# Patient Record
Sex: MALE | Race: WHITE | NOT HISPANIC OR LATINO | Employment: FULL TIME | URBAN - METROPOLITAN AREA
[De-identification: names, ages, dates, MRNs, and addresses within clinical notes are randomized per-mention and may not be internally consistent; named-entity substitution may affect disease eponyms.]

---

## 2017-01-24 ENCOUNTER — TELEPHONE (OUTPATIENT)
Dept: INTERNAL MEDICINE | Facility: CLINIC | Age: 47
End: 2017-01-24

## 2017-01-24 DIAGNOSIS — I10 ESSENTIAL HYPERTENSION, BENIGN: Primary | ICD-10-CM

## 2017-01-24 DIAGNOSIS — E78.00 PURE HYPERCHOLESTEROLEMIA: ICD-10-CM

## 2017-01-24 NOTE — TELEPHONE ENCOUNTER
----- Message from Betsy Camarena sent at 1/24/2017 12:43 PM EST -----  Contact: pt  Mr John is a pt of Dr Benjamin and is coming in on 1/26/17 for labs. Could you please have Dr Sargent put lab orders in for this pt. Since he is covering for quinn today. thanks

## 2017-01-26 ENCOUNTER — LAB (OUTPATIENT)
Dept: INTERNAL MEDICINE | Facility: CLINIC | Age: 47
End: 2017-01-26

## 2017-01-26 DIAGNOSIS — E78.00 PURE HYPERCHOLESTEROLEMIA: ICD-10-CM

## 2017-01-26 DIAGNOSIS — I10 ESSENTIAL HYPERTENSION: Primary | ICD-10-CM

## 2017-03-08 ENCOUNTER — HOSPITAL ENCOUNTER (OUTPATIENT)
Dept: PET IMAGING | Facility: HOSPITAL | Age: 47
Discharge: HOME | End: 2017-03-08
Attending: INTERNAL MEDICINE | Admitting: INTERNAL MEDICINE

## 2017-03-08 ENCOUNTER — LAB (OUTPATIENT)
Dept: LAB | Facility: HOSPITAL | Age: 47
End: 2017-03-08

## 2017-03-08 DIAGNOSIS — D3A.012 CARCINOID TUMOR OF ILEUM: ICD-10-CM

## 2017-03-08 LAB
ALBUMIN SERPL-MCNC: 4.7 G/DL (ref 3.5–5.2)
ALBUMIN/GLOB SERPL: 1.8 G/DL (ref 1.1–2.4)
ALP SERPL-CCNC: 65 U/L (ref 38–116)
ALT SERPL W P-5'-P-CCNC: 37 U/L (ref 0–41)
ANION GAP SERPL CALCULATED.3IONS-SCNC: 14.4 MMOL/L
AST SERPL-CCNC: 25 U/L (ref 0–40)
BASOPHILS # BLD AUTO: 0.03 10*3/MM3 (ref 0–0.1)
BASOPHILS NFR BLD AUTO: 0.6 % (ref 0–1.1)
BILIRUB SERPL-MCNC: 0.8 MG/DL (ref 0.1–1.2)
BUN BLD-MCNC: 20 MG/DL (ref 6–20)
BUN/CREAT SERPL: 24.7 (ref 7.3–30)
CALCIUM SPEC-SCNC: 9.5 MG/DL (ref 8.5–10.2)
CHLORIDE SERPL-SCNC: 99 MMOL/L (ref 98–107)
CO2 SERPL-SCNC: 25.6 MMOL/L (ref 22–29)
CREAT BLD-MCNC: 0.81 MG/DL (ref 0.7–1.3)
CREAT BLDA-MCNC: 0.8 MG/DL (ref 0.6–1.3)
DEPRECATED RDW RBC AUTO: 42.6 FL (ref 37–49)
EOSINOPHIL # BLD AUTO: 0.07 10*3/MM3 (ref 0–0.36)
EOSINOPHIL NFR BLD AUTO: 1.3 % (ref 1–5)
ERYTHROCYTE [DISTWIDTH] IN BLOOD BY AUTOMATED COUNT: 13.1 % (ref 11.7–14.5)
GFR SERPL CREATININE-BSD FRML MDRD: 102 ML/MIN/1.73
GLOBULIN UR ELPH-MCNC: 2.6 GM/DL (ref 1.8–3.5)
GLUCOSE BLD-MCNC: 80 MG/DL (ref 74–124)
HCT VFR BLD AUTO: 46.1 % (ref 40–49)
HGB BLD-MCNC: 15.5 G/DL (ref 13.5–16.5)
IMM GRANULOCYTES # BLD: 0 10*3/MM3 (ref 0–0.03)
IMM GRANULOCYTES NFR BLD: 0 % (ref 0–0.5)
LYMPHOCYTES # BLD AUTO: 2.1 10*3/MM3 (ref 1–3.5)
LYMPHOCYTES NFR BLD AUTO: 39.3 % (ref 20–49)
MCH RBC QN AUTO: 30.1 PG (ref 27–33)
MCHC RBC AUTO-ENTMCNC: 33.6 G/DL (ref 32–35)
MCV RBC AUTO: 89.5 FL (ref 83–97)
MONOCYTES # BLD AUTO: 0.4 10*3/MM3 (ref 0.25–0.8)
MONOCYTES NFR BLD AUTO: 7.5 % (ref 4–12)
NEUTROPHILS # BLD AUTO: 2.74 10*3/MM3 (ref 1.5–7)
NEUTROPHILS NFR BLD AUTO: 51.3 % (ref 39–75)
NRBC BLD MANUAL-RTO: 0 /100 WBC (ref 0–0)
PLATELET # BLD AUTO: 232 10*3/MM3 (ref 150–375)
PMV BLD AUTO: 9.8 FL (ref 8.9–12.1)
POTASSIUM BLD-SCNC: 4 MMOL/L (ref 3.5–4.7)
PROT SERPL-MCNC: 7.3 G/DL (ref 6.3–8)
RBC # BLD AUTO: 5.15 10*6/MM3 (ref 4.3–5.5)
SODIUM BLD-SCNC: 139 MMOL/L (ref 134–145)
VIT B12 BLD-MCNC: 363 PG/ML (ref 250–999)
WBC NRBC COR # BLD: 5.34 10*3/MM3 (ref 4–10)

## 2017-03-08 PROCEDURE — 85025 COMPLETE CBC W/AUTO DIFF WBC: CPT

## 2017-03-08 PROCEDURE — 0 IOPAMIDOL 61 % SOLUTION: Performed by: INTERNAL MEDICINE

## 2017-03-08 PROCEDURE — 82565 ASSAY OF CREATININE: CPT

## 2017-03-08 PROCEDURE — 74177 CT ABD & PELVIS W/CONTRAST: CPT

## 2017-03-08 PROCEDURE — 82607 VITAMIN B-12: CPT

## 2017-03-08 PROCEDURE — 71260 CT THORAX DX C+: CPT

## 2017-03-08 PROCEDURE — 25510000001 DIATRIZOATE MEGLUMINE & SODIUM PER 1 ML: Performed by: INTERNAL MEDICINE

## 2017-03-08 PROCEDURE — 80053 COMPREHEN METABOLIC PANEL: CPT

## 2017-03-08 PROCEDURE — 36415 COLL VENOUS BLD VENIPUNCTURE: CPT

## 2017-03-08 RX ADMIN — IOPAMIDOL 85 ML: 612 INJECTION, SOLUTION INTRAVENOUS at 08:45

## 2017-03-08 RX ADMIN — DIATRIZOATE MEGLUMINE AND DIATRIZOATE SODIUM 30 ML: 660; 100 LIQUID ORAL; RECTAL at 08:00

## 2017-03-10 LAB — CGA SERPL-SCNC: <1 NMOL/L (ref 0–5)

## 2017-03-13 LAB
5OH-INDOLEACETATE 24H UR-MCNC: NORMAL MG/L
5OH-INDOLEACETATE 24H UR-MRATE: NORMAL MG/(24.H)

## 2017-03-21 ENCOUNTER — APPOINTMENT (OUTPATIENT)
Dept: LAB | Facility: HOSPITAL | Age: 47
End: 2017-03-21

## 2017-03-21 ENCOUNTER — INFUSION (OUTPATIENT)
Dept: ONCOLOGY | Facility: HOSPITAL | Age: 47
End: 2017-03-21

## 2017-03-21 ENCOUNTER — OFFICE VISIT (OUTPATIENT)
Dept: ONCOLOGY | Facility: CLINIC | Age: 47
End: 2017-03-21

## 2017-03-21 VITALS
BODY MASS INDEX: 30.75 KG/M2 | DIASTOLIC BLOOD PRESSURE: 86 MMHG | SYSTOLIC BLOOD PRESSURE: 134 MMHG | RESPIRATION RATE: 12 BRPM | HEART RATE: 55 BPM | HEIGHT: 70 IN | TEMPERATURE: 98.1 F | OXYGEN SATURATION: 100 % | WEIGHT: 214.8 LBS

## 2017-03-21 DIAGNOSIS — K90.9 VITAMIN B12 DEFICIENCY DUE TO INTESTINAL MALABSORPTION: Primary | ICD-10-CM

## 2017-03-21 DIAGNOSIS — D3A.012 CARCINOID TUMOR OF ILEUM: ICD-10-CM

## 2017-03-21 DIAGNOSIS — E53.8 B12 DEFICIENCY: Primary | ICD-10-CM

## 2017-03-21 DIAGNOSIS — E53.8 VITAMIN B12 DEFICIENCY DUE TO INTESTINAL MALABSORPTION: Primary | ICD-10-CM

## 2017-03-21 PROCEDURE — 99213 OFFICE O/P EST LOW 20 MIN: CPT | Performed by: INTERNAL MEDICINE

## 2017-03-21 RX ORDER — CYANOCOBALAMIN 1000 UG/ML
1000 INJECTION, SOLUTION INTRAMUSCULAR; SUBCUTANEOUS ONCE
Status: COMPLETED | OUTPATIENT
Start: 2017-03-21 | End: 2017-03-21

## 2017-03-21 RX ORDER — CYANOCOBALAMIN 1000 UG/ML
1000 INJECTION, SOLUTION INTRAMUSCULAR; SUBCUTANEOUS ONCE
Status: CANCELLED | OUTPATIENT
Start: 2017-04-18

## 2017-03-21 RX ADMIN — CYANOCOBALAMIN 1000 MCG: 1000 INJECTION, SOLUTION INTRAMUSCULAR; SUBCUTANEOUS at 10:46

## 2017-03-21 NOTE — PROGRESS NOTES
Subjective     REASON FOR FOLLOW UP:  RESECTED CARCINOID TUMOR of ILEUM    HISTORY OF PRESENT ILLNESS:  The patient is a 47 y.o. year old male who was having some abdominal complaints which led to a colonoscopy with Dr. Vo on 8/16/2016.  He was found to have a polyp in the terminal ileum which was biopsied and showed carcinoid tumor.  He subsequently underwent a staging CT scan of the abdomen and pelvis on 8/26/2016 which showed no evidence of distant metastases but did show a 1.7 cm regional lymph node.    The patient underwent a laparoscopic right hemicolectomy with resection of the terminal ileum and appendectomy performed by Dr Bedolla on 8/29/2016.  The pathology from that specimen as noted below showed an 8 mm carcinoid tumor in the terminal ileum with 1 out of 16 lymph nodes positive for metastatic carcinoid tumor (T3 N1 M0).    She was seen for initial consultation in our office in September 2016.  It was determined that he did not require any adjuvant treatment but we set up a surveillance schedule every 6 months or so over the next 2 years to continue to monitor for recurrence.      He returns today for his first 6 month follow-up visit with CT scans and labs.  He sees beginning along well.  The CT scan from 3/8/2017 showed no evidence of recurrent or metastatic disease.  His serum chromogranin A level was less than 1.  His B12 level was in the low normal range at 363.    History of Present Illness     Past Medical History   Diagnosis Date   • Anxiety    • Cancer      Terminal ileum   • Colon polyp    • Dizziness    • Heart palpitations      Patient takes Atenolol   • History of kidney stones    • Hypercholesterolemia    • Nephrolithiasis         Past Surgical History   Procedure Laterality Date   • Colonoscopy  05/21/2009     , ruperto    • Colonoscopy N/A 8/16/2016     Procedure: COLONOSCOPYWITH BIOPSIES AND COLD POLYPECTOMY TIMES 1 ;  Surgeon: Will Vo MD;  Location: [facility] ENDOSCOPY;   Service:    • Tonsillectomy       CHILDHOOD   • Colon resection Right 8/29/2016     Procedure: COLON RESECTION RIGHT LAPAROSCOPIC;  Surgeon: Baron Bedolla MD;  Location: [facility] MAIN OR;  Service:         Current Outpatient Prescriptions on File Prior to Visit   Medication Sig Dispense Refill   • atenolol (TENORMIN) 25 MG tablet Take 1 tablet by mouth Every Evening. Takes 12.5 90 tablet 3   • ibuprofen (ADVIL,MOTRIN) 400 MG tablet Take 400 mg by mouth as needed for mild pain (1-3).     • VYTORIN 10-40 MG per tablet TAKE 1 TABLET DAILY 90 tablet 3     No current facility-administered medications on file prior to visit.         ALLERGIES:    Allergies   Allergen Reactions   • Penicillins      Patient unsure had as a child        Social History     Social History   • Marital status:      Spouse name: Jihan   • Number of children: N/A   • Years of education: College     Occupational History   •  [employer]     Social History Main Topics   • Smoking status: Never Smoker   • Smokeless tobacco: Never Used   • Alcohol use Yes      Comment: Socially   • Drug use: No   • Sexual activity: Defer     Other Topics Concern   • Not on file     Social History Narrative        Family History   Problem Relation Age of Onset   • Memory loss Mother    • Heart disease Father    • Other Father      CABG   • Colon polyps Father         Review of Systems   Constitutional: Negative for activity change, appetite change, fatigue, fever and unexpected weight change.   HENT: Negative for hearing loss, nosebleeds, trouble swallowing and voice change.    Eyes: Negative for visual disturbance.   Respiratory: Negative for cough, shortness of breath and wheezing.    Cardiovascular: Negative for chest pain and palpitations.   Gastrointestinal: Negative for abdominal pain, diarrhea, nausea and vomiting.   Genitourinary: Negative for difficulty urinating, frequency, hematuria and urgency.   Musculoskeletal: Negative for back pain and  "neck pain.   Skin: Negative for rash.   Neurological: Negative for dizziness, seizures, syncope and headaches.   Hematological: Negative for adenopathy. Does not bruise/bleed easily.   Psychiatric/Behavioral: Negative for behavioral problems. The patient is not nervous/anxious.         Objective     Vitals:    03/21/17 1017   BP: 134/86   Pulse: 55   Resp: 12   Temp: 98.1 °F (36.7 °C)   TempSrc: Oral   SpO2: 100%   Weight: 214 lb 12.8 oz (97.4 kg)   Height: 70.47\" (179 cm)  Comment: new height   PainSc: 0-No pain     Current Status 3/21/2017   ECOG score 0       Physical Exam   Constitutional: He is oriented to person, place, and time. He appears well-developed and well-nourished. No distress.   HENT:   Head: Normocephalic.   Eyes: Conjunctivae and EOM are normal. Pupils are equal, round, and reactive to light. No scleral icterus.   Neck: Normal range of motion. Neck supple. No JVD present. No thyromegaly present.   Cardiovascular: Normal rate and regular rhythm.  Exam reveals no gallop and no friction rub.    No murmur heard.  Pulmonary/Chest: Effort normal and breath sounds normal. He has no wheezes. He has no rales.   Abdominal: Soft. He exhibits no distension and no mass. There is no tenderness.   Healed surgical scar RLQ.   Musculoskeletal: Normal range of motion. He exhibits no edema or deformity.   Lymphadenopathy:     He has no cervical adenopathy.   Neurological: He is alert and oriented to person, place, and time. He has normal reflexes. No cranial nerve deficit.   Skin: Skin is warm and dry. No rash noted. No erythema.   Psychiatric: He has a normal mood and affect. His behavior is normal. Judgment normal.         RECENT LABS:  Hematology WBC   Date Value Ref Range Status   03/08/2017 5.34 4.00 - 10.00 10*3/mm3 Final   03/29/2014 6.39 4.50 - 10.70 K/Cumm Final     RBC   Date Value Ref Range Status   03/08/2017 5.15 4.30 - 5.50 10*6/mm3 Final   03/29/2014 4.96 4.60 - 6.00 Million Final     HEMOGLOBIN "   Date Value Ref Range Status   03/08/2017 15.5 13.5 - 16.5 g/dL Final   03/29/2014 14.9 13.7 - 17.6 g/dL Final     HEMATOCRIT   Date Value Ref Range Status   03/08/2017 46.1 40.0 - 49.0 % Final   03/29/2014 43.2 40.4 - 52.2 % Final     PLATELETS   Date Value Ref Range Status   03/08/2017 232 150 - 375 10*3/mm3 Final   03/29/2014 221 140 - 500 K/Cumm Final        CT ABDOMEN AND PELVIS WITH IV CONTRAST  3/8/2017   IMPRESSION:  There is no lymphadenopathy or evidence for metastatic  disease. No new abnormality is seen.    Chromogranin A 0 - 5 nmol/L <1     Vitamin B-12 250 - 999 pg/mL 363     Lab Results   Component Value Date    GLUCOSE 80 03/08/2017    BUN 20 03/08/2017    CREATININE 0.80 03/08/2017    EGFRIFNONA 102 03/08/2017    EGFRIFAFRI  09/16/2016      Comment:      <15 Indicative of kidney failure.    BCR 24.7 03/08/2017    K 4.0 03/08/2017    CO2 25.6 03/08/2017    CALCIUM 9.5 03/08/2017    ALBUMIN 4.70 03/08/2017    LABIL2 1.8 03/08/2017    AST 25 03/08/2017    ALT 37 03/08/2017           Assessment/Plan     1.  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  Patient has no evidence of distant metastatic disease on recent scans and lab studies as noted above.    2.  Resection of the terminal ileum putting him at risk for future B12 deficiency.    Plan  1.  I discussed the results of the labs and CT scans from one week ago with the patient.  At this point we would plan to continue surveillance with additional labs and scans in 6 months.  2.  We also will begin parenteral B12 maintenance therapy with B12 1000 µg IM injected monthly over the next 6 months.  3.  He'll return for M.D. follow-up in 6 months and again will have labs and a CT scan performed 1 week prior to visit.

## 2017-04-14 ENCOUNTER — RESULTS ENCOUNTER (OUTPATIENT)
Dept: ONCOLOGY | Facility: CLINIC | Age: 47
End: 2017-04-14

## 2017-04-14 DIAGNOSIS — E53.8 VITAMIN B12 DEFICIENCY DUE TO INTESTINAL MALABSORPTION: ICD-10-CM

## 2017-04-14 DIAGNOSIS — D3A.012 CARCINOID TUMOR OF ILEUM: ICD-10-CM

## 2017-04-14 DIAGNOSIS — K90.9 VITAMIN B12 DEFICIENCY DUE TO INTESTINAL MALABSORPTION: ICD-10-CM

## 2017-04-18 ENCOUNTER — INFUSION (OUTPATIENT)
Dept: ONCOLOGY | Facility: HOSPITAL | Age: 47
End: 2017-04-18

## 2017-04-18 DIAGNOSIS — E53.8 B12 DEFICIENCY: Primary | ICD-10-CM

## 2017-04-18 PROCEDURE — 96372 THER/PROPH/DIAG INJ SC/IM: CPT | Performed by: INTERNAL MEDICINE

## 2017-04-18 PROCEDURE — 25010000002 CYANOCOBALAMIN PER 1000 MCG: Performed by: INTERNAL MEDICINE

## 2017-04-18 RX ORDER — CYANOCOBALAMIN 1000 UG/ML
1000 INJECTION, SOLUTION INTRAMUSCULAR; SUBCUTANEOUS ONCE
Status: CANCELLED | OUTPATIENT
Start: 2017-05-16

## 2017-04-18 RX ORDER — CYANOCOBALAMIN 1000 UG/ML
1000 INJECTION, SOLUTION INTRAMUSCULAR; SUBCUTANEOUS ONCE
Status: COMPLETED | OUTPATIENT
Start: 2017-04-18 | End: 2017-04-18

## 2017-04-18 RX ADMIN — CYANOCOBALAMIN 1000 MCG: 1000 INJECTION, SOLUTION INTRAMUSCULAR; SUBCUTANEOUS at 16:17

## 2017-05-12 ENCOUNTER — RESULTS ENCOUNTER (OUTPATIENT)
Dept: ONCOLOGY | Facility: CLINIC | Age: 47
End: 2017-05-12

## 2017-05-12 DIAGNOSIS — K90.9 VITAMIN B12 DEFICIENCY DUE TO INTESTINAL MALABSORPTION: ICD-10-CM

## 2017-05-12 DIAGNOSIS — E53.8 VITAMIN B12 DEFICIENCY DUE TO INTESTINAL MALABSORPTION: ICD-10-CM

## 2017-05-12 DIAGNOSIS — D3A.012 CARCINOID TUMOR OF ILEUM: ICD-10-CM

## 2017-05-16 ENCOUNTER — INFUSION (OUTPATIENT)
Dept: ONCOLOGY | Facility: HOSPITAL | Age: 47
End: 2017-05-16

## 2017-05-16 DIAGNOSIS — E53.8 B12 DEFICIENCY: Primary | ICD-10-CM

## 2017-05-16 PROCEDURE — 96372 THER/PROPH/DIAG INJ SC/IM: CPT | Performed by: INTERNAL MEDICINE

## 2017-05-16 PROCEDURE — 25010000002 CYANOCOBALAMIN PER 1000 MCG: Performed by: INTERNAL MEDICINE

## 2017-05-16 RX ORDER — CYANOCOBALAMIN 1000 UG/ML
1000 INJECTION, SOLUTION INTRAMUSCULAR; SUBCUTANEOUS ONCE
Status: COMPLETED | OUTPATIENT
Start: 2017-05-16 | End: 2017-05-16

## 2017-05-16 RX ORDER — CYANOCOBALAMIN 1000 UG/ML
1000 INJECTION, SOLUTION INTRAMUSCULAR; SUBCUTANEOUS ONCE
Status: CANCELLED | OUTPATIENT
Start: 2017-06-13

## 2017-05-16 RX ADMIN — CYANOCOBALAMIN 1000 MCG: 1000 INJECTION, SOLUTION INTRAMUSCULAR; SUBCUTANEOUS at 16:13

## 2017-05-23 ENCOUNTER — RESULTS ENCOUNTER (OUTPATIENT)
Dept: INTERNAL MEDICINE | Facility: CLINIC | Age: 47
End: 2017-05-23

## 2017-05-23 ENCOUNTER — OFFICE VISIT (OUTPATIENT)
Dept: INTERNAL MEDICINE | Facility: CLINIC | Age: 47
End: 2017-05-23

## 2017-05-23 VITALS
WEIGHT: 212 LBS | BODY MASS INDEX: 30.35 KG/M2 | HEIGHT: 70 IN | DIASTOLIC BLOOD PRESSURE: 98 MMHG | SYSTOLIC BLOOD PRESSURE: 140 MMHG

## 2017-05-23 DIAGNOSIS — R19.7 DIARRHEA, UNSPECIFIED TYPE: ICD-10-CM

## 2017-05-23 DIAGNOSIS — R10.9 ABDOMINAL PAIN, UNSPECIFIED LOCATION: Primary | ICD-10-CM

## 2017-05-23 PROCEDURE — 99213 OFFICE O/P EST LOW 20 MIN: CPT | Performed by: INTERNAL MEDICINE

## 2017-05-24 ENCOUNTER — LAB (OUTPATIENT)
Dept: INTERNAL MEDICINE | Facility: CLINIC | Age: 47
End: 2017-05-24

## 2017-05-24 DIAGNOSIS — R19.7 DIARRHEA, UNSPECIFIED TYPE: Primary | ICD-10-CM

## 2017-05-24 LAB — HEMOCCULT STL QL IA: POSITIVE

## 2017-05-24 PROCEDURE — 82274 ASSAY TEST FOR BLOOD FECAL: CPT | Performed by: INTERNAL MEDICINE

## 2017-05-31 ENCOUNTER — APPOINTMENT (OUTPATIENT)
Dept: CT IMAGING | Facility: HOSPITAL | Age: 47
End: 2017-05-31
Attending: INTERNAL MEDICINE

## 2017-05-31 DIAGNOSIS — R19.7 DIARRHEA, UNSPECIFIED TYPE: Primary | ICD-10-CM

## 2017-06-06 ENCOUNTER — APPOINTMENT (OUTPATIENT)
Dept: ONCOLOGY | Facility: HOSPITAL | Age: 47
End: 2017-06-06

## 2017-06-08 ENCOUNTER — OFFICE VISIT (OUTPATIENT)
Dept: INTERNAL MEDICINE | Facility: CLINIC | Age: 47
End: 2017-06-08

## 2017-06-08 ENCOUNTER — INFUSION (OUTPATIENT)
Dept: ONCOLOGY | Facility: HOSPITAL | Age: 47
End: 2017-06-08

## 2017-06-08 VITALS
WEIGHT: 205 LBS | HEART RATE: 80 BPM | OXYGEN SATURATION: 100 % | HEIGHT: 70 IN | DIASTOLIC BLOOD PRESSURE: 86 MMHG | BODY MASS INDEX: 29.35 KG/M2 | SYSTOLIC BLOOD PRESSURE: 134 MMHG

## 2017-06-08 VITALS — WEIGHT: 208 LBS | BODY MASS INDEX: 29.84 KG/M2

## 2017-06-08 DIAGNOSIS — K58.0 IRRITABLE BOWEL SYNDROME WITH DIARRHEA: Primary | ICD-10-CM

## 2017-06-08 DIAGNOSIS — E53.8 B12 DEFICIENCY: Primary | ICD-10-CM

## 2017-06-08 DIAGNOSIS — E53.8 VITAMIN B12 DEFICIENCY DUE TO INTESTINAL MALABSORPTION: ICD-10-CM

## 2017-06-08 DIAGNOSIS — D3A.012 CARCINOID TUMOR OF ILEUM: ICD-10-CM

## 2017-06-08 DIAGNOSIS — K90.9 VITAMIN B12 DEFICIENCY DUE TO INTESTINAL MALABSORPTION: ICD-10-CM

## 2017-06-08 PROCEDURE — 99213 OFFICE O/P EST LOW 20 MIN: CPT | Performed by: INTERNAL MEDICINE

## 2017-06-08 PROCEDURE — 25010000002 CYANOCOBALAMIN PER 1000 MCG: Performed by: INTERNAL MEDICINE

## 2017-06-08 PROCEDURE — 96372 THER/PROPH/DIAG INJ SC/IM: CPT | Performed by: INTERNAL MEDICINE

## 2017-06-08 RX ORDER — CYANOCOBALAMIN 1000 UG/ML
1000 INJECTION, SOLUTION INTRAMUSCULAR; SUBCUTANEOUS ONCE
Status: CANCELLED | OUTPATIENT
Start: 2017-06-13

## 2017-06-08 RX ORDER — CYANOCOBALAMIN 1000 UG/ML
1000 INJECTION, SOLUTION INTRAMUSCULAR; SUBCUTANEOUS ONCE
Status: COMPLETED | OUTPATIENT
Start: 2017-06-08 | End: 2017-06-08

## 2017-06-08 RX ADMIN — CYANOCOBALAMIN 1000 MCG: 1000 INJECTION, SOLUTION INTRAMUSCULAR; SUBCUTANEOUS at 08:51

## 2017-06-08 NOTE — PROGRESS NOTES
Subjective   Bill Cano is a 47 y.o. male.     Abdominal Pain   This is a recurrent problem. Associated symptoms include diarrhea (Can have up to 10 BMs /day Stools are loose).   Diarrhea    Associated symptoms include abdominal pain ( Still having intermittent crampimg & llose stools).        The following portions of the patient's history were reviewed and updated as appropriate: allergies, current medications, past family history, past medical history, past social history, past surgical history and problem list.    Review of Systems   Constitutional:        Here for F/U diarrhea   HENT: Negative.    Respiratory: Negative.    Gastrointestinal: Positive for abdominal pain ( Still having intermittent crampimg & llose stools) and diarrhea (Can have up to 10 BMs /day Stools are loose).        Not able to see Dr Tavo sumner */10/17   Genitourinary: Negative.    Musculoskeletal: Negative.        Objective   Physical Exam   Constitutional: He appears well-developed.   HENT:   Head: Normocephalic.   Eyes: EOM are normal.   Neck: Neck supple.   Cardiovascular: Normal rate and regular rhythm.    Pulmonary/Chest: Effort normal and breath sounds normal.   Abdominal: Soft. Bowel sounds are normal. He exhibits no mass. There is no tenderness.   Musculoskeletal: Normal range of motion.   Neurological: He is alert.   Vitals reviewed.      Assessment/Plan   Bill was seen today for abdominal pain and diarrhea.    Diagnoses and all orders for this visit:    Irritable bowel syndrome with diarrhea  -     Eluxadoline 100 MG tablet; Take 1 tablet by mouth 2 (Two) Times a Day.    Carcinoid tumor of ileum    Vitamin B12 deficiency due to intestinal malabsorption    Advised Imodium also PRN

## 2017-06-09 ENCOUNTER — RESULTS ENCOUNTER (OUTPATIENT)
Dept: ONCOLOGY | Facility: CLINIC | Age: 47
End: 2017-06-09

## 2017-06-09 DIAGNOSIS — D3A.012 CARCINOID TUMOR OF ILEUM: ICD-10-CM

## 2017-06-09 DIAGNOSIS — E53.8 VITAMIN B12 DEFICIENCY DUE TO INTESTINAL MALABSORPTION: ICD-10-CM

## 2017-06-09 DIAGNOSIS — K90.9 VITAMIN B12 DEFICIENCY DUE TO INTESTINAL MALABSORPTION: ICD-10-CM

## 2017-07-05 ENCOUNTER — INFUSION (OUTPATIENT)
Dept: ONCOLOGY | Facility: HOSPITAL | Age: 47
End: 2017-07-05

## 2017-07-05 DIAGNOSIS — E53.8 B12 DEFICIENCY: Primary | ICD-10-CM

## 2017-07-05 PROCEDURE — 25010000002 CYANOCOBALAMIN PER 1000 MCG: Performed by: INTERNAL MEDICINE

## 2017-07-05 PROCEDURE — 96372 THER/PROPH/DIAG INJ SC/IM: CPT | Performed by: INTERNAL MEDICINE

## 2017-07-05 RX ORDER — CYANOCOBALAMIN 1000 UG/ML
1000 INJECTION, SOLUTION INTRAMUSCULAR; SUBCUTANEOUS ONCE
Status: CANCELLED | OUTPATIENT
Start: 2017-07-11

## 2017-07-05 RX ORDER — CYANOCOBALAMIN 1000 UG/ML
1000 INJECTION, SOLUTION INTRAMUSCULAR; SUBCUTANEOUS ONCE
Status: COMPLETED | OUTPATIENT
Start: 2017-07-05 | End: 2017-07-05

## 2017-07-05 RX ADMIN — CYANOCOBALAMIN 1000 MCG: 1000 INJECTION, SOLUTION INTRAMUSCULAR; SUBCUTANEOUS at 16:20

## 2017-07-07 ENCOUNTER — RESULTS ENCOUNTER (OUTPATIENT)
Dept: ONCOLOGY | Facility: CLINIC | Age: 47
End: 2017-07-07

## 2017-07-07 DIAGNOSIS — E53.8 VITAMIN B12 DEFICIENCY DUE TO INTESTINAL MALABSORPTION: ICD-10-CM

## 2017-07-07 DIAGNOSIS — D3A.012 CARCINOID TUMOR OF ILEUM: ICD-10-CM

## 2017-07-07 DIAGNOSIS — K90.9 VITAMIN B12 DEFICIENCY DUE TO INTESTINAL MALABSORPTION: ICD-10-CM

## 2017-08-02 ENCOUNTER — INFUSION (OUTPATIENT)
Dept: ONCOLOGY | Facility: HOSPITAL | Age: 47
End: 2017-08-02

## 2017-08-02 DIAGNOSIS — E53.8 B12 DEFICIENCY: Primary | ICD-10-CM

## 2017-08-02 PROCEDURE — 96372 THER/PROPH/DIAG INJ SC/IM: CPT | Performed by: INTERNAL MEDICINE

## 2017-08-02 PROCEDURE — 25010000002 CYANOCOBALAMIN PER 1000 MCG: Performed by: INTERNAL MEDICINE

## 2017-08-02 RX ORDER — CYANOCOBALAMIN 1000 UG/ML
1000 INJECTION, SOLUTION INTRAMUSCULAR; SUBCUTANEOUS ONCE
Status: COMPLETED | OUTPATIENT
Start: 2017-08-02 | End: 2017-08-02

## 2017-08-02 RX ORDER — CYANOCOBALAMIN 1000 UG/ML
1000 INJECTION, SOLUTION INTRAMUSCULAR; SUBCUTANEOUS ONCE
Status: CANCELLED | OUTPATIENT
Start: 2017-08-08

## 2017-08-02 RX ADMIN — CYANOCOBALAMIN 1000 MCG: 1000 INJECTION, SOLUTION INTRAMUSCULAR; SUBCUTANEOUS at 16:19

## 2017-08-04 ENCOUNTER — RESULTS ENCOUNTER (OUTPATIENT)
Dept: ONCOLOGY | Facility: CLINIC | Age: 47
End: 2017-08-04

## 2017-08-04 DIAGNOSIS — E53.8 VITAMIN B12 DEFICIENCY DUE TO INTESTINAL MALABSORPTION: ICD-10-CM

## 2017-08-04 DIAGNOSIS — K90.9 VITAMIN B12 DEFICIENCY DUE TO INTESTINAL MALABSORPTION: ICD-10-CM

## 2017-08-04 DIAGNOSIS — D3A.012 CARCINOID TUMOR OF ILEUM: ICD-10-CM

## 2017-08-10 ENCOUNTER — OFFICE VISIT (OUTPATIENT)
Dept: GASTROENTEROLOGY | Facility: CLINIC | Age: 47
End: 2017-08-10

## 2017-08-10 VITALS
HEIGHT: 70 IN | WEIGHT: 210 LBS | BODY MASS INDEX: 30.06 KG/M2 | DIASTOLIC BLOOD PRESSURE: 88 MMHG | SYSTOLIC BLOOD PRESSURE: 138 MMHG

## 2017-08-10 DIAGNOSIS — R19.5 HEME + STOOL: ICD-10-CM

## 2017-08-10 DIAGNOSIS — K57.33 DIVERTICULITIS OF LARGE INTESTINE WITHOUT PERFORATION OR ABSCESS WITH BLEEDING: ICD-10-CM

## 2017-08-10 DIAGNOSIS — Z83.71 FAMILY HISTORY OF POLYPS IN THE COLON: Primary | ICD-10-CM

## 2017-08-10 DIAGNOSIS — D3A.00 CARCINOID TUMOR: ICD-10-CM

## 2017-08-10 PROBLEM — Z83.719 FAMILY HISTORY OF POLYPS IN THE COLON: Status: ACTIVE | Noted: 2017-08-10

## 2017-08-10 PROCEDURE — 99213 OFFICE O/P EST LOW 20 MIN: CPT | Performed by: INTERNAL MEDICINE

## 2017-08-10 RX ORDER — CYANOCOBALAMIN (VITAMIN B-12) 1000 MCG
1 TABLET, SUBLINGUAL SUBLINGUAL DAILY
Status: ON HOLD | COMMUNITY
End: 2017-08-31

## 2017-08-10 RX ORDER — SODIUM CHLORIDE 0.9 % (FLUSH) 0.9 %
1-10 SYRINGE (ML) INJECTION AS NEEDED
Status: CANCELLED | OUTPATIENT
Start: 2017-08-31

## 2017-08-10 NOTE — PROGRESS NOTES
Chief Complaint   Patient presents with   • Follow-up     had positive blood occult test       Bill Cano is a  47 y.o. male here for a follow up visit for Carcinoid tumor of the distal ileum (T3, N1, M0) resected with right hemicolectomy and appendectomy on 8/29/2016.  Patient has no evidence of distant metastatic disease on recent scans and lab studies.     HPI Comments: He has chronic diarrhea after the surgery.  He was placed on viberzi with excellent relief.  He has had no weight loss.  He was found to have heme positive stools.  He is here to schedule his surveillance colonoscopy      Past Medical History:   Diagnosis Date   • Anxiety    • Cancer     Terminal ileum   • Colon polyp    • Dizziness    • Heart palpitations     Patient takes Atenolol   • History of kidney stones    • Hypercholesterolemia    • Nephrolithiasis        Past Surgical History:   Procedure Laterality Date   • COLON RESECTION Right 8/29/2016    Procedure: COLON RESECTION RIGHT LAPAROSCOPIC;  Surgeon: Baron Bedolla MD;  Location: [facility] OR;  Service:    • COLONOSCOPY  05/21/2009    , tics    • COLONOSCOPY N/A 8/16/2016    Procedure: COLONOSCOPYWITH BIOPSIES AND COLD POLYPECTOMY TIMES 1 ;  Surgeon: Will Vo MD;  Location: [facility] ENDOSCOPY;  Service:    • TONSILLECTOMY      CHILDHOOD       Scheduled Meds:    Continuous Infusions:  No current facility-administered medications for this visit.     PRN Meds:.    Allergies   Allergen Reactions   • Penicillins      Patient unsure had as a child       Social History     Social History   • Marital status:      Spouse name: Jihan   • Number of children: N/A   • Years of education: College     Occupational History   •  [employer]     Social History Main Topics   • Smoking status: Never Smoker   • Smokeless tobacco: Never Used   • Alcohol use Yes      Comment: Socially   • Drug use: No   • Sexual activity: Defer     Other Topics Concern   • Not on  file     Social History Narrative       Family History   Problem Relation Age of Onset   • Memory loss Mother    • Heart disease Father    • Other Father      CABG   • Colon polyps Father        Review of Systems   Gastrointestinal: Positive for abdominal pain, blood in stool and diarrhea. Negative for constipation, nausea and rectal pain.   All other systems reviewed and are negative.      Vitals:    08/10/17 1344   BP: 138/88       Physical Exam   Constitutional: He is oriented to person, place, and time. He appears well-developed and well-nourished.   HENT:   Head: Normocephalic and atraumatic.   Eyes: Conjunctivae and EOM are normal.   Neck: Normal range of motion. No tracheal deviation present.   Cardiovascular: Normal rate and regular rhythm.    Pulmonary/Chest: Effort normal and breath sounds normal. No respiratory distress.   Abdominal: Soft. Bowel sounds are normal. He exhibits no distension and no mass. There is no tenderness. There is no rebound and no guarding.   Musculoskeletal: Normal range of motion.   Neurological: He is alert and oriented to person, place, and time.   Skin: Skin is warm and dry.   Psychiatric: He has a normal mood and affect. Judgment normal.   Nursing note and vitals reviewed.      No images are attached to the encounter.    Problem list    Ileal carcinoid, status post right hemicolectomy with terminal ileectomy.  No positive.  Did not require chemotherapy.  No distant metastasis.  Chronic diarrhea since surgery, appropriate workup for neuroendocrine tumor negative  Heme Positive stools      Assessment/Plan    Due to the above issues we will schedule surveillance colonoscopy this year, as  And another will be in 3 years.  He will continue viberzi  for IBS-D

## 2017-08-30 ENCOUNTER — INFUSION (OUTPATIENT)
Dept: ONCOLOGY | Facility: HOSPITAL | Age: 47
End: 2017-08-30

## 2017-08-30 DIAGNOSIS — E53.8 B12 DEFICIENCY: Primary | ICD-10-CM

## 2017-08-30 PROCEDURE — 25010000002 CYANOCOBALAMIN PER 1000 MCG: Performed by: INTERNAL MEDICINE

## 2017-08-30 PROCEDURE — 96372 THER/PROPH/DIAG INJ SC/IM: CPT | Performed by: INTERNAL MEDICINE

## 2017-08-30 RX ORDER — CYANOCOBALAMIN 1000 UG/ML
1000 INJECTION, SOLUTION INTRAMUSCULAR; SUBCUTANEOUS ONCE
Status: CANCELLED | OUTPATIENT
Start: 2017-09-05

## 2017-08-30 RX ORDER — CYANOCOBALAMIN 1000 UG/ML
1000 INJECTION, SOLUTION INTRAMUSCULAR; SUBCUTANEOUS ONCE
Status: COMPLETED | OUTPATIENT
Start: 2017-08-30 | End: 2017-08-30

## 2017-08-30 RX ADMIN — CYANOCOBALAMIN 1000 MCG: 1000 INJECTION, SOLUTION INTRAMUSCULAR; SUBCUTANEOUS at 16:23

## 2017-08-31 ENCOUNTER — ANESTHESIA (OUTPATIENT)
Dept: GASTROENTEROLOGY | Facility: HOSPITAL | Age: 47
End: 2017-08-31

## 2017-08-31 ENCOUNTER — ANESTHESIA EVENT (OUTPATIENT)
Dept: GASTROENTEROLOGY | Facility: HOSPITAL | Age: 47
End: 2017-08-31

## 2017-08-31 ENCOUNTER — HOSPITAL ENCOUNTER (OUTPATIENT)
Facility: HOSPITAL | Age: 47
Setting detail: HOSPITAL OUTPATIENT SURGERY
Discharge: HOME | End: 2017-08-31
Attending: INTERNAL MEDICINE | Admitting: INTERNAL MEDICINE

## 2017-08-31 VITALS
OXYGEN SATURATION: 97 % | DIASTOLIC BLOOD PRESSURE: 83 MMHG | RESPIRATION RATE: 16 BRPM | WEIGHT: 205.56 LBS | BODY MASS INDEX: 29.5 KG/M2 | SYSTOLIC BLOOD PRESSURE: 112 MMHG | HEART RATE: 67 BPM | TEMPERATURE: 97.9 F

## 2017-08-31 PROCEDURE — 45378 DIAGNOSTIC COLONOSCOPY: CPT | Performed by: INTERNAL MEDICINE

## 2017-08-31 PROCEDURE — 25010000002 PROPOFOL 10 MG/ML EMULSION: Performed by: ANESTHESIOLOGY

## 2017-08-31 RX ORDER — SODIUM CHLORIDE, SODIUM LACTATE, POTASSIUM CHLORIDE, CALCIUM CHLORIDE 600; 310; 30; 20 MG/100ML; MG/100ML; MG/100ML; MG/100ML
1000 INJECTION, SOLUTION INTRAVENOUS CONTINUOUS PRN
Status: DISCONTINUED | OUTPATIENT
Start: 2017-08-31 | End: 2017-08-31 | Stop reason: HOSPADM

## 2017-08-31 RX ORDER — LIDOCAINE HYDROCHLORIDE 20 MG/ML
INJECTION, SOLUTION INFILTRATION; PERINEURAL AS NEEDED
Status: DISCONTINUED | OUTPATIENT
Start: 2017-08-31 | End: 2017-08-31 | Stop reason: SURG

## 2017-08-31 RX ORDER — PROPOFOL 10 MG/ML
VIAL (ML) INTRAVENOUS AS NEEDED
Status: DISCONTINUED | OUTPATIENT
Start: 2017-08-31 | End: 2017-08-31 | Stop reason: SURG

## 2017-08-31 RX ADMIN — SODIUM CHLORIDE, POTASSIUM CHLORIDE, SODIUM LACTATE AND CALCIUM CHLORIDE 1000 ML: 600; 310; 30; 20 INJECTION, SOLUTION INTRAVENOUS at 09:03

## 2017-08-31 RX ADMIN — PROPOFOL 200 MG: 10 INJECTION, EMULSION INTRAVENOUS at 10:05

## 2017-08-31 RX ADMIN — LIDOCAINE HYDROCHLORIDE 100 MG: 20 INJECTION, SOLUTION INFILTRATION; PERINEURAL at 09:58

## 2017-08-31 RX ADMIN — PROPOFOL 200 MG: 10 INJECTION, EMULSION INTRAVENOUS at 09:58

## 2017-09-06 ENCOUNTER — HOSPITAL ENCOUNTER (OUTPATIENT)
Dept: PET IMAGING | Facility: HOSPITAL | Age: 47
Discharge: HOME | End: 2017-09-06
Attending: INTERNAL MEDICINE | Admitting: INTERNAL MEDICINE

## 2017-09-06 ENCOUNTER — LAB (OUTPATIENT)
Dept: LAB | Facility: HOSPITAL | Age: 47
End: 2017-09-06

## 2017-09-06 DIAGNOSIS — K90.9 VITAMIN B12 DEFICIENCY DUE TO INTESTINAL MALABSORPTION: ICD-10-CM

## 2017-09-06 DIAGNOSIS — E53.8 VITAMIN B12 DEFICIENCY DUE TO INTESTINAL MALABSORPTION: ICD-10-CM

## 2017-09-06 DIAGNOSIS — D3A.012 CARCINOID TUMOR OF ILEUM: ICD-10-CM

## 2017-09-06 LAB
ALBUMIN SERPL-MCNC: 4.8 G/DL (ref 3.5–5.2)
ALBUMIN/GLOB SERPL: 1.7 G/DL (ref 1.1–2.4)
ALP SERPL-CCNC: 65 U/L (ref 38–116)
ALT SERPL W P-5'-P-CCNC: 47 U/L (ref 0–41)
ANION GAP SERPL CALCULATED.3IONS-SCNC: 14.8 MMOL/L
AST SERPL-CCNC: 25 U/L (ref 0–40)
BASOPHILS # BLD AUTO: 0.04 10*3/MM3 (ref 0–0.1)
BASOPHILS NFR BLD AUTO: 0.8 % (ref 0–1.1)
BILIRUB SERPL-MCNC: 1 MG/DL (ref 0.1–1.2)
BUN BLD-MCNC: 15 MG/DL (ref 6–20)
BUN/CREAT SERPL: 18.8 (ref 7.3–30)
CALCIUM SPEC-SCNC: 9.7 MG/DL (ref 8.5–10.2)
CHLORIDE SERPL-SCNC: 99 MMOL/L (ref 98–107)
CO2 SERPL-SCNC: 26.2 MMOL/L (ref 22–29)
CREAT BLD-MCNC: 0.8 MG/DL (ref 0.7–1.3)
CREAT BLDA-MCNC: 0.8 MG/DL (ref 0.6–1.3)
DEPRECATED RDW RBC AUTO: 42.4 FL (ref 37–49)
EOSINOPHIL # BLD AUTO: 0.05 10*3/MM3 (ref 0–0.36)
EOSINOPHIL NFR BLD AUTO: 1 % (ref 1–5)
ERYTHROCYTE [DISTWIDTH] IN BLOOD BY AUTOMATED COUNT: 12.5 % (ref 11.7–14.5)
GFR SERPL CREATININE-BSD FRML MDRD: 103 ML/MIN/1.73
GLOBULIN UR ELPH-MCNC: 2.9 GM/DL (ref 1.8–3.5)
GLUCOSE BLD-MCNC: 70 MG/DL (ref 74–124)
HCT VFR BLD AUTO: 48.5 % (ref 40–49)
HGB BLD-MCNC: 16.2 G/DL (ref 13.5–16.5)
IMM GRANULOCYTES # BLD: 0.01 10*3/MM3 (ref 0–0.03)
IMM GRANULOCYTES NFR BLD: 0.2 % (ref 0–0.5)
LYMPHOCYTES # BLD AUTO: 1.93 10*3/MM3 (ref 1–3.5)
LYMPHOCYTES NFR BLD AUTO: 37.7 % (ref 20–49)
MCH RBC QN AUTO: 30.6 PG (ref 27–33)
MCHC RBC AUTO-ENTMCNC: 33.4 G/DL (ref 32–35)
MCV RBC AUTO: 91.7 FL (ref 83–97)
MONOCYTES # BLD AUTO: 0.41 10*3/MM3 (ref 0.25–0.8)
MONOCYTES NFR BLD AUTO: 8 % (ref 4–12)
NEUTROPHILS # BLD AUTO: 2.68 10*3/MM3 (ref 1.5–7)
NEUTROPHILS NFR BLD AUTO: 52.3 % (ref 39–75)
NRBC BLD MANUAL-RTO: 0 /100 WBC (ref 0–0)
PLATELET # BLD AUTO: 217 10*3/MM3 (ref 150–375)
PMV BLD AUTO: 9.5 FL (ref 8.9–12.1)
POTASSIUM BLD-SCNC: 4.2 MMOL/L (ref 3.5–4.7)
PROT SERPL-MCNC: 7.7 G/DL (ref 6.3–8)
RBC # BLD AUTO: 5.29 10*6/MM3 (ref 4.3–5.5)
SODIUM BLD-SCNC: 140 MMOL/L (ref 134–145)
VIT B12 BLD-MCNC: 560 PG/ML (ref 211–946)
WBC NRBC COR # BLD: 5.12 10*3/MM3 (ref 4–10)

## 2017-09-06 PROCEDURE — 74177 CT ABD & PELVIS W/CONTRAST: CPT

## 2017-09-06 PROCEDURE — 0 DIATRIZOATE MEGLUMINE & SODIUM PER 1 ML: Performed by: INTERNAL MEDICINE

## 2017-09-06 PROCEDURE — 85025 COMPLETE CBC W/AUTO DIFF WBC: CPT | Performed by: INTERNAL MEDICINE

## 2017-09-06 PROCEDURE — 36415 COLL VENOUS BLD VENIPUNCTURE: CPT | Performed by: INTERNAL MEDICINE

## 2017-09-06 PROCEDURE — 82565 ASSAY OF CREATININE: CPT

## 2017-09-06 PROCEDURE — 80053 COMPREHEN METABOLIC PANEL: CPT | Performed by: INTERNAL MEDICINE

## 2017-09-06 PROCEDURE — 82607 VITAMIN B-12: CPT | Performed by: INTERNAL MEDICINE

## 2017-09-06 PROCEDURE — 71260 CT THORAX DX C+: CPT

## 2017-09-06 PROCEDURE — 0 IOPAMIDOL 61 % SOLUTION: Performed by: INTERNAL MEDICINE

## 2017-09-06 RX ADMIN — IOPAMIDOL 85 ML: 612 INJECTION, SOLUTION INTRAVENOUS at 08:11

## 2017-09-06 RX ADMIN — DIATRIZOATE MEGLUMINE AND DIATRIZOATE SODIUM 30 ML: 660; 100 LIQUID ORAL; RECTAL at 07:43

## 2017-09-11 LAB — CGA SERPL-SCNC: 1 NMOL/L (ref 0–5)

## 2017-09-12 ENCOUNTER — INFUSION (OUTPATIENT)
Dept: ONCOLOGY | Facility: HOSPITAL | Age: 47
End: 2017-09-12

## 2017-09-12 ENCOUNTER — OFFICE VISIT (OUTPATIENT)
Dept: ONCOLOGY | Facility: CLINIC | Age: 47
End: 2017-09-12

## 2017-09-12 ENCOUNTER — APPOINTMENT (OUTPATIENT)
Dept: LAB | Facility: HOSPITAL | Age: 47
End: 2017-09-12

## 2017-09-12 VITALS
SYSTOLIC BLOOD PRESSURE: 120 MMHG | RESPIRATION RATE: 14 BRPM | BODY MASS INDEX: 30.46 KG/M2 | WEIGHT: 212.8 LBS | OXYGEN SATURATION: 99 % | TEMPERATURE: 98.4 F | HEIGHT: 70 IN | HEART RATE: 75 BPM | DIASTOLIC BLOOD PRESSURE: 80 MMHG

## 2017-09-12 DIAGNOSIS — D3A.012 CARCINOID TUMOR OF ILEUM: ICD-10-CM

## 2017-09-12 DIAGNOSIS — E53.8 B12 DEFICIENCY: Primary | ICD-10-CM

## 2017-09-12 DIAGNOSIS — E53.8 VITAMIN B12 DEFICIENCY DUE TO INTESTINAL MALABSORPTION: ICD-10-CM

## 2017-09-12 DIAGNOSIS — D3A.00 CARCINOID TUMOR: Primary | ICD-10-CM

## 2017-09-12 DIAGNOSIS — K90.9 VITAMIN B12 DEFICIENCY DUE TO INTESTINAL MALABSORPTION: ICD-10-CM

## 2017-09-12 PROCEDURE — 25010000002 CYANOCOBALAMIN PER 1000 MCG: Performed by: INTERNAL MEDICINE

## 2017-09-12 PROCEDURE — 96372 THER/PROPH/DIAG INJ SC/IM: CPT | Performed by: INTERNAL MEDICINE

## 2017-09-12 PROCEDURE — 99213 OFFICE O/P EST LOW 20 MIN: CPT | Performed by: INTERNAL MEDICINE

## 2017-09-12 RX ORDER — LANOLIN ALCOHOL/MO/W.PET/CERES
1000 CREAM (GRAM) TOPICAL DAILY
Qty: 90 TABLET | Refills: 3 | Status: SHIPPED | OUTPATIENT
Start: 2017-09-12

## 2017-09-12 RX ORDER — CYANOCOBALAMIN 1000 UG/ML
1000 INJECTION, SOLUTION INTRAMUSCULAR; SUBCUTANEOUS ONCE
Status: COMPLETED | OUTPATIENT
Start: 2017-09-12 | End: 2017-09-12

## 2017-09-12 RX ORDER — CYANOCOBALAMIN 1000 UG/ML
1000 INJECTION, SOLUTION INTRAMUSCULAR; SUBCUTANEOUS ONCE
Status: CANCELLED | OUTPATIENT
Start: 2017-10-03

## 2017-09-12 RX ADMIN — CYANOCOBALAMIN 1000 MCG: 1000 INJECTION, SOLUTION INTRAMUSCULAR; SUBCUTANEOUS at 16:48

## 2017-09-12 NOTE — PROGRESS NOTES
Subjective     REASON FOR FOLLOW UP:  RESECTED CARCINOID TUMOR of ILEUM    HISTORY OF PRESENT ILLNESS:  The patient is a 47 y.o. year old male who was having some abdominal complaints which led to a colonoscopy with Dr. Vo on 8/16/2016.  He was found to have a polyp in the terminal ileum which was biopsied and showed carcinoid tumor.  He subsequently underwent a staging CT scan of the abdomen and pelvis on 8/26/2016 which showed no evidence of distant metastases but did show a 1.7 cm regional lymph node.    The patient underwent a laparoscopic right hemicolectomy with resection of the terminal ileum and appendectomy performed by Dr Bedolla on 8/29/2016.  The pathology from that specimen as noted below showed an 8 mm carcinoid tumor in the terminal ileum with 1 out of 16 lymph nodes positive for metastatic carcinoid tumor (T3 N1 M0).    She was seen for initial consultation in our office in September 2016.  It was determined that he did not require any adjuvant treatment but we set up a surveillance schedule every 6 months or so over the next 2 years to continue to monitor for recurrence.      He returns today for his 12 month follow-up visit with CT scans and labs.  He seems to be getting along well.  The CT scan from 9/6/2017 showed no evidence of recurrent or metastatic disease.  His serum chromogranin A level was  1.  His B12 level was in the normal range at 560.    Since his last visit here, he would was having some diarrhea issues and was tested in found to have heme-positive stool.  He subsequently underwent a follow-up colonoscopy with Dr. Vo on 8/31/2017 showing some ulcerated changes at the anastomosis.  He will be scheduled for repeat colonoscopy in 3 years.    History of Present Illness     Past Medical History:   Diagnosis Date   • Anxiety    • Blood in stool    • Cancer     Terminal ileum   • Colon polyp    • Diarrhea    • Dizziness    • Heart palpitations     Patient takes Atenolol   •  History of kidney stones    • Hypercholesterolemia    • Hypertension    • Nephrolithiasis         Past Surgical History:   Procedure Laterality Date   • COLON RESECTION Right 8/29/2016    Procedure: COLON RESECTION RIGHT LAPAROSCOPIC;  Surgeon: Baron Bedolla MD;  Location: [facility] MAIN OR;  Service:    • COLONOSCOPY  05/21/2009    ih, tics    • COLONOSCOPY N/A 8/16/2016    Procedure: COLONOSCOPYWITH BIOPSIES AND COLD POLYPECTOMY TIMES 1 ;  Surgeon: Will Vo MD;  Location: [facility] ENDOSCOPY;  Service:    • COLONOSCOPY N/A 8/31/2017    Procedure: COLONOSCOPY TO FRANCIS TERMINAL ILEUM ;  Surgeon: Will Vo MD;  Location: [facility] ENDOSCOPY;  Service:    • TONSILLECTOMY      CHILDHOOD        Current Outpatient Prescriptions on File Prior to Visit   Medication Sig Dispense Refill   • atenolol (TENORMIN) 25 MG tablet Take 1 tablet by mouth Every Evening. Takes 12.5 (Patient taking differently: Take 12.5 mg by mouth Every Evening. Takes 12.5) 90 tablet 3   • Eluxadoline 100 MG tablet Take 1 tablet by mouth 2 (Two) Times a Day. 60 tablet 3   • ibuprofen (ADVIL,MOTRIN) 400 MG tablet Take 400 mg by mouth as needed for mild pain (1-3).     • VIT B12-METHIONINE-INOS-CHOL IM Inject 1,000 mcg into the shoulder, thigh, or buttocks Every 30 (Thirty) Days.     • VYTORIN 10-40 MG per tablet TAKE 1 TABLET DAILY 90 tablet 3     No current facility-administered medications on file prior to visit.         ALLERGIES:    Allergies   Allergen Reactions   • Penicillins      Patient unsure had as a child        Social History     Social History   • Marital status:      Spouse name: Jihan   • Number of children: N/A   • Years of education: College     Occupational History   •  [employer]     Social History Main Topics   • Smoking status: Never Smoker   • Smokeless tobacco: Never Used   • Alcohol use Yes      Comment: Socially   • Drug use: No   • Sexual activity: Defer     Other Topics Concern   • Not on  "file     Social History Narrative        Family History   Problem Relation Age of Onset   • Memory loss Mother    • Heart disease Father    • Other Father      CABG   • Colon polyps Father    • Malig Hyperthermia Neg Hx         Review of Systems   Constitutional: Negative for activity change, appetite change, fatigue, fever and unexpected weight change.   HENT: Negative for hearing loss, nosebleeds, trouble swallowing and voice change.    Eyes: Negative for visual disturbance.   Respiratory: Negative for cough, shortness of breath and wheezing.    Cardiovascular: Negative for chest pain and palpitations.   Gastrointestinal: Negative for abdominal pain, diarrhea, nausea and vomiting.   Genitourinary: Negative for difficulty urinating, frequency, hematuria and urgency.   Musculoskeletal: Negative for back pain and neck pain.   Skin: Negative for rash.   Neurological: Negative for dizziness, seizures, syncope and headaches.   Hematological: Negative for adenopathy. Does not bruise/bleed easily.   Psychiatric/Behavioral: Negative for behavioral problems. The patient is not nervous/anxious.         Objective     Vitals:    09/12/17 1610   BP: 120/80   Pulse: 75   Resp: 14   Temp: 98.4 °F (36.9 °C)   TempSrc: Oral   SpO2: 99%   Weight: 212 lb 12.8 oz (96.5 kg)   Height: 70.08\" (178 cm)  Comment: new height without shoes   PainSc: 0-No pain     Current Status 9/12/2017   ECOG score 0       Physical Exam   Constitutional: He is oriented to person, place, and time. He appears well-developed and well-nourished. No distress.   HENT:   Head: Normocephalic.   Eyes: Conjunctivae and EOM are normal. Pupils are equal, round, and reactive to light. No scleral icterus.   Neck: Normal range of motion. Neck supple. No JVD present. No thyromegaly present.   Cardiovascular: Normal rate and regular rhythm.  Exam reveals no gallop and no friction rub.    No murmur heard.  Pulmonary/Chest: Effort normal and breath sounds normal. He has no " wheezes. He has no rales.   Abdominal: Soft. He exhibits no distension and no mass. There is no tenderness.   Healed surgical scar RLQ.   Musculoskeletal: Normal range of motion. He exhibits no edema or deformity.   Lymphadenopathy:     He has no cervical adenopathy.   Neurological: He is alert and oriented to person, place, and time. He has normal reflexes. No cranial nerve deficit.   Skin: Skin is warm and dry. No rash noted. No erythema.   Psychiatric: He has a normal mood and affect. His behavior is normal. Judgment normal.         RECENT LABS:  Hematology WBC   Date Value Ref Range Status   09/06/2017 5.12 4.00 - 10.00 10*3/mm3 Final     RBC   Date Value Ref Range Status   09/06/2017 5.29 4.30 - 5.50 10*6/mm3 Final     Hemoglobin   Date Value Ref Range Status   09/06/2017 16.2 13.5 - 16.5 g/dL Final     Hematocrit   Date Value Ref Range Status   09/06/2017 48.5 40.0 - 49.0 % Final     Platelets   Date Value Ref Range Status   09/06/2017 217 150 - 375 10*3/mm3 Final        Lab Results   Component Value Date    WYJAUACT41 560 09/06/2017     Chromogranin A 0 - 5 nmol/L 1       Lab Results   Component Value Date    GLUCOSE 70 (L) 09/06/2017    BUN 15 09/06/2017    CREATININE 0.80 09/06/2017    EGFRIFNONA 103 09/06/2017    EGFRIFAFRI  09/16/2016      Comment:      <15 Indicative of kidney failure.    BCR 18.8 09/06/2017    K 4.2 09/06/2017    CO2 26.2 09/06/2017    CALCIUM 9.7 09/06/2017    ALBUMIN 4.80 09/06/2017    LABIL2 1.7 09/06/2017    AST 25 09/06/2017    ALT 47 (H) 09/06/2017     CT A/P 9/6/2017  IMPRESSION:  Stable examination. There is no convincing evidence for  metastatic disease and there is no new abnormality.    Assessment/Plan     1.  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  Patient has no evidence of distant metastatic disease on recent scans and lab studies as noted above.    2.  Resection of the terminal ileum putting him at  risk for future B12 deficiency.    Plan  1.  I discussed the results of the labs and CT scans from one week ago with the patient.  At this point we would plan to continue surveillance with additional labs and scans in 6 months.  2.  At this point we will change to a daily B12 tablets as maintenance 1000 µg by mouth daily.  He will receive one final B12 shot in our office today as part of this visit.  3.  He'll return for M.D. follow-up in 6 months and again will have labs and a CT scan performed 1 week prior to visit.  4.  He will follow-up with Dr. Vo for further surveillance colonoscopies.

## 2017-09-26 ENCOUNTER — TELEPHONE (OUTPATIENT)
Dept: ONCOLOGY | Facility: CLINIC | Age: 47
End: 2017-09-26

## 2017-09-26 NOTE — TELEPHONE ENCOUNTER
Pt called and explained that Dr Buenrostro had sent a prescription in for B12 1000mcg and it was denied.  Called pt and left message that the med can be purchased over the counter.  If any other questions to call office.

## 2017-12-01 DIAGNOSIS — I10 ESSENTIAL HYPERTENSION: ICD-10-CM

## 2017-12-01 RX ORDER — EZETIMIBE AND SIMVASTATIN 10; 40 MG/1; MG/1
TABLET ORAL
Qty: 90 TABLET | Refills: 3 | Status: SHIPPED | OUTPATIENT
Start: 2017-12-01 | End: 2019-01-07 | Stop reason: SDUPTHER

## 2017-12-01 RX ORDER — ATENOLOL 25 MG/1
TABLET ORAL
Qty: 45 TABLET | Refills: 3 | Status: SHIPPED | OUTPATIENT
Start: 2017-12-01 | End: 2019-01-07 | Stop reason: SDUPTHER

## 2018-03-13 ENCOUNTER — HOSPITAL ENCOUNTER (OUTPATIENT)
Dept: CT IMAGING | Facility: HOSPITAL | Age: 48
Discharge: HOME | End: 2018-03-13
Attending: INTERNAL MEDICINE | Admitting: INTERNAL MEDICINE

## 2018-03-13 DIAGNOSIS — D3A.00 CARCINOID TUMOR: ICD-10-CM

## 2018-03-13 DIAGNOSIS — K90.9 VITAMIN B12 DEFICIENCY DUE TO INTESTINAL MALABSORPTION: ICD-10-CM

## 2018-03-13 DIAGNOSIS — D3A.012 CARCINOID TUMOR OF ILEUM: ICD-10-CM

## 2018-03-13 DIAGNOSIS — E53.8 VITAMIN B12 DEFICIENCY DUE TO INTESTINAL MALABSORPTION: ICD-10-CM

## 2018-03-13 LAB
ALBUMIN SERPL-MCNC: 4.9 G/DL (ref 3.5–5.2)
ALBUMIN/GLOB SERPL: 1.9 G/DL
ALP SERPL-CCNC: 52 U/L (ref 39–117)
ALT SERPL W P-5'-P-CCNC: 54 U/L (ref 1–41)
ANION GAP SERPL CALCULATED.3IONS-SCNC: 11.3 MMOL/L
AST SERPL-CCNC: 35 U/L (ref 1–40)
BASOPHILS # BLD AUTO: 0.03 10*3/MM3 (ref 0–0.2)
BASOPHILS NFR BLD AUTO: 0.5 % (ref 0–1.5)
BILIRUB SERPL-MCNC: 0.8 MG/DL (ref 0.1–1.2)
BUN BLD-MCNC: 9 MG/DL (ref 6–20)
BUN/CREAT SERPL: 10 (ref 7–25)
CALCIUM SPEC-SCNC: 9.6 MG/DL (ref 8.6–10.5)
CHLORIDE SERPL-SCNC: 100 MMOL/L (ref 98–107)
CO2 SERPL-SCNC: 29.7 MMOL/L (ref 22–29)
CREAT BLD-MCNC: 0.9 MG/DL (ref 0.76–1.27)
CREAT BLDA-MCNC: 0.9 MG/DL (ref 0.6–1.3)
DEPRECATED RDW RBC AUTO: 43 FL (ref 37–54)
EOSINOPHIL # BLD AUTO: 0.07 10*3/MM3 (ref 0–0.7)
EOSINOPHIL NFR BLD AUTO: 1.2 % (ref 0.3–6.2)
ERYTHROCYTE [DISTWIDTH] IN BLOOD BY AUTOMATED COUNT: 13.1 % (ref 11.5–14.5)
GFR SERPL CREATININE-BSD FRML MDRD: 90 ML/MIN/1.73
GLOBULIN UR ELPH-MCNC: 2.6 GM/DL
GLUCOSE BLD-MCNC: 110 MG/DL (ref 65–99)
HCT VFR BLD AUTO: 45.1 % (ref 40.4–52.2)
HGB BLD-MCNC: 15.1 G/DL (ref 13.7–17.6)
IMM GRANULOCYTES # BLD: 0 10*3/MM3 (ref 0–0.03)
IMM GRANULOCYTES NFR BLD: 0 % (ref 0–0.5)
LYMPHOCYTES # BLD AUTO: 2.14 10*3/MM3 (ref 0.9–4.8)
LYMPHOCYTES NFR BLD AUTO: 35.5 % (ref 19.6–45.3)
MCH RBC QN AUTO: 30.3 PG (ref 27–32.7)
MCHC RBC AUTO-ENTMCNC: 33.5 G/DL (ref 32.6–36.4)
MCV RBC AUTO: 90.4 FL (ref 79.8–96.2)
MONOCYTES # BLD AUTO: 0.61 10*3/MM3 (ref 0.2–1.2)
MONOCYTES NFR BLD AUTO: 10.1 % (ref 5–12)
NEUTROPHILS # BLD AUTO: 3.17 10*3/MM3 (ref 1.9–8.1)
NEUTROPHILS NFR BLD AUTO: 52.7 % (ref 42.7–76)
PLATELET # BLD AUTO: 200 10*3/MM3 (ref 140–500)
PMV BLD AUTO: 9.5 FL (ref 6–12)
POTASSIUM BLD-SCNC: 4.2 MMOL/L (ref 3.5–5.2)
PROT SERPL-MCNC: 7.5 G/DL (ref 6–8.5)
RBC # BLD AUTO: 4.99 10*6/MM3 (ref 4.6–6)
SODIUM BLD-SCNC: 141 MMOL/L (ref 136–145)
WBC NRBC COR # BLD: 6.02 10*3/MM3 (ref 4.5–10.7)

## 2018-03-13 PROCEDURE — 82565 ASSAY OF CREATININE: CPT

## 2018-03-13 PROCEDURE — 74177 CT ABD & PELVIS W/CONTRAST: CPT

## 2018-03-13 PROCEDURE — 85025 COMPLETE CBC W/AUTO DIFF WBC: CPT | Performed by: INTERNAL MEDICINE

## 2018-03-13 PROCEDURE — 0 IOPAMIDOL 61 % SOLUTION: Performed by: INTERNAL MEDICINE

## 2018-03-13 PROCEDURE — 80053 COMPREHEN METABOLIC PANEL: CPT | Performed by: INTERNAL MEDICINE

## 2018-03-13 PROCEDURE — 86316 IMMUNOASSAY TUMOR OTHER: CPT | Performed by: INTERNAL MEDICINE

## 2018-03-13 RX ADMIN — IOPAMIDOL 85 ML: 612 INJECTION, SOLUTION INTRAVENOUS at 07:29

## 2018-03-15 LAB — CGA SERPL-SCNC: 6 NMOL/L (ref 0–5)

## 2018-03-26 ENCOUNTER — OFFICE VISIT (OUTPATIENT)
Dept: ONCOLOGY | Facility: CLINIC | Age: 48
End: 2018-03-26

## 2018-03-26 ENCOUNTER — APPOINTMENT (OUTPATIENT)
Dept: LAB | Facility: HOSPITAL | Age: 48
End: 2018-03-26

## 2018-03-26 VITALS
OXYGEN SATURATION: 99 % | DIASTOLIC BLOOD PRESSURE: 92 MMHG | RESPIRATION RATE: 14 BRPM | SYSTOLIC BLOOD PRESSURE: 132 MMHG | TEMPERATURE: 97.7 F | HEIGHT: 71 IN | HEART RATE: 69 BPM | BODY MASS INDEX: 31.86 KG/M2 | WEIGHT: 227.6 LBS

## 2018-03-26 DIAGNOSIS — D3A.012 CARCINOID TUMOR OF ILEUM: Primary | ICD-10-CM

## 2018-03-26 DIAGNOSIS — K90.9 VITAMIN B12 DEFICIENCY DUE TO INTESTINAL MALABSORPTION: ICD-10-CM

## 2018-03-26 DIAGNOSIS — E53.8 VITAMIN B12 DEFICIENCY DUE TO INTESTINAL MALABSORPTION: ICD-10-CM

## 2018-03-26 DIAGNOSIS — D3A.00 CARCINOID TUMOR: ICD-10-CM

## 2018-03-26 PROCEDURE — G0463 HOSPITAL OUTPT CLINIC VISIT: HCPCS | Performed by: INTERNAL MEDICINE

## 2018-03-26 PROCEDURE — 99214 OFFICE O/P EST MOD 30 MIN: CPT | Performed by: INTERNAL MEDICINE

## 2018-03-26 RX ORDER — NICOTINE POLACRILEX 4 MG/1
GUM, CHEWING ORAL
COMMUNITY
Start: 2018-02-11 | End: 2018-09-24

## 2018-06-25 ENCOUNTER — CLINICAL SUPPORT (OUTPATIENT)
Dept: ONCOLOGY | Facility: HOSPITAL | Age: 48
End: 2018-06-25

## 2018-06-25 ENCOUNTER — LAB (OUTPATIENT)
Dept: LAB | Facility: HOSPITAL | Age: 48
End: 2018-06-25

## 2018-06-25 DIAGNOSIS — E53.8 VITAMIN B12 DEFICIENCY DUE TO INTESTINAL MALABSORPTION: ICD-10-CM

## 2018-06-25 DIAGNOSIS — D3A.012 CARCINOID TUMOR OF ILEUM: ICD-10-CM

## 2018-06-25 DIAGNOSIS — K90.9 VITAMIN B12 DEFICIENCY DUE TO INTESTINAL MALABSORPTION: ICD-10-CM

## 2018-06-25 DIAGNOSIS — D3A.00 CARCINOID TUMOR: ICD-10-CM

## 2018-06-25 LAB
ALBUMIN SERPL-MCNC: 4.4 G/DL (ref 3.5–5.2)
ALBUMIN/GLOB SERPL: 1.5 G/DL (ref 1.1–2.4)
ALP SERPL-CCNC: 59 U/L (ref 38–116)
ALT SERPL W P-5'-P-CCNC: 40 U/L (ref 0–41)
ANION GAP SERPL CALCULATED.3IONS-SCNC: 9.8 MMOL/L
AST SERPL-CCNC: 24 U/L (ref 0–40)
BASOPHILS # BLD AUTO: 0.04 10*3/MM3 (ref 0–0.1)
BASOPHILS NFR BLD AUTO: 0.8 % (ref 0–1.1)
BILIRUB SERPL-MCNC: 0.9 MG/DL (ref 0.1–1.2)
BUN BLD-MCNC: 12 MG/DL (ref 6–20)
BUN/CREAT SERPL: 14 (ref 7.3–30)
CALCIUM SPEC-SCNC: 9.5 MG/DL (ref 8.5–10.2)
CHLORIDE SERPL-SCNC: 103 MMOL/L (ref 98–107)
CO2 SERPL-SCNC: 28.2 MMOL/L (ref 22–29)
CREAT BLD-MCNC: 0.86 MG/DL (ref 0.7–1.3)
DEPRECATED RDW RBC AUTO: 41.5 FL (ref 37–49)
EOSINOPHIL # BLD AUTO: 0.06 10*3/MM3 (ref 0–0.36)
EOSINOPHIL NFR BLD AUTO: 1.2 % (ref 1–5)
ERYTHROCYTE [DISTWIDTH] IN BLOOD BY AUTOMATED COUNT: 12.8 % (ref 11.7–14.5)
GFR SERPL CREATININE-BSD FRML MDRD: 95 ML/MIN/1.73
GLOBULIN UR ELPH-MCNC: 2.9 GM/DL (ref 1.8–3.5)
GLUCOSE BLD-MCNC: 105 MG/DL (ref 74–124)
HCT VFR BLD AUTO: 44.6 % (ref 40–49)
HGB BLD-MCNC: 15 G/DL (ref 13.5–16.5)
IMM GRANULOCYTES # BLD: 0.01 10*3/MM3 (ref 0–0.03)
IMM GRANULOCYTES NFR BLD: 0.2 % (ref 0–0.5)
LYMPHOCYTES # BLD AUTO: 1.96 10*3/MM3 (ref 1–3.5)
LYMPHOCYTES NFR BLD AUTO: 37.8 % (ref 20–49)
MCH RBC QN AUTO: 30 PG (ref 27–33)
MCHC RBC AUTO-ENTMCNC: 33.6 G/DL (ref 32–35)
MCV RBC AUTO: 89.2 FL (ref 83–97)
MONOCYTES # BLD AUTO: 0.46 10*3/MM3 (ref 0.25–0.8)
MONOCYTES NFR BLD AUTO: 8.9 % (ref 4–12)
NEUTROPHILS # BLD AUTO: 2.65 10*3/MM3 (ref 1.5–7)
NEUTROPHILS NFR BLD AUTO: 51.1 % (ref 39–75)
NRBC BLD MANUAL-RTO: 0 /100 WBC (ref 0–0)
PLATELET # BLD AUTO: 203 10*3/MM3 (ref 150–375)
PMV BLD AUTO: 8.9 FL (ref 8.9–12.1)
POTASSIUM BLD-SCNC: 4.7 MMOL/L (ref 3.5–4.7)
PROT SERPL-MCNC: 7.3 G/DL (ref 6.3–8)
RBC # BLD AUTO: 5 10*6/MM3 (ref 4.3–5.5)
SODIUM BLD-SCNC: 141 MMOL/L (ref 134–145)
VIT B12 BLD-MCNC: 764 PG/ML (ref 211–946)
WBC NRBC COR # BLD: 5.18 10*3/MM3 (ref 4–10)

## 2018-06-25 PROCEDURE — 80053 COMPREHEN METABOLIC PANEL: CPT | Performed by: INTERNAL MEDICINE

## 2018-06-25 PROCEDURE — 85025 COMPLETE CBC W/AUTO DIFF WBC: CPT | Performed by: INTERNAL MEDICINE

## 2018-06-25 PROCEDURE — 82607 VITAMIN B-12: CPT | Performed by: INTERNAL MEDICINE

## 2018-06-25 PROCEDURE — 36415 COLL VENOUS BLD VENIPUNCTURE: CPT | Performed by: INTERNAL MEDICINE

## 2018-06-25 NOTE — PROGRESS NOTES
CBC satisfactory for pt at this time.  He is mostly here to have his cromogranin A level checked.  This lab was drawn and  states that it takes 3 days to get resulted.  It is sent out to [facility].  Pt aware and v/u and will check MyChart this week to see if it's resulted yet.  Copy of CBC given and pt denies any complaints.

## 2018-06-27 LAB — CGA SERPL-SCNC: <1 NMOL/L (ref 0–5)

## 2018-09-17 ENCOUNTER — LAB (OUTPATIENT)
Dept: LAB | Facility: HOSPITAL | Age: 48
End: 2018-09-17

## 2018-09-17 ENCOUNTER — HOSPITAL ENCOUNTER (OUTPATIENT)
Dept: PET IMAGING | Facility: HOSPITAL | Age: 48
Discharge: HOME | End: 2018-09-17
Attending: INTERNAL MEDICINE | Admitting: INTERNAL MEDICINE

## 2018-09-17 DIAGNOSIS — K90.9 VITAMIN B12 DEFICIENCY DUE TO INTESTINAL MALABSORPTION: ICD-10-CM

## 2018-09-17 DIAGNOSIS — D3A.012 CARCINOID TUMOR OF ILEUM: ICD-10-CM

## 2018-09-17 DIAGNOSIS — E53.8 VITAMIN B12 DEFICIENCY DUE TO INTESTINAL MALABSORPTION: ICD-10-CM

## 2018-09-17 DIAGNOSIS — D3A.00 CARCINOID TUMOR: ICD-10-CM

## 2018-09-17 LAB
ALBUMIN SERPL-MCNC: 4.6 G/DL (ref 3.5–5.2)
ALBUMIN/GLOB SERPL: 1.8 G/DL (ref 1.1–2.4)
ALP SERPL-CCNC: 58 U/L (ref 38–116)
ALT SERPL W P-5'-P-CCNC: 43 U/L (ref 0–41)
ANION GAP SERPL CALCULATED.3IONS-SCNC: 13.7 MMOL/L
AST SERPL-CCNC: 23 U/L (ref 0–40)
BASOPHILS # BLD AUTO: 0.02 10*3/MM3 (ref 0–0.1)
BASOPHILS NFR BLD AUTO: 0.4 % (ref 0–1.1)
BILIRUB SERPL-MCNC: 0.9 MG/DL (ref 0.1–1.2)
BUN BLD-MCNC: 16 MG/DL (ref 6–20)
BUN/CREAT SERPL: 21.1 (ref 7.3–30)
CALCIUM SPEC-SCNC: 9.3 MG/DL (ref 8.5–10.2)
CHLORIDE SERPL-SCNC: 100 MMOL/L (ref 98–107)
CO2 SERPL-SCNC: 26.3 MMOL/L (ref 22–29)
CREAT BLD-MCNC: 0.76 MG/DL (ref 0.7–1.3)
CREAT BLDA-MCNC: 0.8 MG/DL (ref 0.6–1.3)
DEPRECATED RDW RBC AUTO: 44.4 FL (ref 37–49)
EOSINOPHIL # BLD AUTO: 0.07 10*3/MM3 (ref 0–0.36)
EOSINOPHIL NFR BLD AUTO: 1.4 % (ref 1–5)
ERYTHROCYTE [DISTWIDTH] IN BLOOD BY AUTOMATED COUNT: 13.1 % (ref 11.7–14.5)
GFR SERPL CREATININE-BSD FRML MDRD: 109 ML/MIN/1.73
GLOBULIN UR ELPH-MCNC: 2.6 GM/DL (ref 1.8–3.5)
GLUCOSE BLD-MCNC: 76 MG/DL (ref 74–124)
HCT VFR BLD AUTO: 44.4 % (ref 40–49)
HGB BLD-MCNC: 14.5 G/DL (ref 13.5–16.5)
IMM GRANULOCYTES # BLD: 0.01 10*3/MM3 (ref 0–0.03)
IMM GRANULOCYTES NFR BLD: 0.2 % (ref 0–0.5)
LYMPHOCYTES # BLD AUTO: 1.78 10*3/MM3 (ref 1–3.5)
LYMPHOCYTES NFR BLD AUTO: 34.8 % (ref 20–49)
MCH RBC QN AUTO: 30.1 PG (ref 27–33)
MCHC RBC AUTO-ENTMCNC: 32.7 G/DL (ref 32–35)
MCV RBC AUTO: 92.3 FL (ref 83–97)
MONOCYTES # BLD AUTO: 0.45 10*3/MM3 (ref 0.25–0.8)
MONOCYTES NFR BLD AUTO: 8.8 % (ref 4–12)
NEUTROPHILS # BLD AUTO: 2.79 10*3/MM3 (ref 1.5–7)
NEUTROPHILS NFR BLD AUTO: 54.4 % (ref 39–75)
NRBC BLD MANUAL-RTO: 0 /100 WBC (ref 0–0)
PLATELET # BLD AUTO: 200 10*3/MM3 (ref 150–375)
PMV BLD AUTO: 9.3 FL (ref 8.9–12.1)
POTASSIUM BLD-SCNC: 4 MMOL/L (ref 3.5–4.7)
PROT SERPL-MCNC: 7.2 G/DL (ref 6.3–8)
RBC # BLD AUTO: 4.81 10*6/MM3 (ref 4.3–5.5)
SODIUM BLD-SCNC: 140 MMOL/L (ref 134–145)
WBC NRBC COR # BLD: 5.12 10*3/MM3 (ref 4–10)

## 2018-09-17 PROCEDURE — 82565 ASSAY OF CREATININE: CPT

## 2018-09-17 PROCEDURE — 0 DIATRIZOATE MEGLUMINE & SODIUM PER 1 ML: Performed by: INTERNAL MEDICINE

## 2018-09-17 PROCEDURE — 85025 COMPLETE CBC W/AUTO DIFF WBC: CPT | Performed by: INTERNAL MEDICINE

## 2018-09-17 PROCEDURE — 74177 CT ABD & PELVIS W/CONTRAST: CPT

## 2018-09-17 PROCEDURE — 25010000002 IOPAMIDOL 61 % SOLUTION: Performed by: INTERNAL MEDICINE

## 2018-09-17 PROCEDURE — 36415 COLL VENOUS BLD VENIPUNCTURE: CPT | Performed by: INTERNAL MEDICINE

## 2018-09-17 PROCEDURE — 80053 COMPREHEN METABOLIC PANEL: CPT | Performed by: INTERNAL MEDICINE

## 2018-09-17 RX ADMIN — IOPAMIDOL 85 ML: 612 INJECTION, SOLUTION INTRAVENOUS at 08:22

## 2018-09-17 RX ADMIN — DIATRIZOATE MEGLUMINE AND DIATRIZOATE SODIUM 30 ML: 660; 100 LIQUID ORAL; RECTAL at 07:40

## 2018-09-20 LAB — CGA SERPL-SCNC: 3 NMOL/L (ref 0–5)

## 2018-09-24 ENCOUNTER — APPOINTMENT (OUTPATIENT)
Dept: LAB | Facility: HOSPITAL | Age: 48
End: 2018-09-24

## 2018-09-24 ENCOUNTER — OFFICE VISIT (OUTPATIENT)
Dept: ONCOLOGY | Facility: CLINIC | Age: 48
End: 2018-09-24

## 2018-09-24 VITALS
SYSTOLIC BLOOD PRESSURE: 142 MMHG | HEIGHT: 71 IN | RESPIRATION RATE: 12 BRPM | DIASTOLIC BLOOD PRESSURE: 90 MMHG | HEART RATE: 61 BPM | OXYGEN SATURATION: 99 % | WEIGHT: 225.4 LBS | TEMPERATURE: 98.3 F | BODY MASS INDEX: 31.56 KG/M2

## 2018-09-24 DIAGNOSIS — D3A.012 CARCINOID TUMOR OF ILEUM: Primary | ICD-10-CM

## 2018-09-24 PROCEDURE — G0463 HOSPITAL OUTPT CLINIC VISIT: HCPCS | Performed by: INTERNAL MEDICINE

## 2018-09-24 PROCEDURE — 99214 OFFICE O/P EST MOD 30 MIN: CPT | Performed by: INTERNAL MEDICINE

## 2018-09-24 NOTE — PROGRESS NOTES
Subjective     REASON FOR FOLLOW UP:  RESECTED CARCINOID TUMOR of ILEUM    HISTORY OF PRESENT ILLNESS:  The patient is a 48 y.o. year old male who was having some abdominal complaints which led to a colonoscopy with Dr. Vo on 8/16/2016.  He was found to have a polyp in the terminal ileum which was biopsied and showed carcinoid tumor.  He subsequently underwent a staging CT scan of the abdomen and pelvis on 8/26/2016 which showed no evidence of distant metastases but did show a 1.7 cm regional lymph node.    The patient underwent a laparoscopic right hemicolectomy with resection of the terminal ileum and appendectomy performed by Dr Bedolla on 8/29/2016.  The pathology from that specimen as noted below showed an 8 mm carcinoid tumor in the terminal ileum with 1 out of 16 lymph nodes positive for metastatic carcinoid tumor (T3 N1 M0).    He was seen for initial consultation in our office in September 2016.  It was determined that he did not require any adjuvant treatment but we set up a surveillance schedule every 6 months or so over the next 2 years to continue to monitor for recurrence.      He returns today for his 24 month follow-up visit with CT scans and labs.  He seems to be getting along well.  The CT scan from 9/17/2018 showed no evidence of recurrent or metastatic disease.  His serum chromogranin A level was normal with a value of 3.      His next colonoscopy with Dr. Vo will be in August 2020.    History of Present Illness     Past Medical History:   Diagnosis Date   • Anxiety    • Blood in stool    • Cancer (CMS/HCC)     Terminal ileum   • Colon polyp    • Diarrhea    • Dizziness    • Heart palpitations     Patient takes Atenolol   • History of kidney stones    • Hypercholesterolemia    • Hypertension    • Nephrolithiasis         Past Surgical History:   Procedure Laterality Date   • COLON RESECTION Right 8/29/2016    Procedure: COLON RESECTION RIGHT LAPAROSCOPIC;  Surgeon: Baron Bedolla MD;   Location: [facility] MAIN OR;  Service:    • COLONOSCOPY  05/21/2009    ih, tics    • COLONOSCOPY N/A 8/16/2016    Procedure: COLONOSCOPYWITH BIOPSIES AND COLD POLYPECTOMY TIMES 1 ;  Surgeon: Will Vo MD;  Location: [facility] ENDOSCOPY;  Service:    • COLONOSCOPY N/A 8/31/2017    Procedure: COLONOSCOPY TO FRANCIS TERMINAL ILEUM ;  Surgeon: Will Vo MD;  Location: [facility] ENDOSCOPY;  Service:    • TONSILLECTOMY      CHILDHOOD        ALLERGIES:    Allergies   Allergen Reactions   • Penicillins      Patient unsure had as a child         Review of Systems   Constitutional: Negative for activity change, appetite change, fatigue, fever and unexpected weight change.   HENT: Negative for hearing loss, nosebleeds, trouble swallowing and voice change.    Eyes: Negative for visual disturbance.   Respiratory: Negative for cough, shortness of breath and wheezing.    Cardiovascular: Negative for chest pain and palpitations.   Gastrointestinal: Negative for abdominal pain, diarrhea, nausea and vomiting.        Acid reflux   Genitourinary: Negative for difficulty urinating, frequency, hematuria and urgency.   Musculoskeletal: Negative for back pain and neck pain.   Skin: Negative for rash.   Neurological: Negative for dizziness, seizures, syncope and headaches.   Hematological: Negative for adenopathy. Does not bruise/bleed easily.   Psychiatric/Behavioral: Negative for behavioral problems. The patient is not nervous/anxious.         Objective     There were no vitals filed for this visit.  Current Status 3/26/2018   ECOG score 0       Physical Exam   Constitutional: He is oriented to person, place, and time. He appears well-developed and well-nourished. No distress.   HENT:   Head: Normocephalic.   Eyes: Pupils are equal, round, and reactive to light. Conjunctivae and EOM are normal. No scleral icterus.   Neck: Normal range of motion. Neck supple. No JVD present. No thyromegaly present.   Cardiovascular: Normal rate and regular  rhythm.  Exam reveals no gallop and no friction rub.    No murmur heard.  Pulmonary/Chest: Effort normal and breath sounds normal. He has no wheezes. He has no rales.   Abdominal: Soft. He exhibits no distension and no mass. There is no tenderness.   Healed surgical scar RLQ.   Musculoskeletal: Normal range of motion. He exhibits no edema or deformity.   Lymphadenopathy:     He has no cervical adenopathy.   Neurological: He is alert and oriented to person, place, and time. He has normal reflexes. No cranial nerve deficit.   Skin: Skin is warm and dry. No rash noted. No erythema.   Psychiatric: He has a normal mood and affect. His behavior is normal. Judgment normal.         RECENT LABS:  Hematology WBC   Date Value Ref Range Status   09/17/2018 5.12 4.00 - 10.00 10*3/mm3 Final     RBC   Date Value Ref Range Status   09/17/2018 4.81 4.30 - 5.50 10*6/mm3 Final     Hemoglobin   Date Value Ref Range Status   09/17/2018 14.5 13.5 - 16.5 g/dL Final     Hematocrit   Date Value Ref Range Status   09/17/2018 44.4 40.0 - 49.0 % Final     Platelets   Date Value Ref Range Status   09/17/2018 200 150 - 375 10*3/mm3 Final        Lab Results   Component Value Date    XODSGBAQ67 764 06/25/2018 9/17/2018  Chromogranin A 0 - 5 nmol/L  3       Lab Results   Component Value Date    GLUCOSE 76 09/17/2018    BUN 16 09/17/2018    CREATININE 0.80 09/17/2018    EGFRIFNONA 109 09/17/2018    EGFRIFAFRI  09/16/2016      Comment:      <15 Indicative of kidney failure.    BCR 21.1 09/17/2018    K 4.0 09/17/2018    CO2 26.3 09/17/2018    CALCIUM 9.3 09/17/2018    ALBUMIN 4.60 09/17/2018    AST 23 09/17/2018    ALT 43 (H) 09/17/2018     CT A/P 9/17/2018  FINDINGS: The liver demonstrates normal attenuation. No focal hepatic  lesions or intrahepatic biliary dilatation. The gallbladder, spleen and  the pancreas is normal. Bilateral adrenal glands are normal. Both  kidneys are normal in size and attenuation. Punctate 2 mm calculus is  seen within  the superior pole of the left kidney. No hydronephrosis  bilaterally. The urinary bladder is partially distended and normal.  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 The small and large  bowel loops demonstrate normal caliber. There is evidence of prior right  hemicolectomy with an ileocolonic anastomosis that appears unremarkable.  No suspicious mesenteric lymphadenopathy is imaged. Tiny right lower  quadrant mesenteric lymph node measures up to 5 mm and is unchanged from  prior. No pathological retroperitoneal lymphadenopathy. No ascites is  seen. L4-5 degenerative disc disease with vacuum disc phenomena is  seen..     IMPRESSION:  There is no convincing evidence of recurrence or metastatic  disease within the abdomen and pelvis.  Images personally reviewed    Assessment/Plan     1.  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  Patient has no evidence of distant metastatic disease on recent scans now 2 years out from initial diagnosis.  The serum chromogranin A level is normal   2.  Resection of the terminal ileum putting him at risk for future B12 deficiency.    Plan  1.  I discussed the results of the labs and CT scans from one week ago with the patient.    2.  Continue B12 tablets as maintenance 1000 µg by mouth daily.   3.  He otherwise will return for M.D. follow-up in 12 months and again will have labs and a CT scan performed 1 week prior to visit.  4.  He will follow-up with Dr. Vo for further surveillance colonoscopies.

## 2019-01-06 DIAGNOSIS — I10 ESSENTIAL HYPERTENSION: ICD-10-CM

## 2019-01-07 DIAGNOSIS — I10 ESSENTIAL HYPERTENSION: ICD-10-CM

## 2019-01-07 RX ORDER — ATENOLOL 25 MG/1
TABLET ORAL
Qty: 45 TABLET | Refills: 3 | OUTPATIENT
Start: 2019-01-07

## 2019-01-07 RX ORDER — ATENOLOL 25 MG/1
TABLET ORAL
Qty: 45 TABLET | Refills: 3 | Status: SHIPPED | OUTPATIENT
Start: 2019-01-07 | End: 2020-01-15 | Stop reason: SDUPTHER

## 2019-01-07 RX ORDER — EZETIMIBE AND SIMVASTATIN 10; 40 MG/1; MG/1
1 TABLET ORAL DAILY
Qty: 90 TABLET | Refills: 3 | Status: SHIPPED | OUTPATIENT
Start: 2019-01-07 | End: 2020-01-15 | Stop reason: SDUPTHER

## 2019-01-07 RX ORDER — EZETIMIBE AND SIMVASTATIN 10; 40 MG/1; MG/1
1 TABLET ORAL DAILY
Qty: 90 TABLET | Refills: 3 | OUTPATIENT
Start: 2019-01-07

## 2019-05-02 ENCOUNTER — OFFICE VISIT (OUTPATIENT)
Dept: INTERNAL MEDICINE | Facility: CLINIC | Age: 49
End: 2019-05-02

## 2019-05-02 VITALS
OXYGEN SATURATION: 99 % | WEIGHT: 225 LBS | SYSTOLIC BLOOD PRESSURE: 146 MMHG | TEMPERATURE: 98.6 F | HEIGHT: 71 IN | HEART RATE: 85 BPM | DIASTOLIC BLOOD PRESSURE: 90 MMHG | BODY MASS INDEX: 31.5 KG/M2

## 2019-05-02 DIAGNOSIS — K64.9 HEMORRHOIDS, UNSPECIFIED HEMORRHOID TYPE: ICD-10-CM

## 2019-05-02 DIAGNOSIS — K58.0 IRRITABLE BOWEL SYNDROME WITH DIARRHEA: ICD-10-CM

## 2019-05-02 DIAGNOSIS — R19.7 DIARRHEA, UNSPECIFIED TYPE: Primary | ICD-10-CM

## 2019-05-02 LAB
BACTERIA UR QL AUTO: ABNORMAL /HPF
BILIRUB UR QL STRIP: NEGATIVE
CLARITY UR: CLEAR
COLOR UR: YELLOW
GLUCOSE UR STRIP-MCNC: NEGATIVE MG/DL
HGB UR QL STRIP.AUTO: ABNORMAL
HYALINE CASTS UR QL AUTO: ABNORMAL /LPF
KETONES UR QL STRIP: NEGATIVE
LEUKOCYTE ESTERASE UR QL STRIP.AUTO: NEGATIVE
NITRITE UR QL STRIP: NEGATIVE
PH UR STRIP.AUTO: 5.5 [PH] (ref 5–8)
PROT UR QL STRIP: NEGATIVE
RBC # UR: ABNORMAL /HPF
REF LAB TEST METHOD: ABNORMAL
SP GR UR STRIP: 1.01 (ref 1–1.03)
SQUAMOUS #/AREA URNS HPF: ABNORMAL /HPF
UROBILINOGEN UR QL STRIP: ABNORMAL
WBC UR QL AUTO: ABNORMAL /HPF

## 2019-05-02 PROCEDURE — 81001 URINALYSIS AUTO W/SCOPE: CPT | Performed by: NURSE PRACTITIONER

## 2019-05-02 PROCEDURE — 99214 OFFICE O/P EST MOD 30 MIN: CPT | Performed by: NURSE PRACTITIONER

## 2019-05-05 NOTE — PROGRESS NOTES
Subjective   Bill Cano is a 49 y.o. male who presents due to nausea for the past 2 days. He c/o rectal irritation for the past couple of weeks.    He c/o rectal irritation for the past 2 weeks with discomfort, he has a hx of IBS with irregular bowel movements. He does c/o nausea over the past 2 days with loose, watery stools on Wednesday. Denies abdominal pain. No change in appetite.      Irritable Bowel Syndrome   This is a chronic problem. The current episode started more than 1 year ago. The problem occurs intermittently. The problem has been waxing and waning. Associated symptoms include arthralgias (+hx irregular BMS) and nausea. Pertinent negatives include no abdominal pain, change in bowel habit, chest pain, chills, congestion, coughing, fatigue, fever, headaches, joint swelling, sore throat, vomiting or weakness.        The following portions of the patient's history were reviewed and updated as appropriate: allergies, current medications, past social history and problem list.    Past Medical History:   Diagnosis Date   • Anxiety    • Blood in stool    • Cancer (CMS/HCC)     Terminal ileum   • Colon polyp    • Diarrhea    • Dizziness    • Heart palpitations     Patient takes Atenolol   • History of kidney stones    • Hypercholesterolemia    • Hypertension    • Nephrolithiasis          Current Outpatient Medications:   •  atenolol (TENORMIN) 25 MG tablet, Take 1/2 tablet every evening., Disp: 45 tablet, Rfl: 3  •  ezetimibe-simvastatin (VYTORIN) 10-40 MG per tablet, Take 1 tablet by mouth Daily., Disp: 90 tablet, Rfl: 3  •  ibuprofen (ADVIL,MOTRIN) 400 MG tablet, Take 400 mg by mouth as needed for mild pain (1-3)., Disp: , Rfl:   •  vitamin B-12 (CYANOCOBALAMIN) 1000 MCG tablet, Take 1 tablet by mouth Daily., Disp: 90 tablet, Rfl: 3  •  hydrocortisone (ANUSOL-HC) 2.5 % rectal cream, Insert  into the rectum 2 (Two) Times a Day., Disp: 28.35 g, Rfl: 0    Allergies   Allergen Reactions   • Penicillins       "Patient unsure had as a child       Review of Systems   Constitutional: Negative for chills, fatigue, fever and unexpected weight change.   HENT: Negative for congestion, ear pain, postnasal drip, sinus pressure, sore throat and trouble swallowing.    Eyes: Negative for visual disturbance.   Respiratory: Negative for cough, chest tightness and wheezing.    Cardiovascular: Negative for chest pain, palpitations and leg swelling.   Gastrointestinal: Positive for diarrhea and nausea. Negative for abdominal pain, blood in stool, change in bowel habit and vomiting.   Genitourinary: Positive for penile pain (rectal pain radiates into penis). Negative for dysuria, frequency and urgency.   Musculoskeletal: Positive for arthralgias (+hx irregular BMS). Negative for joint swelling.   Skin: Negative for color change.   Neurological: Negative for syncope, weakness and headaches.   Hematological: Does not bruise/bleed easily.       Objective   Vitals:    05/02/19 0931   BP: 146/90   BP Location: Left arm   Patient Position: Sitting   Pulse: 85   Temp: 98.6 °F (37 °C)   SpO2: 99%   Weight: 102 kg (225 lb)   Height: 179.5 cm (70.67\")     Physical Exam   Constitutional: He appears well-developed and well-nourished. He is cooperative. He does not have a sickly appearance. He does not appear ill.   HENT:   Head: Normocephalic.   Right Ear: Hearing, tympanic membrane and external ear normal.   Left Ear: Hearing, tympanic membrane and external ear normal.   Nose: Nose normal. No mucosal edema, rhinorrhea, sinus tenderness or nasal deformity. Right sinus exhibits no maxillary sinus tenderness and no frontal sinus tenderness. Left sinus exhibits no maxillary sinus tenderness and no frontal sinus tenderness.   Mouth/Throat: Oropharynx is clear and moist and mucous membranes are normal. Normal dentition.   Eyes: Conjunctivae and lids are normal. Right eye exhibits no discharge and no exudate. Left eye exhibits no discharge and no exudate. "   Neck: Trachea normal. Carotid bruit is not present. No edema present. No thyroid mass present.   Cardiovascular: Regular rhythm, normal heart sounds and normal pulses.   No murmur heard.  Pulmonary/Chest: Breath sounds normal. No respiratory distress. He has no decreased breath sounds. He has no wheezes. He has no rhonchi. He has no rales.   Abdominal: Soft. There is no tenderness.   Genitourinary: Rectal exam shows external hemorrhoid and tenderness. No penile erythema or penile tenderness. No discharge found.   Lymphadenopathy:        Head (right side): No submental, no submandibular, no tonsillar, no preauricular, no posterior auricular and no occipital adenopathy present.        Head (left side): No submental, no submandibular, no tonsillar, no preauricular, no posterior auricular and no occipital adenopathy present.   Neurological: He is alert.   Skin: Skin is warm, dry and intact. No cyanosis. Nails show no clubbing.       Assessment/Plan   Bill was seen today for irritable bowel syndrome and penis pain.    Diagnoses and all orders for this visit:    Diarrhea, unspecified type  -     Urinalysis With Microscopic If Indicated (No Culture) - Urine, Clean Catch; Future  -     Urinalysis With Microscopic If Indicated (No Culture) - Urine, Clean Catch  -     Urinalysis, Microscopic Only - Urine, Clean Catch; Future  -     Urinalysis, Microscopic Only - Urine, Clean Catch    Irritable bowel syndrome with diarrhea    Hemorrhoids, unspecified hemorrhoid type  -     hydrocortisone (ANUSOL-HC) 2.5 % rectal cream; Insert  into the rectum 2 (Two) Times a Day.    His UA is normal and does not indicate an infection. The rectal irritation and hemorrhoids are most likely the source of his irritation, will treat with Anusol cream and continue to monitor.  Reviewed recent colonoscopy which was 8/31/2017, scheduled for repeat in 3 years.

## 2019-06-03 ENCOUNTER — HOSPITAL ENCOUNTER (EMERGENCY)
Facility: HOSPITAL | Age: 49
Discharge: HOME | End: 2019-06-03
Attending: EMERGENCY MEDICINE | Admitting: EMERGENCY MEDICINE

## 2019-06-03 ENCOUNTER — APPOINTMENT (OUTPATIENT)
Dept: GENERAL RADIOLOGY | Facility: HOSPITAL | Age: 49
End: 2019-06-03

## 2019-06-03 VITALS
TEMPERATURE: 98.7 F | SYSTOLIC BLOOD PRESSURE: 129 MMHG | BODY MASS INDEX: 31.5 KG/M2 | WEIGHT: 220 LBS | HEART RATE: 74 BPM | DIASTOLIC BLOOD PRESSURE: 95 MMHG | RESPIRATION RATE: 18 BRPM | HEIGHT: 70 IN | OXYGEN SATURATION: 97 %

## 2019-06-03 DIAGNOSIS — R07.89 ATYPICAL CHEST PAIN: Primary | ICD-10-CM

## 2019-06-03 LAB
ALBUMIN SERPL-MCNC: 4.6 G/DL (ref 3.5–5.2)
ALBUMIN/GLOB SERPL: 1.9 G/DL
ALP SERPL-CCNC: 56 U/L (ref 39–117)
ALT SERPL W P-5'-P-CCNC: 33 U/L (ref 1–41)
ANION GAP SERPL CALCULATED.3IONS-SCNC: 12.5 MMOL/L
AST SERPL-CCNC: 18 U/L (ref 1–40)
BASOPHILS # BLD AUTO: 0.04 10*3/MM3 (ref 0–0.2)
BASOPHILS NFR BLD AUTO: 0.6 % (ref 0–1.5)
BILIRUB SERPL-MCNC: 0.9 MG/DL (ref 0.2–1.2)
BUN BLD-MCNC: 10 MG/DL (ref 6–20)
BUN/CREAT SERPL: 12.2 (ref 7–25)
CALCIUM SPEC-SCNC: 9.6 MG/DL (ref 8.6–10.5)
CHLORIDE SERPL-SCNC: 102 MMOL/L (ref 98–107)
CO2 SERPL-SCNC: 26.5 MMOL/L (ref 22–29)
CREAT BLD-MCNC: 0.82 MG/DL (ref 0.76–1.27)
D DIMER PPP FEU-MCNC: 0.36 MCGFEU/ML (ref 0–0.49)
DEPRECATED RDW RBC AUTO: 40.4 FL (ref 37–54)
EOSINOPHIL # BLD AUTO: 0.04 10*3/MM3 (ref 0–0.4)
EOSINOPHIL NFR BLD AUTO: 0.6 % (ref 0.3–6.2)
ERYTHROCYTE [DISTWIDTH] IN BLOOD BY AUTOMATED COUNT: 12.5 % (ref 12.3–15.4)
GFR SERPL CREATININE-BSD FRML MDRD: 99 ML/MIN/1.73
GLOBULIN UR ELPH-MCNC: 2.4 GM/DL
GLUCOSE BLD-MCNC: 119 MG/DL (ref 65–99)
HCT VFR BLD AUTO: 45.5 % (ref 37.5–51)
HGB BLD-MCNC: 15.4 G/DL (ref 13–17.7)
HOLD SPECIMEN: NORMAL
HOLD SPECIMEN: NORMAL
IMM GRANULOCYTES # BLD AUTO: 0.02 10*3/MM3 (ref 0–0.05)
IMM GRANULOCYTES NFR BLD AUTO: 0.3 % (ref 0–0.5)
LIPASE SERPL-CCNC: 28 U/L (ref 13–60)
LYMPHOCYTES # BLD AUTO: 1.7 10*3/MM3 (ref 0.7–3.1)
LYMPHOCYTES NFR BLD AUTO: 26 % (ref 19.6–45.3)
MCH RBC QN AUTO: 29.8 PG (ref 26.6–33)
MCHC RBC AUTO-ENTMCNC: 33.8 G/DL (ref 31.5–35.7)
MCV RBC AUTO: 88 FL (ref 79–97)
MONOCYTES # BLD AUTO: 0.53 10*3/MM3 (ref 0.1–0.9)
MONOCYTES NFR BLD AUTO: 8.1 % (ref 5–12)
NEUTROPHILS # BLD AUTO: 4.22 10*3/MM3 (ref 1.7–7)
NEUTROPHILS NFR BLD AUTO: 64.4 % (ref 42.7–76)
NRBC BLD AUTO-RTO: 0 /100 WBC (ref 0–0.2)
PLATELET # BLD AUTO: 231 10*3/MM3 (ref 140–450)
PMV BLD AUTO: 9.4 FL (ref 6–12)
POTASSIUM BLD-SCNC: 4.5 MMOL/L (ref 3.5–5.2)
PROT SERPL-MCNC: 7 G/DL (ref 6–8.5)
RBC # BLD AUTO: 5.17 10*6/MM3 (ref 4.14–5.8)
SODIUM BLD-SCNC: 141 MMOL/L (ref 136–145)
TROPONIN T SERPL-MCNC: <0.01 NG/ML (ref 0–0.03)
WBC NRBC COR # BLD: 6.55 10*3/MM3 (ref 3.4–10.8)
WHOLE BLOOD HOLD SPECIMEN: NORMAL
WHOLE BLOOD HOLD SPECIMEN: NORMAL

## 2019-06-03 PROCEDURE — 85025 COMPLETE CBC W/AUTO DIFF WBC: CPT

## 2019-06-03 PROCEDURE — 96374 THER/PROPH/DIAG INJ IV PUSH: CPT

## 2019-06-03 PROCEDURE — 80053 COMPREHEN METABOLIC PANEL: CPT

## 2019-06-03 PROCEDURE — 93010 ELECTROCARDIOGRAM REPORT: CPT | Performed by: INTERNAL MEDICINE

## 2019-06-03 PROCEDURE — 36415 COLL VENOUS BLD VENIPUNCTURE: CPT

## 2019-06-03 PROCEDURE — 99284 EMERGENCY DEPT VISIT MOD MDM: CPT

## 2019-06-03 PROCEDURE — 84484 ASSAY OF TROPONIN QUANT: CPT | Performed by: EMERGENCY MEDICINE

## 2019-06-03 PROCEDURE — 84484 ASSAY OF TROPONIN QUANT: CPT

## 2019-06-03 PROCEDURE — 93005 ELECTROCARDIOGRAM TRACING: CPT | Performed by: EMERGENCY MEDICINE

## 2019-06-03 PROCEDURE — 83690 ASSAY OF LIPASE: CPT | Performed by: EMERGENCY MEDICINE

## 2019-06-03 PROCEDURE — 93005 ELECTROCARDIOGRAM TRACING: CPT

## 2019-06-03 PROCEDURE — 85379 FIBRIN DEGRADATION QUANT: CPT | Performed by: EMERGENCY MEDICINE

## 2019-06-03 PROCEDURE — 71046 X-RAY EXAM CHEST 2 VIEWS: CPT

## 2019-06-03 RX ORDER — OMEPRAZOLE 20 MG/1
20 CAPSULE, DELAYED RELEASE ORAL DAILY
Qty: 30 CAPSULE | Refills: 0 | Status: SHIPPED | OUTPATIENT
Start: 2019-06-03 | End: 2019-06-25 | Stop reason: SDUPTHER

## 2019-06-03 RX ORDER — FAMOTIDINE 10 MG/ML
20 INJECTION, SOLUTION INTRAVENOUS ONCE
Status: COMPLETED | OUTPATIENT
Start: 2019-06-03 | End: 2019-06-03

## 2019-06-03 RX ORDER — SODIUM CHLORIDE 0.9 % (FLUSH) 0.9 %
10 SYRINGE (ML) INJECTION AS NEEDED
Status: DISCONTINUED | OUTPATIENT
Start: 2019-06-03 | End: 2019-06-03 | Stop reason: HOSPADM

## 2019-06-03 RX ORDER — ASPIRIN 325 MG
325 TABLET ORAL ONCE
Status: COMPLETED | OUTPATIENT
Start: 2019-06-03 | End: 2019-06-03

## 2019-06-03 RX ORDER — ALUMINA, MAGNESIA, AND SIMETHICONE 2400; 2400; 240 MG/30ML; MG/30ML; MG/30ML
15 SUSPENSION ORAL ONCE
Status: COMPLETED | OUTPATIENT
Start: 2019-06-03 | End: 2019-06-03

## 2019-06-03 RX ORDER — OMEPRAZOLE 20 MG/1
20 CAPSULE, DELAYED RELEASE ORAL DAILY
COMMUNITY
End: 2019-06-25

## 2019-06-03 RX ADMIN — ALUMINUM HYDROXIDE, MAGNESIUM HYDROXIDE, AND DIMETHICONE 15 ML: 400; 400; 40 SUSPENSION ORAL at 08:18

## 2019-06-03 RX ADMIN — FAMOTIDINE 20 MG: 10 INJECTION INTRAVENOUS at 08:19

## 2019-06-03 RX ADMIN — LIDOCAINE HYDROCHLORIDE 15 ML: 20 SOLUTION ORAL; TOPICAL at 08:18

## 2019-06-03 RX ADMIN — ASPIRIN 325 MG: 325 TABLET ORAL at 07:59

## 2019-06-03 NOTE — ED TRIAGE NOTES
Pt reports he has not felt well for several weeks.   Pt reports aching in his chest and between his shoulder blades. Pt reports nausea as well as abdominal pain (epigastric / LUQ area)

## 2019-06-03 NOTE — ED PROVIDER NOTES
" EMERGENCY DEPARTMENT ENCOUNTER    CHIEF COMPLAINT  Chief Complaint: Chest \"burning\"  History given by: patient  History limited by: Gucci Historian  Room Number: 01/01  PMD: Geraldine Long APRN      HPI:  Pt is a 49 y.o. male who presents complaining of intermittent chest \"burning ache\" that started a couple weeks ago. Pt notes that he gets the feeling 4-5x a day and they are very brief but sometimes last hours in duration. Pt admits to back pain, upper abd pain, nausea, fatigue, and palpitations. Pt denies vomiting, SOA, cough, and fever. Pt states that he feels like food triggers the episodes but he is unsure. Pt notes that he has taken Prilosec sporadically over the past couple of week with no relief but he has take Tums with some relief. Pt states that he was in the ED before for similar sx and was diagnosed with GERD and that he has had a stress test in the past 10 years. Pt reports that he takes Atenolol for the palpitations.  She denies any exertional pain, pain with deep breathing, shortness of breath, or vomiting    Duration:  Couple of weeks  Onset: gradual  Timing: intermittent   Location: chest  Radiation: none  Quality: \"burning ache\"  Intensity/Severity: mild  Progression: unchanged  Associated Symptoms: back pain, upper abd pain, nausea, fatigue, and palpitations  Aggravating Factors: Pt states that he feels like food triggers the episodes but he is unsure.  Alleviating Factors: Tums   Previous Episodes: Pt states that he was in the ED before for similar sx and that he has had a stress test in the past 10 years.  Treatment before arrival: Pt notes that he has taken Prilosec with no relief but he has take Tums with some relief. Pt states that he was in the ED before for similar sx and that he has had a stress test in the past 10 years. Pt reports that he takes Atenolol for the palpitations.      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   • B12 deficiency 03/21/2017   • Vitamin B12 deficiency due to intestinal malabsorption 03/21/2017   • Heme + stool 08/10/2017   • Family history of polyps in the colon 08/10/2017   • Carcinoid tumor 08/10/2017   • Diverticulitis of large intestine without perforation or abscess with bleeding 08/10/2017     Resolved Ambulatory Problems     Diagnosis Date Noted   • Urinary frequency 03/29/2016     Past Medical History:   Diagnosis Date   • Anxiety    • Blood in stool    • Cancer (CMS/HCC)    • Colon polyp    • Diarrhea    • Dizziness    • Heart palpitations    • History of kidney stones    • Hypercholesterolemia    • Hypertension    • Nephrolithiasis        PAST SURGICAL HISTORY  Past Surgical History:   Procedure Laterality Date   • COLON RESECTION Right 8/29/2016    Procedure: COLON RESECTION RIGHT LAPAROSCOPIC;  Surgeon: Baron Bedolla MD;  Location: [facility] MAIN OR;  Service:    • COLONOSCOPY  05/21/2009    ih, tics    • COLONOSCOPY N/A 8/16/2016    Procedure: COLONOSCOPYWITH BIOPSIES AND COLD POLYPECTOMY TIMES 1 ;  Surgeon: Will Vo MD;  Location: [facility] ENDOSCOPY;  Service:    • COLONOSCOPY N/A 8/31/2017    Procedure: COLONOSCOPY TO FRANCIS TERMINAL ILEUM ;  Surgeon: Will Vo MD;  Location: [facility] ENDOSCOPY;  Service:    • TONSILLECTOMY      CHILDHOOD       FAMILY HISTORY  Family History   Problem Relation Age of Onset   • Memory loss Mother    • Heart disease Father    • Other Father         CABG   • Colon polyps Father    • Malig Hyperthermia Neg Hx        SOCIAL HISTORY  Social History     Socioeconomic History   • Marital status:      Spouse name: Jihan   • Number of children: Not on file   • Years of education: College   • Highest education level: Not on file   Occupational History   • Occupation:      Employer: [email or web address]   Tobacco Use   • Smoking  "status: Never Smoker   • Smokeless tobacco: Never Used   Substance and Sexual Activity   • Alcohol use: Yes     Comment: Socially   • Drug use: No   • Sexual activity: Defer       ALLERGIES  Penicillins    REVIEW OF SYSTEMS  Review of Systems   Constitutional: Positive for fatigue. Negative for activity change, appetite change and fever.   HENT: Negative for congestion and sore throat.    Eyes: Negative.    Respiratory: Negative for cough and shortness of breath.    Cardiovascular: Positive for chest pain (\"burning\") and palpitations. Negative for leg swelling.   Gastrointestinal: Positive for abdominal pain (upper) and nausea. Negative for diarrhea and vomiting.   Endocrine: Negative.    Genitourinary: Negative for decreased urine volume and dysuria.   Musculoskeletal: Positive for back pain. Negative for neck pain.   Skin: Negative for rash and wound.   Allergic/Immunologic: Negative.    Neurological: Negative for weakness, numbness and headaches.   Hematological: Negative.    Psychiatric/Behavioral: Negative.    All other systems reviewed and are negative.      PHYSICAL EXAM  ED Triage Vitals   Temp Heart Rate Resp BP SpO2   06/03/19 0700 06/03/19 0700 06/03/19 0700 06/03/19 0701 06/03/19 0700   98.7 °F (37.1 °C) 88 16 142/100 98 %      Temp src Heart Rate Source Patient Position BP Location FiO2 (%)   06/03/19 0700 -- 06/03/19 0701 06/03/19 0701 --   Tympanic  Lying Right arm        Physical Exam   Constitutional: He is oriented to person, place, and time. No distress.   HENT:   Head: Normocephalic and atraumatic.   Eyes: EOM are normal. Pupils are equal, round, and reactive to light.   Neck: Normal range of motion. Neck supple.   Cardiovascular: Normal rate, regular rhythm and normal heart sounds.   Pulmonary/Chest: Effort normal and breath sounds normal. No respiratory distress. He exhibits tenderness (lower chest).   Abdominal: Soft. There is tenderness (mid) in the epigastric area. There is no rebound, no " guarding, no CVA tenderness and negative Curtis's sign.   Musculoskeletal: Normal range of motion. He exhibits no edema.   Neurological: He is alert and oriented to person, place, and time. He has normal sensation and normal strength.   Skin: Skin is warm and dry.   Psychiatric: Mood and affect normal.   Nursing note and vitals reviewed.      LAB RESULTS  Lab Results (last 24 hours)     Procedure Component Value Units Date/Time    CBC & Differential [[id number]] Collected:  06/03/19 0718    Specimen:  Blood Updated:  06/03/19 0731    Narrative:       The following orders were created for panel order CBC & Differential.  Procedure                               Abnormality         Status                     ---------                               -----------         ------                     CBC Auto Differential[[id number]]        Normal              Final result                 Please view results for these tests on the individual orders.    Comprehensive Metabolic Panel [[id number]]  (Abnormal) Collected:  06/03/19 0718    Specimen:  Blood Updated:  06/03/19 0749     Glucose 119 mg/dL      BUN 10 mg/dL      Creatinine 0.82 mg/dL      Sodium 141 mmol/L      Potassium 4.5 mmol/L      Chloride 102 mmol/L      CO2 26.5 mmol/L      Calcium 9.6 mg/dL      Total Protein 7.0 g/dL      Albumin 4.60 g/dL      ALT (SGPT) 33 U/L      AST (SGOT) 18 U/L      Alkaline Phosphatase 56 U/L      Total Bilirubin 0.9 mg/dL      eGFR Non African Amer 99 mL/min/1.73      Globulin 2.4 gm/dL      A/G Ratio 1.9 g/dL      BUN/Creatinine Ratio 12.2     Anion Gap 12.5 mmol/L     Narrative:       GFR Normal >60  Chronic Kidney Disease <60  Kidney Failure <15    Troponin [[id number]]  (Normal) Collected:  06/03/19 0718    Specimen:  Blood Updated:  06/03/19 0751     Troponin T <0.010 ng/mL     Narrative:       Troponin T Reference Range:  <= 0.03 ng/mL-   Negative for AMI  >0.03 ng/mL-     Abnormal for myocardial necrosis.  Clinicians would have to  utilize clinical acumen, EKG, Troponin and serial changes to determine if it is an Acute Myocardial Infarction or myocardial injury due to an underlying chronic condition.     CBC Auto Differential [[id number]]  (Normal) Collected:  06/03/19 0718    Specimen:  Blood Updated:  06/03/19 0731     WBC 6.55 10*3/mm3      RBC 5.17 10*6/mm3      Hemoglobin 15.4 g/dL      Hematocrit 45.5 %      MCV 88.0 fL      MCH 29.8 pg      MCHC 33.8 g/dL      RDW 12.5 %      RDW-SD 40.4 fl      MPV 9.4 fL      Platelets 231 10*3/mm3      Neutrophil % 64.4 %      Lymphocyte % 26.0 %      Monocyte % 8.1 %      Eosinophil % 0.6 %      Basophil % 0.6 %      Immature Grans % 0.3 %      Neutrophils, Absolute 4.22 10*3/mm3      Lymphocytes, Absolute 1.70 10*3/mm3      Monocytes, Absolute 0.53 10*3/mm3      Eosinophils, Absolute 0.04 10*3/mm3      Basophils, Absolute 0.04 10*3/mm3      Immature Grans, Absolute 0.02 10*3/mm3      nRBC 0.0 /100 WBC     D-dimer, Quantitative [[id number]]  (Normal) Collected:  06/03/19 0718    Specimen:  Blood Updated:  06/03/19 0835     D-Dimer, Quantitative 0.36 MCGFEU/mL     Narrative:       The Stago D-Dimer test used in conjunction with a clinical pretest probability (PTP) assessment model, has been approved by the FDA to rule out the presence of venous thromboembolism (VTE) in outpatients suspected of deep venous thrombosis (DVT) or pulmonary embolism (PE). The cut-off for negative predictive value is <0.50 MCGFEU/mL.    Lipase [[id number]]  (Normal) Collected:  06/03/19 0718    Specimen:  Blood Updated:  06/03/19 0829     Lipase 28 U/L     Troponin [[id number]]  (Normal) Collected:  06/03/19 0919    Specimen:  Blood Updated:  06/03/19 0950     Troponin T <0.010 ng/mL     Narrative:       Troponin T Reference Range:  <= 0.03 ng/mL-   Negative for AMI  >0.03 ng/mL-     Abnormal for myocardial necrosis.  Clinicians would have to utilize clinical acumen, EKG, Troponin and serial changes to determine if it is an  Acute Myocardial Infarction or myocardial injury due to an underlying chronic condition.     Troponin [[id number]]  (Normal) Collected:  06/03/19 1020    Specimen:  Blood Updated:  06/03/19 1052     Troponin T <0.010 ng/mL     Narrative:       Troponin T Reference Range:  <= 0.03 ng/mL-   Negative for AMI  >0.03 ng/mL-     Abnormal for myocardial necrosis.  Clinicians would have to utilize clinical acumen, EKG, Troponin and serial changes to determine if it is an Acute Myocardial Infarction or myocardial injury due to an underlying chronic condition.           I ordered the above labs and reviewed the results    RADIOLOGY  XR Chest 2 View   Final Result   No acute disease.       This report was finalized on 6/3/2019 9:05 AM by Dr. Erasmo Rosales M.D.               I ordered the above noted radiological studies. Interpreted by radiologist. Reviewed by me in PACS.       PROCEDURES  Procedures  EKG          EKG time: 0706  Rhythm/Rate: SR rate 75  QRS, axis: narrow complex, nml axis   ST and T waves: nonspecific ST and T wave changes, nml QT     Interpreted Contemporaneously by me, independently viewed and unchanged compared to prior August 2016.      PROGRESS AND CONSULTS  ED Course as of Jun 03 1637   Mon Jun 03, 2019   1145 11:45 AM  Patient currently is asymptomatic without any pain or discomfort.  Uncertain if if the medicine that I gave him helped resolve his symptoms.  But currently is pain-free.I have discussed at length with the patient and spouse the results of the tests.  I have a low index of suspicion that anything is serious occurring.  The symptoms are atypical for coronary artery disease, pulmonary embolism, or thoracic aortic dissection.  The patient understands the limitations of testing and understands that we are unable to rule out a disease process 100%.  The patient feels comfortable and agrees in being discharged home.  The patient will will return if symptoms worsen, develop shortness of  breath, weakness in extremities, or any concerns.  They will also follow up with cardiology as provided.    [MM]      ED Course User Index  [MM] Solomon Combs MD     0812- Rechecked pt who is resting comfortably in NAD. Informed pt of the lab and imaging results. D/w pt the plan to repeat Troponin levels. Pt understands and agrees with plan. All questions answered.      1012- Rechecked pt who is resting comfortably in NAD.    1132- Rechecked pt who is resting comfortably in NAD. Pt states that he is feeling better. D/w pt the plan to DC with instructions to f/u with cardiology. D/w pt to RTER if the sx worsen. Pt understands and agrees with plan. All questions answered.      Patient's heart score is 2 and is had 3- troponins in the emergency department.        MEDICAL DECISION MAKING  Results were reviewed/discussed with the patient and they were also made aware of online access. Pt also made aware that some labs, such as cultures, will not be resulted during ER visit and follow up with PMD is necessary.     MDM  Number of Diagnoses or Management Options     Amount and/or Complexity of Data Reviewed  Clinical lab tests: ordered and reviewed (Unremarkable)  Tests in the radiology section of CPT®: ordered and reviewed (CXR: negative acute)  Tests in the medicine section of CPT®: ordered and reviewed (See ESCOBAR note)  Decide to obtain previous medical records or to obtain history from someone other than the patient: yes  Review and summarize past medical records: yes (On 9/24/18 the pt saw Dr. Buenrostro and the note is as followed:  1.  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  Patient has no evidence of distant metastatic disease on recent scans now 2 years out from initial diagnosis.  The serum chromogranin A level is normal  2.  Resection of the terminal ileum putting him at risk for future B12 deficiency.  Plan  1.  I discussed the results of the  labs and CT scans from one week ago with the patient.    2.  Continue B12 tablets as maintenance 1000 µg by mouth daily.   3.  He otherwise will return for M.D. follow-up in 12 months and again will have labs and a CT scan performed 1 week prior to visit.  4.  He will follow-up with Dr. Vo for further surveillance colonoscopies.)           DIAGNOSIS  Final diagnoses:   Atypical chest pain       DISPOSITION  DISCHARGE    Patient discharged in stable condition.    Reviewed implications of results, diagnosis, meds, responsibility to follow up, warning signs and symptoms of possible worsening, potential complications and reasons to return to ER.    Patient/Family voiced understanding of above instructions.    Discussed plan for discharge, as there is no emergent indication for admission. Patient referred to primary care provider for BP management due to today's BP. Pt/family is agreeable and understands need for follow up and repeat testing.  Pt is aware that discharge does not mean that nothing is wrong but it indicates no emergency is present that requires admission and they must continue care with follow-up as given below or physician of their choice.     FOLLOW-UP  Omid Jc MD  [address] [address]  [phone]      Call tomorrow morning or when you get home to arrange follow-up in the next couple of days., Return if pain worsens, If symptoms worsen, shortness of breath, fever, any concerns         Medication List      Changed    * omeprazole 20 MG capsule  Commonly known as:  priLOSEC  What changed:  Another medication with the same name was added. Make sure   you understand how and when to take each.     * omeprazole 20 MG capsule  Commonly known as:  priLOSEC  Take 1 capsule by mouth Daily for 30 doses.  What changed:  You were already taking a medication with the same name,   and this prescription was added. Make sure you understand how and when to   take each.         *  This list has 2 medication(s) that are the same as other medications   prescribed for you. Read the directions carefully, and ask your doctor or   other care provider to review them with you.                  Latest Documented Vital Signs:  As of 4:37 PM  BP- 129/95 HR- 74 Temp- 98.7 °F (37.1 °C) (Tympanic) O2 sat- 97%    --  Documentation assistance provided by ralph Rashid for Dr. Combs.  Information recorded by the scribe was done at my direction and has been verified and validated by me.       Yisel Rashid  06/03/19 1150       Solomon Cobms MD  06/03/19 2348

## 2019-06-21 ENCOUNTER — APPOINTMENT (OUTPATIENT)
Dept: GENERAL RADIOLOGY | Facility: HOSPITAL | Age: 49
End: 2019-06-21

## 2019-06-21 ENCOUNTER — HOSPITAL ENCOUNTER (EMERGENCY)
Facility: HOSPITAL | Age: 49
Discharge: HOME | End: 2019-06-21
Attending: EMERGENCY MEDICINE | Admitting: EMERGENCY MEDICINE

## 2019-06-21 VITALS
BODY MASS INDEX: 30.64 KG/M2 | WEIGHT: 214 LBS | HEIGHT: 70 IN | HEART RATE: 55 BPM | DIASTOLIC BLOOD PRESSURE: 77 MMHG | TEMPERATURE: 98.1 F | OXYGEN SATURATION: 97 % | RESPIRATION RATE: 16 BRPM | SYSTOLIC BLOOD PRESSURE: 117 MMHG

## 2019-06-21 DIAGNOSIS — R07.9 CHEST PAIN, UNSPECIFIED TYPE: Primary | ICD-10-CM

## 2019-06-21 DIAGNOSIS — F41.9 ANXIETY: ICD-10-CM

## 2019-06-21 LAB
ALBUMIN SERPL-MCNC: 4.6 G/DL (ref 3.5–5.2)
ALBUMIN/GLOB SERPL: 1.7 G/DL
ALP SERPL-CCNC: 55 U/L (ref 39–117)
ALT SERPL W P-5'-P-CCNC: 44 U/L (ref 1–41)
ANION GAP SERPL CALCULATED.3IONS-SCNC: 11.3 MMOL/L
AST SERPL-CCNC: 20 U/L (ref 1–40)
BASOPHILS # BLD AUTO: 0.02 10*3/MM3 (ref 0–0.2)
BASOPHILS NFR BLD AUTO: 0.3 % (ref 0–1.5)
BILIRUB SERPL-MCNC: 1.1 MG/DL (ref 0.2–1.2)
BUN BLD-MCNC: 9 MG/DL (ref 6–20)
BUN/CREAT SERPL: 10.7 (ref 7–25)
CALCIUM SPEC-SCNC: 10.1 MG/DL (ref 8.6–10.5)
CHLORIDE SERPL-SCNC: 96 MMOL/L (ref 98–107)
CO2 SERPL-SCNC: 27.7 MMOL/L (ref 22–29)
CREAT BLD-MCNC: 0.84 MG/DL (ref 0.76–1.27)
DEPRECATED RDW RBC AUTO: 41.1 FL (ref 37–54)
EOSINOPHIL # BLD AUTO: 0.02 10*3/MM3 (ref 0–0.4)
EOSINOPHIL NFR BLD AUTO: 0.3 % (ref 0.3–6.2)
ERYTHROCYTE [DISTWIDTH] IN BLOOD BY AUTOMATED COUNT: 12.6 % (ref 12.3–15.4)
GFR SERPL CREATININE-BSD FRML MDRD: 97 ML/MIN/1.73
GLOBULIN UR ELPH-MCNC: 2.7 GM/DL
GLUCOSE BLD-MCNC: 117 MG/DL (ref 65–99)
HCT VFR BLD AUTO: 45.3 % (ref 37.5–51)
HGB BLD-MCNC: 15.4 G/DL (ref 13–17.7)
HOLD SPECIMEN: NORMAL
HOLD SPECIMEN: NORMAL
IMM GRANULOCYTES # BLD AUTO: 0.02 10*3/MM3 (ref 0–0.05)
IMM GRANULOCYTES NFR BLD AUTO: 0.3 % (ref 0–0.5)
LYMPHOCYTES # BLD AUTO: 1.53 10*3/MM3 (ref 0.7–3.1)
LYMPHOCYTES NFR BLD AUTO: 22.5 % (ref 19.6–45.3)
MCH RBC QN AUTO: 30.2 PG (ref 26.6–33)
MCHC RBC AUTO-ENTMCNC: 34 G/DL (ref 31.5–35.7)
MCV RBC AUTO: 88.8 FL (ref 79–97)
MONOCYTES # BLD AUTO: 0.48 10*3/MM3 (ref 0.1–0.9)
MONOCYTES NFR BLD AUTO: 7.1 % (ref 5–12)
NEUTROPHILS # BLD AUTO: 4.72 10*3/MM3 (ref 1.7–7)
NEUTROPHILS NFR BLD AUTO: 69.5 % (ref 42.7–76)
NRBC BLD AUTO-RTO: 0 /100 WBC (ref 0–0.2)
PLATELET # BLD AUTO: 230 10*3/MM3 (ref 140–450)
PMV BLD AUTO: 9.3 FL (ref 6–12)
POTASSIUM BLD-SCNC: 4.3 MMOL/L (ref 3.5–5.2)
PROT SERPL-MCNC: 7.3 G/DL (ref 6–8.5)
RBC # BLD AUTO: 5.1 10*6/MM3 (ref 4.14–5.8)
SODIUM BLD-SCNC: 135 MMOL/L (ref 136–145)
TROPONIN T SERPL-MCNC: <0.01 NG/ML (ref 0–0.03)
TROPONIN T SERPL-MCNC: <0.01 NG/ML (ref 0–0.03)
WBC NRBC COR # BLD: 6.79 10*3/MM3 (ref 3.4–10.8)
WHOLE BLOOD HOLD SPECIMEN: NORMAL
WHOLE BLOOD HOLD SPECIMEN: NORMAL

## 2019-06-21 PROCEDURE — 85025 COMPLETE CBC W/AUTO DIFF WBC: CPT | Performed by: EMERGENCY MEDICINE

## 2019-06-21 PROCEDURE — 93005 ELECTROCARDIOGRAM TRACING: CPT | Performed by: EMERGENCY MEDICINE

## 2019-06-21 PROCEDURE — 93005 ELECTROCARDIOGRAM TRACING: CPT

## 2019-06-21 PROCEDURE — 71046 X-RAY EXAM CHEST 2 VIEWS: CPT

## 2019-06-21 PROCEDURE — 80053 COMPREHEN METABOLIC PANEL: CPT | Performed by: EMERGENCY MEDICINE

## 2019-06-21 PROCEDURE — 99284 EMERGENCY DEPT VISIT MOD MDM: CPT

## 2019-06-21 PROCEDURE — 93010 ELECTROCARDIOGRAM REPORT: CPT | Performed by: INTERNAL MEDICINE

## 2019-06-21 PROCEDURE — 84484 ASSAY OF TROPONIN QUANT: CPT | Performed by: EMERGENCY MEDICINE

## 2019-06-21 RX ORDER — ALUMINA, MAGNESIA, AND SIMETHICONE 2400; 2400; 240 MG/30ML; MG/30ML; MG/30ML
15 SUSPENSION ORAL ONCE
Status: COMPLETED | OUTPATIENT
Start: 2019-06-21 | End: 2019-06-21

## 2019-06-21 RX ORDER — SODIUM CHLORIDE 0.9 % (FLUSH) 0.9 %
10 SYRINGE (ML) INJECTION AS NEEDED
Status: DISCONTINUED | OUTPATIENT
Start: 2019-06-21 | End: 2019-06-21 | Stop reason: HOSPADM

## 2019-06-21 RX ORDER — ASPIRIN 325 MG
325 TABLET ORAL ONCE
Status: COMPLETED | OUTPATIENT
Start: 2019-06-21 | End: 2019-06-21

## 2019-06-21 RX ADMIN — ALUMINUM HYDROXIDE, MAGNESIUM HYDROXIDE, AND DIMETHICONE 15 ML: 400; 400; 40 SUSPENSION ORAL at 09:19

## 2019-06-21 RX ADMIN — ASPIRIN 325 MG: 325 TABLET ORAL at 09:19

## 2019-06-21 RX ADMIN — LIDOCAINE HYDROCHLORIDE 15 ML: 20 SOLUTION ORAL; TOPICAL at 09:19

## 2019-06-25 ENCOUNTER — OFFICE VISIT (OUTPATIENT)
Dept: INTERNAL MEDICINE | Facility: CLINIC | Age: 49
End: 2019-06-25

## 2019-06-25 VITALS
BODY MASS INDEX: 30.49 KG/M2 | SYSTOLIC BLOOD PRESSURE: 142 MMHG | HEIGHT: 70 IN | HEART RATE: 63 BPM | DIASTOLIC BLOOD PRESSURE: 80 MMHG | OXYGEN SATURATION: 98 % | WEIGHT: 213 LBS

## 2019-06-25 DIAGNOSIS — R10.11 RIGHT UPPER QUADRANT PAIN: ICD-10-CM

## 2019-06-25 DIAGNOSIS — R10.13 EPIGASTRIC PAIN: Primary | ICD-10-CM

## 2019-06-25 DIAGNOSIS — R19.7 DIARRHEA, UNSPECIFIED TYPE: ICD-10-CM

## 2019-06-25 DIAGNOSIS — R07.89 ATYPICAL CHEST PAIN: ICD-10-CM

## 2019-06-25 PROCEDURE — 99214 OFFICE O/P EST MOD 30 MIN: CPT | Performed by: NURSE PRACTITIONER

## 2019-06-25 RX ORDER — PANTOPRAZOLE SODIUM 40 MG/1
40 TABLET, DELAYED RELEASE ORAL DAILY
Qty: 30 TABLET | Refills: 5 | Status: SHIPPED | OUTPATIENT
Start: 2019-06-25 | End: 2019-07-15 | Stop reason: SDUPTHER

## 2019-06-26 NOTE — PROGRESS NOTES
Subjective   Bill Cano is a 49 y.o. male who presents for f/u regarding recent ER visit and abdominal pain.    He was seen 5/2019 for loose stools which have since resolved. However, he now c/o intermittent episodes of epigastric and anterior chest wall pain radiating into his back. He also c/o dyspepsia despite taking Omeprazole daily, worse with eating (c/o worsening sx after barbecue sauce).  He has seen Dr. Vo in the past and there was discussion regarding gallbladder disease. His HIDA scan was borderline at that time. However, his repeat HIDA 10/28/2010 showed an ejection fraction of 51%. His CT abd/pelvis 9/2018 did not show any acute abnl.   He has been to the ER in the past couple of weeks due to chest pain, has appt with cardiology this week.      Abdominal Pain   This is a recurrent problem. The current episode started 1 to 4 weeks ago. The problem occurs daily. The problem has been waxing and waning. The pain is located in the epigastric region and left flank. The abdominal pain radiates to the chest and perineum. Associated symptoms include belching, diarrhea, flatus, headaches, nausea and weight loss. Pertinent negatives include no arthralgias, constipation, dysuria, fever, frequency, hematochezia, hematuria, melena, myalgias or vomiting. The pain is aggravated by eating and NSAIDs. The pain is relieved by eating.        The following portions of the patient's history were reviewed and updated as appropriate: allergies, current medications, past social history and problem list.    Past Medical History:   Diagnosis Date   • Anxiety    • Blood in stool    • Cancer (CMS/HCC)     Terminal ileum   • Colon polyp    • Diarrhea    • Dizziness    • Heart palpitations     Patient takes Atenolol   • History of kidney stones    • Hypercholesterolemia    • Hypertension    • Nephrolithiasis          Current Outpatient Medications:   •  atenolol (TENORMIN) 25 MG tablet, Take 1/2 tablet every evening., Disp:  "45 tablet, Rfl: 3  •  ezetimibe-simvastatin (VYTORIN) 10-40 MG per tablet, Take 1 tablet by mouth Daily., Disp: 90 tablet, Rfl: 3  •  ibuprofen (ADVIL,MOTRIN) 400 MG tablet, Take 400 mg by mouth as needed for mild pain (1-3)., Disp: , Rfl:   •  vitamin B-12 (CYANOCOBALAMIN) 1000 MCG tablet, Take 1 tablet by mouth Daily., Disp: 90 tablet, Rfl: 3  •  pantoprazole (PROTONIX) 40 MG EC tablet, Take 1 tablet by mouth Daily., Disp: 30 tablet, Rfl: 5    Allergies   Allergen Reactions   • Penicillins      Patient unsure had as a child       Review of Systems   Constitutional: Positive for weight loss. Negative for chills, fatigue, fever and unexpected weight change.   HENT: Negative for congestion, ear pain, postnasal drip, sinus pressure, sore throat and trouble swallowing.    Eyes: Negative for visual disturbance.   Respiratory: Negative for cough, chest tightness and wheezing.    Cardiovascular: Positive for chest pain. Negative for palpitations and leg swelling.   Gastrointestinal: Positive for abdominal pain, diarrhea, flatus and nausea. Negative for blood in stool, constipation, hematochezia, melena and vomiting.   Genitourinary: Negative for dysuria, frequency, hematuria and urgency.   Musculoskeletal: Positive for back pain. Negative for arthralgias, joint swelling and myalgias.   Skin: Negative for color change.   Neurological: Positive for headaches. Negative for syncope and weakness.   Hematological: Does not bruise/bleed easily.       Objective   Vitals:    06/25/19 1339   BP: 142/80   BP Location: Left arm   Patient Position: Sitting   Cuff Size: Adult   Pulse: 63   SpO2: 98%   Weight: 96.6 kg (213 lb)   Height: 177.8 cm (70\")     Physical Exam   Constitutional: He appears well-developed and well-nourished. He is cooperative. He does not have a sickly appearance. He does not appear ill.   HENT:   Head: Normocephalic.   Right Ear: Hearing, tympanic membrane and external ear normal.   Left Ear: Hearing, tympanic " membrane and external ear normal.   Nose: Nose normal. No mucosal edema, rhinorrhea, sinus tenderness or nasal deformity. Right sinus exhibits no maxillary sinus tenderness and no frontal sinus tenderness. Left sinus exhibits no maxillary sinus tenderness and no frontal sinus tenderness.   Mouth/Throat: Oropharynx is clear and moist and mucous membranes are normal. Normal dentition.   Eyes: Conjunctivae and lids are normal. Right eye exhibits no discharge and no exudate. Left eye exhibits no discharge and no exudate.   Neck: Trachea normal. Carotid bruit is not present. No edema present. No thyroid mass present.   Cardiovascular: Regular rhythm, normal heart sounds and normal pulses.   No murmur heard.  Pulmonary/Chest: Breath sounds normal. No respiratory distress. He has no decreased breath sounds. He has no wheezes. He has no rhonchi. He has no rales.   Abdominal: Soft. There is no tenderness.   Lymphadenopathy:        Head (right side): No submental, no submandibular, no tonsillar, no preauricular, no posterior auricular and no occipital adenopathy present.        Head (left side): No submental, no submandibular, no tonsillar, no preauricular, no posterior auricular and no occipital adenopathy present.   Neurological: He is alert.   Skin: Skin is warm, dry and intact. No cyanosis. Nails show no clubbing.       Assessment/Plan   Bill was seen today for follow-up.    Diagnoses and all orders for this visit:    Epigastric pain  -     Ambulatory Referral to Gastroenterology  -     NM HIDA Scan With Pharmacological Intervention; Future  -     pantoprazole (PROTONIX) 40 MG EC tablet; Take 1 tablet by mouth Daily.    Right upper quadrant pain  -     NM HIDA Scan With Pharmacological Intervention; Future    Diarrhea, unspecified type  -     NM HIDA Scan With Pharmacological Intervention; Future    Atypical chest pain    Reviewed hospital notes and previous HIDA scan, I believe his chest pain is more GI-related. His CT  abd/pelvis 9/2018 did not show any acute abnl. I would like to repeat his HIDA scan and start daily Protonix. He may also need an EGD by Dr. Vo if sx persist and HIDA scan is normal.  He will see cardiology on Thursday.

## 2019-06-27 ENCOUNTER — OFFICE VISIT (OUTPATIENT)
Dept: CARDIOLOGY | Facility: CLINIC | Age: 49
End: 2019-06-27

## 2019-06-27 VITALS
SYSTOLIC BLOOD PRESSURE: 160 MMHG | DIASTOLIC BLOOD PRESSURE: 92 MMHG | OXYGEN SATURATION: 98 % | BODY MASS INDEX: 30.75 KG/M2 | HEIGHT: 70 IN | HEART RATE: 66 BPM | WEIGHT: 214.8 LBS

## 2019-06-27 DIAGNOSIS — R07.2 PRECORDIAL PAIN: Primary | ICD-10-CM

## 2019-06-27 DIAGNOSIS — Z82.49 FAMILY HISTORY OF CORONARY ARTERY DISEASE: ICD-10-CM

## 2019-06-27 PROCEDURE — 99204 OFFICE O/P NEW MOD 45 MIN: CPT | Performed by: INTERNAL MEDICINE

## 2019-06-27 NOTE — PROGRESS NOTES
"Date of Office Visit: 2019  Encounter Provider: Omid Jc MD  Place of Service: [facility] CARDIOLOGY  Patient Name: Bill Cano  :1970    Chief complaint: Chest pain.    History of Present Illness:    I had the pleasure of seeing the patient in cardiology office on 2019.  He is a very pleasant 49 year-old white male with a history of prior carcinoid tumor and hyperlipidemia who presents for evaluation.  The patient has had several months of symptoms.  He describes several episodes of a \"knot\" feeling in the center of his chest which radiates into the back at times.  When this occurs, he also has shooting pains up the precordium.  It occurs randomly, and actually does have a correlation after eating.  He has been to the emergency department twice, once on 6/3/2019, and the other time on 2019.  During both occasions, his work-up was relatively unrevealing, and his EKG was normal.  During the second ER visit, he did state that the discomfort went into his left arm and neck as well.  He did tell me that Tums and Pepto-Bismol help.  He has also been nauseated for several months intermittently as well.  He started on Protonix 2 days ago, and states that he feels slightly better.  He follows with Dr. 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  He is actually scheduled to see him tomorrow.  He tells me that he has white coat syndrome, and his blood pressure is always elevated in a physician's office.  However, he has kept a blood pressure log at home, and it looks excellent with systolic readings in the 110-120 range, and diastolic readings mainly in the 70s.  He does have a family history of coronary artery disease with his father having a CABG in his mid 60s.    Past Medical History:   Diagnosis Date   • Anxiety    • Blood in stool    • Cancer (CMS/HCC)     Terminal ileum (carcinoid tumor)   • Colon polyp "    • Diarrhea    • Dizziness    • Heart palpitations     Patient takes Atenolol   • History of kidney stones    • Hypercholesterolemia    • Hypertension    • Nephrolithiasis        Past Surgical History:   Procedure Laterality Date   • APPENDECTOMY     • COLON RESECTION Right 8/29/2016    Procedure: COLON RESECTION RIGHT LAPAROSCOPIC;  Surgeon: Baron Bedolla MD;  Location: [facility] MAIN OR;  Service:    • COLONOSCOPY  05/21/2009    ih, tics    • COLONOSCOPY N/A 8/16/2016    Procedure: COLONOSCOPYWITH BIOPSIES AND COLD POLYPECTOMY TIMES 1 ;  Surgeon: Will Vo MD;  Location: [facility] ENDOSCOPY;  Service:    • COLONOSCOPY N/A 8/31/2017    Procedure: COLONOSCOPY TO FRANCIS TERMINAL ILEUM ;  Surgeon: Will Vo MD;  Location: [facility] ENDOSCOPY;  Service:    • TONSILLECTOMY      CHILDHOOD       Current Outpatient Medications on File Prior to Visit   Medication Sig Dispense Refill   • atenolol (TENORMIN) 25 MG tablet Take 1/2 tablet every evening. 45 tablet 3   • ezetimibe-simvastatin (VYTORIN) 10-40 MG per tablet Take 1 tablet by mouth Daily. 90 tablet 3   • ibuprofen (ADVIL,MOTRIN) 400 MG tablet Take 400 mg by mouth as needed for mild pain (1-3).     • pantoprazole (PROTONIX) 40 MG EC tablet Take 1 tablet by mouth Daily. 30 tablet 5   • vitamin B-12 (CYANOCOBALAMIN) 1000 MCG tablet Take 1 tablet by mouth Daily. 90 tablet 3     No current facility-administered medications on file prior to visit.      Allergies as of 06/27/2019 - Reviewed 06/27/2019   Allergen Reaction Noted   • Penicillins  03/29/2016     Social History     Socioeconomic History   • Marital status:      Spouse name: Jihan   • Number of children: Not on file   • Years of education: College   • Highest education level: Not on file   Occupational History   • Occupation:      Employer: [employer]   Tobacco Use   • Smoking status: Never Smoker   • Smokeless tobacco: Never Used   Substance and Sexual Activity   • Alcohol  "use: No     Frequency: Never     Comment: Socially   • Drug use: No   • Sexual activity: Defer     Family History   Problem Relation Age of Onset   • Memory loss Mother    • Heart disease Father         Father with CABG in mid 60's   • Colon polyps Father    • Malig Hyperthermia Neg Hx        Review of Systems   Constitution: Positive for malaise/fatigue and weight loss.   Respiratory: Positive for snoring.    Skin: Positive for itching.   Gastrointestinal: Positive for abdominal pain, diarrhea and nausea.   Neurological: Positive for excessive daytime sleepiness, headaches and paresthesias.   Psychiatric/Behavioral: The patient is nervous/anxious.    All other systems reviewed and are negative.     Objective:     Vitals:    06/27/19 0903 06/27/19 0906   BP: 156/90 160/92   BP Location: Right arm Left arm   Patient Position: Sitting Sitting   Cuff Size: Adult Adult   Pulse: 66    SpO2: 98%    Weight: 97.4 kg (214 lb 12.8 oz)    Height: 177.8 cm (70\")      Body mass index is 30.82 kg/m².    Physical Exam   Constitutional: He is oriented to person, place, and time. He appears well-developed and well-nourished.   HENT:   Head: Normocephalic and atraumatic.   Eyes: Conjunctivae are normal.   Neck: Neck supple.   Cardiovascular: Normal rate and regular rhythm. Exam reveals no gallop and no friction rub.   No murmur heard.  Pulmonary/Chest: Effort normal and breath sounds normal.   Abdominal: Soft. There is no tenderness.   Musculoskeletal: He exhibits no edema.   Neurological: He is alert and oriented to person, place, and time.   Skin: Skin is warm.   Psychiatric: He has a normal mood and affect. His behavior is normal.     Lab Review:   Procedures    Assessment:       Diagnosis Plan   1. Precordial pain  Adult Stress Echo W/ Cont or Stress Agent if Necessary Per Protocol   2. Family history of coronary artery disease  Adult Stress Echo W/ Cont or Stress Agent if Necessary Per Protocol     Plan:       I suspect that the " discomfort and nausea the patient is experiencing is more from a GI source than from his heart.  Specifically, I suspect that he may have an issue with his gallbladder.  However, he already has an appointment with Dr. Vo in gastroenterology tomorrow.  I will defer the GI work-up to him.  However, from a cardiac perspective, he does have a family history of coronary artery disease and hyperlipidemia.  He also had some discomfort radiating to the left arm.  I am going to check an exercise stress echocardiogram at this time.  This will evaluate for any potential structural heart disease, as well as for ischemia.  I discussed this in detail with the patient, and he is agreeable to proceed.  Further plans will be made after the testing.

## 2019-06-28 ENCOUNTER — OFFICE VISIT (OUTPATIENT)
Dept: GASTROENTEROLOGY | Facility: CLINIC | Age: 49
End: 2019-06-28

## 2019-06-28 VITALS
DIASTOLIC BLOOD PRESSURE: 82 MMHG | TEMPERATURE: 98.3 F | WEIGHT: 214.8 LBS | HEIGHT: 70 IN | SYSTOLIC BLOOD PRESSURE: 136 MMHG | BODY MASS INDEX: 30.75 KG/M2

## 2019-06-28 DIAGNOSIS — D3A.00 CARCINOID TUMOR: Primary | ICD-10-CM

## 2019-06-28 DIAGNOSIS — R10.10 PAIN OF UPPER ABDOMEN: ICD-10-CM

## 2019-06-28 PROCEDURE — 99213 OFFICE O/P EST LOW 20 MIN: CPT | Performed by: INTERNAL MEDICINE

## 2019-06-28 RX ORDER — CALCIUM CARBONATE 750 MG/1
750 TABLET, CHEWABLE ORAL AS NEEDED
COMMUNITY

## 2019-06-28 NOTE — PROGRESS NOTES
Chief Complaint   Patient presents with   • ED follow up-chest pain       Bill Cano is a  49 y.o. male here for a follow up visit for epigastric pain    He is back again with intermittent epigastric pain.  This began about 10 years ago and I diagnosed gallbladder dysfunction.  He elected not to have his gallbladder out.  Symptoms are postprandial with epigastric pain radiating into the back.  Present now for 2 months.  Nausea but no vomiting.  Diarrhea, no rectal bleeding.  No lower abdominal pain.  10 pounds of weight loss.        Past Medical History:   Diagnosis Date   • Anxiety    • Blood in stool    • Cancer (CMS/HCC)     Terminal ileum (carcinoid tumor)   • Colon polyp    • Diarrhea    • Diverticulitis of colon    • Diverticulosis    • Dizziness    • Heart palpitations     Patient takes Atenolol   • History of kidney stones    • Hypercholesterolemia    • Hypertension    • Nephrolithiasis        Past Surgical History:   Procedure Laterality Date   • APPENDECTOMY     • COLON RESECTION Right 8/29/2016    Procedure: COLON RESECTION RIGHT LAPAROSCOPIC;  Surgeon: Baron Bedolla MD;  Location: [facility];  Service:    • COLONOSCOPY  05/21/2009    deanna, tics    • COLONOSCOPY N/A 8/16/2016    NBDEANNA, ruperto, one ileal polyp in terminal ileum, carcinoid tumor, TA w/low grade dysplasia   • COLONOSCOPY N/A 8/31/2017    - Patent side-to-side ileo-colonic anastomosis, characterized by inflammation, ulceration, visible sutures,congestion, edema, erosion and erythema- Diverticulosis in the sigmoid colon, in the descending colon, at the splenic flexure, in the transverse colon and atthe hepatic flexure.   • TONSILLECTOMY      CHILDHOOD       Scheduled Meds:    Continuous Infusions:  No current facility-administered medications for this visit.     PRN Meds:.    Allergies   Allergen Reactions   • Penicillins      Patient unsure had as a child       Social History     Socioeconomic History   • Marital status:       Spouse name: Jihan   • Number of children: Not on file   • Years of education: College   • Highest education level: Not on file   Occupational History   • Occupation:      Employer: [employer]   Tobacco Use   • Smoking status: Never Smoker   • Smokeless tobacco: Never Used   Substance and Sexual Activity   • Alcohol use: Yes     Frequency: Never     Comment: Socially   • Drug use: No   • Sexual activity: Defer       Family History   Problem Relation Age of Onset   • Memory loss Mother    • Heart disease Father         Father with CABG in mid 60's   • Colon polyps Father    • Malig Hyperthermia Neg Hx        Review of Systems   Gastrointestinal: Positive for abdominal distention, abdominal pain, diarrhea and nausea.   All other systems reviewed and are negative.      Vitals:    06/28/19 0924   BP: 136/82   Temp: 98.3 °F (36.8 °C)       Physical Exam   Constitutional: He is oriented to person, place, and time. He appears well-developed and well-nourished.   HENT:   Head: Normocephalic and atraumatic.   Eyes: Conjunctivae and EOM are normal.   Neck: Normal range of motion. No tracheal deviation present.   Cardiovascular: Normal rate and regular rhythm.   Pulmonary/Chest: Effort normal and breath sounds normal. No respiratory distress.   Abdominal: Soft. Bowel sounds are normal. He exhibits no distension and no mass. There is no tenderness. There is no rebound and no guarding.   Musculoskeletal: Normal range of motion.   Neurological: He is alert and oriented to person, place, and time.   Skin: Skin is warm and dry.   Psychiatric: He has a normal mood and affect. Judgment normal.   Nursing note and vitals reviewed.      No images are attached to the encounter.    Problem list    Epigastric pain  Nausea  History of gallbladder dysfunction 10 years ago  Weight loss  Diarrhea  Ileal carcinoid      Assessment/Plan    I believe he has gallstones, possibly microlithiasis or gallbladder dyskinesia  He  is scheduled for HIDA scan on Tuesday, July 2, I will add ultrasound of the abdomen on that day  I will schedule EGD if those tests are unremarkable  If he does continue to have gallbladder dyskinesia or gallstones I would recommend cholecystectomy he is agreeable to this now  I would continue acid reduction with Protonix as he tells me it does take the edge off  He is due for colonoscopy next year for a history of ileal carcinoid requiring surgery but no adjuvant therapy 4 years ago

## 2019-07-02 ENCOUNTER — HOSPITAL ENCOUNTER (OUTPATIENT)
Dept: ULTRASOUND IMAGING | Facility: HOSPITAL | Age: 49
Discharge: HOME | End: 2019-07-02
Admitting: INTERNAL MEDICINE

## 2019-07-02 ENCOUNTER — APPOINTMENT (OUTPATIENT)
Dept: NUCLEAR MEDICINE | Facility: HOSPITAL | Age: 49
End: 2019-07-02

## 2019-07-02 ENCOUNTER — HOSPITAL ENCOUNTER (OUTPATIENT)
Dept: NUCLEAR MEDICINE | Facility: HOSPITAL | Age: 49
Discharge: HOME | End: 2019-07-02

## 2019-07-02 DIAGNOSIS — R10.13 EPIGASTRIC PAIN: ICD-10-CM

## 2019-07-02 DIAGNOSIS — R19.7 DIARRHEA, UNSPECIFIED TYPE: ICD-10-CM

## 2019-07-02 DIAGNOSIS — R10.11 RIGHT UPPER QUADRANT PAIN: ICD-10-CM

## 2019-07-02 PROCEDURE — 0 TECHNETIUM TC 99M MEBROFENIN KIT: Performed by: NURSE PRACTITIONER

## 2019-07-02 PROCEDURE — A9537 TC99M MEBROFENIN: HCPCS | Performed by: NURSE PRACTITIONER

## 2019-07-02 PROCEDURE — 78227 HEPATOBIL SYST IMAGE W/DRUG: CPT

## 2019-07-02 PROCEDURE — 76700 US EXAM ABDOM COMPLETE: CPT

## 2019-07-02 PROCEDURE — 25010000002 SINCALIDE PER 5 MCG: Performed by: NURSE PRACTITIONER

## 2019-07-02 RX ORDER — KIT FOR THE PREPARATION OF TECHNETIUM TC 99M MEBROFENIN 45 MG/10ML
1 INJECTION, POWDER, LYOPHILIZED, FOR SOLUTION INTRAVENOUS
Status: COMPLETED | OUTPATIENT
Start: 2019-07-02 | End: 2019-07-02

## 2019-07-02 RX ADMIN — MEBROFENIN 1 DOSE: 45 INJECTION, POWDER, LYOPHILIZED, FOR SOLUTION INTRAVENOUS at 07:05

## 2019-07-02 RX ADMIN — SINCALIDE 1.9 MCG: 5 INJECTION, POWDER, LYOPHILIZED, FOR SOLUTION INTRAVENOUS at 08:32

## 2019-07-03 ENCOUNTER — TELEPHONE (OUTPATIENT)
Dept: INTERNAL MEDICINE | Facility: CLINIC | Age: 49
End: 2019-07-03

## 2019-07-03 NOTE — TELEPHONE ENCOUNTER
----- Message from Kanwal Roy sent at 7/3/2019  8:06 AM EDT -----  Contact: Pt   Pt returning phone call regarding imaging results.    Pt# [phone]

## 2019-07-03 NOTE — TELEPHONE ENCOUNTER
Discussed results with patient, he is scheduled for an EGD next week. He does report feeling better since starting Protonix.

## 2019-07-05 ENCOUNTER — HOSPITAL ENCOUNTER (OUTPATIENT)
Dept: CARDIOLOGY | Facility: HOSPITAL | Age: 49
Discharge: HOME | End: 2019-07-05
Admitting: INTERNAL MEDICINE

## 2019-07-05 VITALS
BODY MASS INDEX: 30.35 KG/M2 | HEIGHT: 70 IN | DIASTOLIC BLOOD PRESSURE: 90 MMHG | SYSTOLIC BLOOD PRESSURE: 130 MMHG | HEART RATE: 85 BPM | WEIGHT: 212 LBS

## 2019-07-05 DIAGNOSIS — R07.2 PRECORDIAL PAIN: ICD-10-CM

## 2019-07-05 DIAGNOSIS — Z82.49 FAMILY HISTORY OF CORONARY ARTERY DISEASE: ICD-10-CM

## 2019-07-05 LAB
ASCENDING AORTA: 2.6 CM
BH CV ECHO MEAS - ACS: 2 CM
BH CV ECHO MEAS - AO MAX PG: 5.6 MMHG
BH CV ECHO MEAS - AO ROOT AREA (BSA CORRECTED): 1.4
BH CV ECHO MEAS - AO ROOT AREA: 7 CM^2
BH CV ECHO MEAS - AO ROOT DIAM: 3 CM
BH CV ECHO MEAS - AO V2 MAX: 118.1 CM/SEC
BH CV ECHO MEAS - BSA(HAYCOCK): 2.2 M^2
BH CV ECHO MEAS - BSA: 2.1 M^2
BH CV ECHO MEAS - BZI_BMI: 30.4 KILOGRAMS/M^2
BH CV ECHO MEAS - BZI_METRIC_HEIGHT: 177.8 CM
BH CV ECHO MEAS - BZI_METRIC_WEIGHT: 96.2 KG
BH CV ECHO MEAS - EDV(MOD-SP4): 75 ML
BH CV ECHO MEAS - EDV(TEICH): 105.6 ML
BH CV ECHO MEAS - EF(CUBED): 68.9 %
BH CV ECHO MEAS - EF(MOD-BP): 64 %
BH CV ECHO MEAS - EF(MOD-SP4): 64 %
BH CV ECHO MEAS - EF(TEICH): 60.4 %
BH CV ECHO MEAS - ESV(MOD-SP4): 27 ML
BH CV ECHO MEAS - ESV(TEICH): 41.8 ML
BH CV ECHO MEAS - FS: 32.3 %
BH CV ECHO MEAS - IVS/LVPW: 0.96
BH CV ECHO MEAS - IVSD: 0.88 CM
BH CV ECHO MEAS - LAT PEAK E' VEL: 9 CM/SEC
BH CV ECHO MEAS - LV DIASTOLIC VOL/BSA (35-75): 35.1 ML/M^2
BH CV ECHO MEAS - LV MASS(C)D: 146.5 GRAMS
BH CV ECHO MEAS - LV MASS(C)DI: 68.5 GRAMS/M^2
BH CV ECHO MEAS - LV SYSTOLIC VOL/BSA (12-30): 12.6 ML/M^2
BH CV ECHO MEAS - LVIDD: 4.8 CM
BH CV ECHO MEAS - LVIDS: 3.2 CM
BH CV ECHO MEAS - LVLD AP4: 6.7 CM
BH CV ECHO MEAS - LVLS AP4: 5.1 CM
BH CV ECHO MEAS - LVPWD: 0.92 CM
BH CV ECHO MEAS - MED PEAK E' VEL: 7 CM/SEC
BH CV ECHO MEAS - MV A DUR: 0.11 SEC
BH CV ECHO MEAS - MV A MAX VEL: 75.7 CM/SEC
BH CV ECHO MEAS - MV DEC SLOPE: 366.5 CM/SEC^2
BH CV ECHO MEAS - MV DEC TIME: 0.18 SEC
BH CV ECHO MEAS - MV E MAX VEL: 66 CM/SEC
BH CV ECHO MEAS - MV E/A: 0.87
BH CV ECHO MEAS - MV MAX PG: 2.6 MMHG
BH CV ECHO MEAS - MV MEAN PG: 1.2 MMHG
BH CV ECHO MEAS - MV P1/2T MAX VEL: 73.3 CM/SEC
BH CV ECHO MEAS - MV P1/2T: 58.6 MSEC
BH CV ECHO MEAS - MV V2 MAX: 80.1 CM/SEC
BH CV ECHO MEAS - MV V2 MEAN: 52.8 CM/SEC
BH CV ECHO MEAS - MV V2 VTI: 24 CM
BH CV ECHO MEAS - MVA P1/2T LCG: 3 CM^2
BH CV ECHO MEAS - MVA(P1/2T): 3.8 CM^2
BH CV ECHO MEAS - PULM A REVS DUR: 0.12 SEC
BH CV ECHO MEAS - PULM A REVS VEL: 37.6 CM/SEC
BH CV ECHO MEAS - PULM DIAS VEL: 48 CM/SEC
BH CV ECHO MEAS - PULM S/D: 1.1
BH CV ECHO MEAS - PULM SYS VEL: 54.4 CM/SEC
BH CV ECHO MEAS - SI(CUBED): 34.8 ML/M^2
BH CV ECHO MEAS - SI(MOD-SP4): 22.4 ML/M^2
BH CV ECHO MEAS - SI(TEICH): 29.8 ML/M^2
BH CV ECHO MEAS - SUP REN AO DIAM: 2.1 CM
BH CV ECHO MEAS - SV(CUBED): 74.5 ML
BH CV ECHO MEAS - SV(MOD-SP4): 48 ML
BH CV ECHO MEAS - SV(TEICH): 63.8 ML
BH CV ECHO MEAS - TAPSE (>1.6): 2 CM2
BH CV ECHO MEASUREMENTS AVERAGE E/E' RATIO: 8.25
BH CV STRESS BP STAGE 1: NORMAL
BH CV STRESS BP STAGE 2: NORMAL
BH CV STRESS BP STAGE 3: NORMAL
BH CV STRESS DURATION MIN STAGE 1: 3
BH CV STRESS DURATION MIN STAGE 2: 3
BH CV STRESS DURATION MIN STAGE 3: 3
BH CV STRESS DURATION SEC STAGE 1: 0
BH CV STRESS DURATION SEC STAGE 2: 0
BH CV STRESS DURATION SEC STAGE 3: 0
BH CV STRESS ECHO POST STRESS EJECTION FRACTION EF: 73 %
BH CV STRESS GRADE STAGE 1: 10
BH CV STRESS GRADE STAGE 2: 12
BH CV STRESS GRADE STAGE 3: 14
BH CV STRESS HR STAGE 1: 112
BH CV STRESS HR STAGE 2: 138
BH CV STRESS HR STAGE 3: 154
BH CV STRESS METS STAGE 1: 5
BH CV STRESS METS STAGE 2: 7.5
BH CV STRESS METS STAGE 3: 10
BH CV STRESS PROTOCOL 1: NORMAL
BH CV STRESS SPEED STAGE 1: 1.7
BH CV STRESS SPEED STAGE 2: 2.5
BH CV STRESS SPEED STAGE 3: 3.4
BH CV STRESS STAGE 1: 1
BH CV STRESS STAGE 2: 2
BH CV STRESS STAGE 3: 3
BH CV XLRA - RV BASE: 3.3 CM
BH CV XLRA - TDI S': 14 CM/SEC
LEFT ATRIUM VOLUME INDEX: 12 ML/M2
LEFT ATRIUM VOLUME: 25 CM3
LV EF 2D ECHO EST: 64 %
MAXIMAL PREDICTED HEART RATE: 170 BPM
PERCENT MAX PREDICTED HR: 90.59 %
SINUS: 3.2 CM
STJ: 2.9 CM
STRESS BASELINE BP: NORMAL MMHG
STRESS BASELINE HR: 85 BPM
STRESS PERCENT HR: 107 %
STRESS POST ESTIMATED WORKLOAD: 10 METS
STRESS POST EXERCISE DUR MIN: 9 MIN
STRESS POST EXERCISE DUR SEC: 0 SEC
STRESS POST PEAK BP: NORMAL MMHG
STRESS POST PEAK HR: 154 BPM
STRESS TARGET HR: 145 BPM

## 2019-07-05 PROCEDURE — 93016 CV STRESS TEST SUPVJ ONLY: CPT | Performed by: INTERNAL MEDICINE

## 2019-07-05 PROCEDURE — 93018 CV STRESS TEST I&R ONLY: CPT | Performed by: INTERNAL MEDICINE

## 2019-07-05 PROCEDURE — 93350 STRESS TTE ONLY: CPT

## 2019-07-05 PROCEDURE — 93320 DOPPLER ECHO COMPLETE: CPT

## 2019-07-05 PROCEDURE — 93325 DOPPLER ECHO COLOR FLOW MAPG: CPT | Performed by: INTERNAL MEDICINE

## 2019-07-05 PROCEDURE — 93325 DOPPLER ECHO COLOR FLOW MAPG: CPT

## 2019-07-05 PROCEDURE — 93320 DOPPLER ECHO COMPLETE: CPT | Performed by: INTERNAL MEDICINE

## 2019-07-05 PROCEDURE — 93017 CV STRESS TEST TRACING ONLY: CPT

## 2019-07-05 PROCEDURE — 93350 STRESS TTE ONLY: CPT | Performed by: INTERNAL MEDICINE

## 2019-07-11 ENCOUNTER — OUTSIDE FACILITY SERVICE (OUTPATIENT)
Dept: GASTROENTEROLOGY | Facility: CLINIC | Age: 49
End: 2019-07-11

## 2019-07-11 PROCEDURE — 43239 EGD BIOPSY SINGLE/MULTIPLE: CPT | Performed by: INTERNAL MEDICINE

## 2019-07-15 ENCOUNTER — OFFICE VISIT (OUTPATIENT)
Dept: INTERNAL MEDICINE | Facility: CLINIC | Age: 49
End: 2019-07-15

## 2019-07-15 VITALS
SYSTOLIC BLOOD PRESSURE: 118 MMHG | HEART RATE: 77 BPM | OXYGEN SATURATION: 99 % | HEIGHT: 70 IN | BODY MASS INDEX: 30.21 KG/M2 | DIASTOLIC BLOOD PRESSURE: 78 MMHG | WEIGHT: 211 LBS

## 2019-07-15 DIAGNOSIS — R10.13 EPIGASTRIC PAIN: Primary | ICD-10-CM

## 2019-07-15 DIAGNOSIS — F41.9 ANXIETY: ICD-10-CM

## 2019-07-15 PROCEDURE — 99214 OFFICE O/P EST MOD 30 MIN: CPT | Performed by: NURSE PRACTITIONER

## 2019-07-15 RX ORDER — PANTOPRAZOLE SODIUM 40 MG/1
40 TABLET, DELAYED RELEASE ORAL DAILY
Qty: 90 TABLET | Refills: 1 | Status: SHIPPED | OUTPATIENT
Start: 2019-07-15 | End: 2020-01-13

## 2019-07-15 RX ORDER — FLUOXETINE 20 MG/1
20 TABLET, FILM COATED ORAL DAILY
Qty: 30 TABLET | Refills: 2 | Status: SHIPPED | OUTPATIENT
Start: 2019-07-15 | End: 2019-07-31 | Stop reason: SINTOL

## 2019-07-16 NOTE — PROGRESS NOTES
Subjective   Bill Cano is a 49 y.o. male who presents today for follow-up regarding abdominal pain.    His stress echo did not show any abnormalities.  His ultrasound of the abdomen was also normal.  His EGD showed non-bleeding erosive gastropathy.  He reports a feeling significantly better since starting Protonix daily.  He still has some food sensitivities, especially foods that are highly acidic.  His HIDA scan showed a normal ejection fraction.  He does report increased anxiety with stressors at home.  He has recently placed his mom in assisted living and has had some difficulty related to this.           The following portions of the patient's history were reviewed and updated as appropriate: allergies, current medications, past social history and problem list.    Past Medical History:   Diagnosis Date   • Anxiety    • Blood in stool    • Cancer (CMS/HCC)     Terminal ileum (carcinoid tumor)   • Colon polyp    • Diarrhea    • Diverticulitis of colon    • Diverticulosis    • Dizziness    • Heart palpitations     Patient takes Atenolol   • History of kidney stones    • Hypercholesterolemia    • Hypertension    • Nephrolithiasis          Current Outpatient Medications:   •  atenolol (TENORMIN) 25 MG tablet, Take 1/2 tablet every evening., Disp: 45 tablet, Rfl: 3  •  calcium carbonate EX (TUMS EX) 750 MG chewable tablet, Chew 750 mg As Needed for Indigestion or Heartburn., Disp: , Rfl:   •  ezetimibe-simvastatin (VYTORIN) 10-40 MG per tablet, Take 1 tablet by mouth Daily., Disp: 90 tablet, Rfl: 3  •  pantoprazole (PROTONIX) 40 MG EC tablet, Take 1 tablet by mouth Daily., Disp: 90 tablet, Rfl: 1  •  vitamin B-12 (CYANOCOBALAMIN) 1000 MCG tablet, Take 1 tablet by mouth Daily., Disp: 90 tablet, Rfl: 3  •  FLUoxetine (PROzac) 20 MG tablet, Take 1 tablet by mouth Daily for 90 days., Disp: 30 tablet, Rfl: 2    Allergies   Allergen Reactions   • Penicillins      Patient unsure had as a child       Review of Systems  "  Constitutional: Negative for chills, fatigue, fever and unexpected weight change.   HENT: Negative for congestion, ear pain, postnasal drip, sinus pressure, sore throat and trouble swallowing.    Eyes: Negative for visual disturbance.   Respiratory: Negative for cough, chest tightness and wheezing.    Cardiovascular: Negative for chest pain, palpitations and leg swelling.   Gastrointestinal: Positive for abdominal pain. Negative for blood in stool, nausea and vomiting.   Genitourinary: Negative for dysuria, frequency and urgency.   Musculoskeletal: Negative for arthralgias and joint swelling.   Skin: Negative for color change.   Neurological: Negative for syncope, weakness and headaches.   Hematological: Does not bruise/bleed easily.   Psychiatric/Behavioral: Positive for decreased concentration and dysphoric mood. Negative for sleep disturbance.       Objective   Vitals:    07/15/19 1359   BP: 118/78   BP Location: Left arm   Patient Position: Sitting   Cuff Size: Adult   Pulse: 77   SpO2: 99%   Weight: 95.7 kg (211 lb)   Height: 177.8 cm (70\")     Physical Exam   Constitutional: He appears well-developed and well-nourished. He is cooperative. He does not have a sickly appearance. He does not appear ill.   HENT:   Head: Normocephalic.   Right Ear: Hearing, tympanic membrane and external ear normal.   Left Ear: Hearing, tympanic membrane and external ear normal.   Nose: Nose normal. No mucosal edema, rhinorrhea, sinus tenderness or nasal deformity. Right sinus exhibits no maxillary sinus tenderness and no frontal sinus tenderness. Left sinus exhibits no maxillary sinus tenderness and no frontal sinus tenderness.   Mouth/Throat: Oropharynx is clear and moist and mucous membranes are normal. Normal dentition.   Eyes: Conjunctivae and lids are normal. Right eye exhibits no discharge and no exudate. Left eye exhibits no discharge and no exudate.   Neck: Trachea normal. Carotid bruit is not present. No edema present. " No thyroid mass present.   Cardiovascular: Regular rhythm, normal heart sounds and normal pulses.   No murmur heard.  Pulmonary/Chest: Breath sounds normal. No respiratory distress. He has no decreased breath sounds. He has no wheezes. He has no rhonchi. He has no rales.   Abdominal: Soft. There is no tenderness.   Lymphadenopathy:        Head (right side): No submental, no submandibular, no tonsillar, no preauricular, no posterior auricular and no occipital adenopathy present.        Head (left side): No submental, no submandibular, no tonsillar, no preauricular, no posterior auricular and no occipital adenopathy present.   Neurological: He is alert.   Skin: Skin is warm, dry and intact. No cyanosis. Nails show no clubbing.       Assessment/Plan   Bill was seen today for follow-up.    Diagnoses and all orders for this visit:    Epigastric pain  -     pantoprazole (PROTONIX) 40 MG EC tablet; Take 1 tablet by mouth Daily.    Anxiety  -     FLUoxetine (PROzac) 20 MG tablet; Take 1 tablet by mouth Daily for 90 days.    Reviewed results of recent EGD and abdominal ultrasound as well as stress echo with patient.  He will continue daily Protonix for now, we will taper off as symptoms improve.  We also discussed stress management, he will begin fluoxetine and we will reevaluate in 3 months.

## 2019-07-17 ENCOUNTER — TELEPHONE (OUTPATIENT)
Dept: GASTROENTEROLOGY | Facility: CLINIC | Age: 49
End: 2019-07-17

## 2019-07-17 NOTE — TELEPHONE ENCOUNTER
HIDA scan results (order was placed by FELIPE Stone).    Notes recorded by Geraldine Long APRN on 7/3/2019 at 8:24 AM EDT  Discussed results with patient, he is scheduled for an EGD next week. He does report feeling better since starting Protonix.      Pt had EGD done by Dr Vo 7/11/19.

## 2019-07-17 NOTE — TELEPHONE ENCOUNTER
----- Message from Will Vo MD sent at 7/5/2019 11:43 AM EDT -----  Ultrasound normal await HIDA scan

## 2019-07-17 NOTE — TELEPHONE ENCOUNTER
Called pt and advised that his U/S was normal. He verb understanding and states Dr Vo let him know this at the time of his EGD.

## 2019-07-19 ENCOUNTER — TELEPHONE (OUTPATIENT)
Dept: GASTROENTEROLOGY | Facility: CLINIC | Age: 49
End: 2019-07-19

## 2019-07-19 NOTE — TELEPHONE ENCOUNTER
Pt should be taking daily pantoprazole per med list.     Called pt... No answer after multiple rings; left a message that per Dr Vo pathology from his EGD was normal or negative and MD recommends to continue the pantoprazole. Pt to call back with any questions.

## 2019-07-31 ENCOUNTER — OFFICE VISIT (OUTPATIENT)
Dept: INTERNAL MEDICINE | Facility: CLINIC | Age: 49
End: 2019-07-31

## 2019-07-31 VITALS
HEART RATE: 82 BPM | BODY MASS INDEX: 29.26 KG/M2 | HEIGHT: 70 IN | DIASTOLIC BLOOD PRESSURE: 82 MMHG | SYSTOLIC BLOOD PRESSURE: 120 MMHG | RESPIRATION RATE: 16 BRPM | WEIGHT: 204.4 LBS | OXYGEN SATURATION: 98 % | TEMPERATURE: 98.5 F

## 2019-07-31 DIAGNOSIS — F41.9 ANXIETY: Primary | ICD-10-CM

## 2019-07-31 PROCEDURE — 99213 OFFICE O/P EST LOW 20 MIN: CPT | Performed by: NURSE PRACTITIONER

## 2019-07-31 RX ORDER — DULOXETIN HYDROCHLORIDE 30 MG/1
30 CAPSULE, DELAYED RELEASE ORAL DAILY
Qty: 30 CAPSULE | Refills: 2 | Status: SHIPPED | OUTPATIENT
Start: 2019-07-31 | End: 2019-08-20 | Stop reason: SDUPTHER

## 2019-08-20 DIAGNOSIS — F41.9 ANXIETY: ICD-10-CM

## 2019-08-20 RX ORDER — DULOXETIN HYDROCHLORIDE 30 MG/1
30 CAPSULE, DELAYED RELEASE ORAL DAILY
Qty: 90 CAPSULE | Refills: 2 | Status: SHIPPED | OUTPATIENT
Start: 2019-08-20 | End: 2020-04-13

## 2019-09-16 ENCOUNTER — HOSPITAL ENCOUNTER (OUTPATIENT)
Dept: CT IMAGING | Facility: HOSPITAL | Age: 49
Discharge: HOME | End: 2019-09-16
Admitting: INTERNAL MEDICINE

## 2019-09-16 ENCOUNTER — LAB (OUTPATIENT)
Dept: LAB | Facility: HOSPITAL | Age: 49
End: 2019-09-16

## 2019-09-16 DIAGNOSIS — D3A.012 CARCINOID TUMOR OF ILEUM: ICD-10-CM

## 2019-09-16 LAB
ALBUMIN SERPL-MCNC: 4.3 G/DL (ref 3.5–5.2)
ALBUMIN/GLOB SERPL: 1.7 G/DL
ALP SERPL-CCNC: 55 U/L (ref 39–117)
ALT SERPL W P-5'-P-CCNC: 49 U/L (ref 1–41)
ANION GAP SERPL CALCULATED.3IONS-SCNC: 8.1 MMOL/L (ref 5–15)
AST SERPL-CCNC: 30 U/L (ref 1–40)
BASOPHILS # BLD AUTO: 0.03 10*3/MM3 (ref 0–0.2)
BASOPHILS NFR BLD AUTO: 0.6 % (ref 0–1.5)
BILIRUB SERPL-MCNC: 0.5 MG/DL (ref 0.2–1.2)
BUN BLD-MCNC: 12 MG/DL (ref 6–20)
BUN/CREAT SERPL: 15.4 (ref 7–25)
CALCIUM SPEC-SCNC: 9.4 MG/DL (ref 8.6–10.5)
CHLORIDE SERPL-SCNC: 103 MMOL/L (ref 98–107)
CO2 SERPL-SCNC: 27.9 MMOL/L (ref 22–29)
CREAT BLD-MCNC: 0.78 MG/DL (ref 0.76–1.27)
CREAT BLDA-MCNC: 0.8 MG/DL (ref 0.6–1.3)
DEPRECATED RDW RBC AUTO: 43.9 FL (ref 37–54)
EOSINOPHIL # BLD AUTO: 0.08 10*3/MM3 (ref 0–0.4)
EOSINOPHIL NFR BLD AUTO: 1.6 % (ref 0.3–6.2)
ERYTHROCYTE [DISTWIDTH] IN BLOOD BY AUTOMATED COUNT: 13.1 % (ref 12.3–15.4)
GFR SERPL CREATININE-BSD FRML MDRD: 105 ML/MIN/1.73
GLOBULIN UR ELPH-MCNC: 2.6 GM/DL
GLUCOSE BLD-MCNC: 102 MG/DL (ref 65–99)
HCT VFR BLD AUTO: 46 % (ref 37.5–51)
HGB BLD-MCNC: 15.3 G/DL (ref 13–17.7)
IMM GRANULOCYTES # BLD AUTO: 0.04 10*3/MM3 (ref 0–0.05)
IMM GRANULOCYTES NFR BLD AUTO: 0.8 % (ref 0–0.5)
LYMPHOCYTES # BLD AUTO: 1.89 10*3/MM3 (ref 0.7–3.1)
LYMPHOCYTES NFR BLD AUTO: 37.7 % (ref 19.6–45.3)
MCH RBC QN AUTO: 30.3 PG (ref 26.6–33)
MCHC RBC AUTO-ENTMCNC: 33.3 G/DL (ref 31.5–35.7)
MCV RBC AUTO: 91.1 FL (ref 79–97)
MONOCYTES # BLD AUTO: 0.42 10*3/MM3 (ref 0.1–0.9)
MONOCYTES NFR BLD AUTO: 8.4 % (ref 5–12)
NEUTROPHILS # BLD AUTO: 2.55 10*3/MM3 (ref 1.7–7)
NEUTROPHILS NFR BLD AUTO: 50.9 % (ref 42.7–76)
NRBC BLD AUTO-RTO: 0 /100 WBC (ref 0–0.2)
PLATELET # BLD AUTO: 213 10*3/MM3 (ref 140–450)
PMV BLD AUTO: 9.2 FL (ref 6–12)
POTASSIUM BLD-SCNC: 5 MMOL/L (ref 3.5–5.2)
PROT SERPL-MCNC: 6.9 G/DL (ref 6–8.5)
RBC # BLD AUTO: 5.05 10*6/MM3 (ref 4.14–5.8)
SODIUM BLD-SCNC: 139 MMOL/L (ref 136–145)
VIT B12 BLD-MCNC: 472 PG/ML (ref 211–946)
WBC NRBC COR # BLD: 5.01 10*3/MM3 (ref 3.4–10.8)

## 2019-09-16 PROCEDURE — 71260 CT THORAX DX C+: CPT

## 2019-09-16 PROCEDURE — 86316 IMMUNOASSAY TUMOR OTHER: CPT

## 2019-09-16 PROCEDURE — 25010000002 IOPAMIDOL 61 % SOLUTION: Performed by: INTERNAL MEDICINE

## 2019-09-16 PROCEDURE — 82607 VITAMIN B-12: CPT

## 2019-09-16 PROCEDURE — 80053 COMPREHEN METABOLIC PANEL: CPT

## 2019-09-16 PROCEDURE — 74177 CT ABD & PELVIS W/CONTRAST: CPT

## 2019-09-16 PROCEDURE — 82565 ASSAY OF CREATININE: CPT

## 2019-09-16 PROCEDURE — 36415 COLL VENOUS BLD VENIPUNCTURE: CPT

## 2019-09-16 PROCEDURE — 85025 COMPLETE CBC W/AUTO DIFF WBC: CPT

## 2019-09-16 RX ADMIN — IOPAMIDOL 85 ML: 612 INJECTION, SOLUTION INTRAVENOUS at 07:46

## 2019-09-18 LAB — CGA SERPL-SCNC: <1 NMOL/L (ref 0–5)

## 2019-09-24 ENCOUNTER — OFFICE VISIT (OUTPATIENT)
Dept: ONCOLOGY | Facility: CLINIC | Age: 49
End: 2019-09-24

## 2019-09-24 VITALS
BODY MASS INDEX: 30.45 KG/M2 | RESPIRATION RATE: 16 BRPM | DIASTOLIC BLOOD PRESSURE: 89 MMHG | TEMPERATURE: 97.9 F | OXYGEN SATURATION: 98 % | WEIGHT: 212.7 LBS | HEIGHT: 70 IN | HEART RATE: 74 BPM | SYSTOLIC BLOOD PRESSURE: 145 MMHG

## 2019-09-24 DIAGNOSIS — D3A.00 CARCINOID TUMOR: ICD-10-CM

## 2019-09-24 DIAGNOSIS — K90.9 VITAMIN B12 DEFICIENCY DUE TO INTESTINAL MALABSORPTION: ICD-10-CM

## 2019-09-24 DIAGNOSIS — E53.8 VITAMIN B12 DEFICIENCY DUE TO INTESTINAL MALABSORPTION: ICD-10-CM

## 2019-09-24 DIAGNOSIS — D3A.012 CARCINOID TUMOR OF ILEUM: Primary | ICD-10-CM

## 2019-09-24 PROCEDURE — G0463 HOSPITAL OUTPT CLINIC VISIT: HCPCS | Performed by: INTERNAL MEDICINE

## 2019-09-24 PROCEDURE — 99214 OFFICE O/P EST MOD 30 MIN: CPT | Performed by: INTERNAL MEDICINE

## 2019-09-24 NOTE — PROGRESS NOTES
Subjective     REASON FOR FOLLOW UP:  RESECTED CARCINOID TUMOR of ILEUM    HISTORY OF PRESENT ILLNESS:  The patient is a 49 y.o. year old male who was having some abdominal complaints which led to a colonoscopy with Dr. Vo on 8/16/2016.  He was found to have a polyp in the terminal ileum which was biopsied and showed carcinoid tumor.  He subsequently underwent a staging CT scan of the abdomen and pelvis on 8/26/2016 which showed no evidence of distant metastases but did show a 1.7 cm regional lymph node.    The patient underwent a laparoscopic right hemicolectomy with resection of the terminal ileum and appendectomy performed by Dr Bedolla on 8/29/2016.  The pathology from that specimen as noted below showed an 8 mm carcinoid tumor in the terminal ileum with 1 out of 16 lymph nodes positive for metastatic carcinoid tumor (T3 N1 M0).    He was seen for initial consultation in our office in September 2016.  It was determined that he did not require any adjuvant treatment but we set up a surveillance schedule every 6 months or so to continue to monitor for recurrence.      He returns today for his routine follow-up visit with CT scans and labs.  He seems to be getting along well.  The CT scans from 9/16/2019 showed no evidence of recurrent or metastatic disease.  His serum chromogranin A level was normal with a value of <1.      His next colonoscopy with Dr. Vo will be in August 2020.    History of Present Illness     Past Medical History:   Diagnosis Date   • Anxiety    • Blood in stool    • Cancer (CMS/HCC)     Terminal ileum (carcinoid tumor)   • Colon polyp    • Diarrhea    • Diverticulitis of colon    • Diverticulosis    • Dizziness    • GERD (gastroesophageal reflux disease)    • Headache    • Heart palpitations     Patient takes Atenolol   • History of kidney stones    • Hypercholesterolemia    • Hypertension    • Low back pain    • Nephrolithiasis         Past Surgical History:   Procedure Laterality  "Date   • APPENDECTOMY     • COLON RESECTION Right 8/29/2016    Procedure: COLON RESECTION RIGHT LAPAROSCOPIC;  Surgeon: Baron Bedolla MD;  Location: [facility];  Service:    • COLONOSCOPY  05/21/2009    ih, tics    • COLONOSCOPY N/A 8/16/2016    NBIH, tics, one ileal polyp in terminal ileum, carcinoid tumor, TA w/low grade dysplasia   • COLONOSCOPY N/A 8/31/2017    - Patent side-to-side ileo-colonic anastomosis, characterized by inflammation, ulceration, visible sutures,congestion, edema, erosion and erythema- Diverticulosis in the sigmoid colon, in the descending colon, at the splenic flexure, in the transverse colon and atthe hepatic flexure.   • SMALL INTESTINE SURGERY  8/26/16    Bowel resection carinoid tumor   • TONSILLECTOMY      CHILDHOOD        ALLERGIES:    Allergies   Allergen Reactions   • Penicillins      Patient unsure had as a child         Review of Systems   Constitutional: Negative for activity change, appetite change, fatigue, fever and unexpected weight change.   HENT: Negative for hearing loss, nosebleeds, trouble swallowing and voice change.    Eyes: Negative for visual disturbance.   Respiratory: Negative for cough, shortness of breath and wheezing.    Cardiovascular: Negative for chest pain and palpitations.   Gastrointestinal: Negative for abdominal pain, diarrhea, nausea and vomiting.        Acid reflux   Genitourinary: Negative for difficulty urinating, frequency, hematuria and urgency.   Musculoskeletal: Negative for back pain and neck pain.   Skin: Negative for rash.   Neurological: Negative for dizziness, seizures, syncope and headaches.   Hematological: Negative for adenopathy. Does not bruise/bleed easily.   Psychiatric/Behavioral: Negative for behavioral problems. The patient is not nervous/anxious.         Objective     Vitals:    09/24/19 1608   Weight: 96.5 kg (212 lb 11.2 oz)   Height: 178 cm (70.08\")  Comment: new ht.   PainSc: 0-No pain     Current Status 9/24/2019 "   ECOG score 0       Physical Exam   Constitutional: He is oriented to person, place, and time. He appears well-developed and well-nourished. No distress.   HENT:   Head: Normocephalic.   Eyes: Conjunctivae and EOM are normal. Pupils are equal, round, and reactive to light. No scleral icterus.   Neck: Normal range of motion. Neck supple. No JVD present. No thyromegaly present.   Cardiovascular: Normal rate and regular rhythm. Exam reveals no gallop and no friction rub.   No murmur heard.  Pulmonary/Chest: Effort normal and breath sounds normal. He has no wheezes. He has no rales.   Abdominal: Soft. He exhibits no distension and no mass. There is no tenderness.   Healed surgical scar RLQ.   Musculoskeletal: Normal range of motion. He exhibits no edema or deformity.   Lymphadenopathy:     He has no cervical adenopathy.   Neurological: He is alert and oriented to person, place, and time. He has normal reflexes. No cranial nerve deficit.   Skin: Skin is warm and dry. No rash noted. No erythema.   Psychiatric: He has a normal mood and affect. His behavior is normal. Judgment normal.         RECENT LABS:  Hematology WBC   Date Value Ref Range Status   09/16/2019 5.01 3.40 - 10.80 10*3/mm3 Final     RBC   Date Value Ref Range Status   09/16/2019 5.05 4.14 - 5.80 10*6/mm3 Final     Hemoglobin   Date Value Ref Range Status   09/16/2019 15.3 13.0 - 17.7 g/dL Final     Hematocrit   Date Value Ref Range Status   09/16/2019 46.0 37.5 - 51.0 % Final     Platelets   Date Value Ref Range Status   09/16/2019 213 140 - 450 10*3/mm3 Final        Lab Results   Component Value Date    [id number] 472 09/16/2019 9/16/2019  Chromogranin A 0 - 5 nmol/L  <1       Lab Results   Component Value Date    GLUCOSE 102 (H) 09/16/2019    BUN 12 09/16/2019    CREATININE 0.78 09/16/2019    EGFRIFNONA 105 09/16/2019    EGFRIFAFRI  09/16/2016      Comment:      <15 Indicative of kidney failure.    BCR 15.4 09/16/2019    K 5.0 09/16/2019    CO2 27.9  09/16/2019    CALCIUM 9.4 09/16/2019    ALBUMIN 4.30 09/16/2019    AST 30 09/16/2019    ALT 49 (H) 09/16/2019     CT CHEST, ABDOMEN, AND PELVIS WITH IV CONTRAST 9/16/2019     HISTORY: 49-year-old male with resected carcinoid tumor from the  terminal ileum. Right hemicolectomy. Evaluate for metastatic disease.     TECHNIQUE: Radiation dose reduction techniques were utilized, including  automated exposure control and exposure modulation based on body size.   3 mm images were obtained through the chest, abdomen, and pelvis after  the administration of IV contrast. Compared with previous CT of the  abdomen and pelvis from 09/17/2018 and chest CT from 09/06/2017.     FINDINGS:  CHEST: There are no suspicious pulmonary opacities or nodules. There are  no pleural or pericardial effusions. There is no lymphadenopathy within  the chest.     ABDOMEN/PELVIS: The liver, gallbladder, pancreas, spleen, adrenals, and  kidneys appear unremarkable. No acute bowel abnormality is seen. There  is no free fluid or lymphadenopathy. No suspicious mesenteric nodules  are seen.     IMPRESSION:  Stable examination. There is no convincing evidence for  metastatic disease. No new abnormality is seen.    Assessment/Plan     1.  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  Patient has no evidence of distant metastatic disease on recent scans now 3 years out from initial diagnosis.  The serum chromogranin A level is normal   2.  Resection of the terminal ileum putting him at risk for future B12 deficiency.    Plan  1.  I discussed the results of the labs and CT scans from one week ago with the patient.    2.  Continue B12 tablets as maintenance 1000 µg by mouth daily.   3.  He otherwise will return for M.D. follow-up in 12 months and again will have labs and a CT scan performed 1 week prior to visit.  4.  He will follow-up with Dr. Vo for further surveillance  colonoscopies.

## 2019-10-14 ENCOUNTER — OFFICE VISIT (OUTPATIENT)
Dept: INTERNAL MEDICINE | Facility: CLINIC | Age: 49
End: 2019-10-14

## 2019-10-14 VITALS
SYSTOLIC BLOOD PRESSURE: 122 MMHG | DIASTOLIC BLOOD PRESSURE: 82 MMHG | HEIGHT: 70 IN | WEIGHT: 215 LBS | BODY MASS INDEX: 30.78 KG/M2

## 2019-10-14 DIAGNOSIS — Z23 NEED FOR VACCINATION: ICD-10-CM

## 2019-10-14 DIAGNOSIS — E78.5 HYPERLIPIDEMIA, UNSPECIFIED HYPERLIPIDEMIA TYPE: ICD-10-CM

## 2019-10-14 DIAGNOSIS — K31.9 GASTROPATHY: ICD-10-CM

## 2019-10-14 DIAGNOSIS — F41.9 ANXIETY: Primary | ICD-10-CM

## 2019-10-14 PROCEDURE — 90471 IMMUNIZATION ADMIN: CPT | Performed by: NURSE PRACTITIONER

## 2019-10-14 PROCEDURE — 99214 OFFICE O/P EST MOD 30 MIN: CPT | Performed by: NURSE PRACTITIONER

## 2019-10-14 PROCEDURE — 90715 TDAP VACCINE 7 YRS/> IM: CPT | Performed by: NURSE PRACTITIONER

## 2019-10-16 NOTE — PROGRESS NOTES
Subjective   Bill Cano is a 49 y.o. male who presents for f/u regarding anxiety, hyperlipidemia and GERD.     He is tolerating Cymbalta 30mg well (started at last visit) and states medication has been helpful with anxiety. He c/o mild nausea initially which has since resolved.   He is taking Protonix daily and reports improvement in dyspepsia, denies abdominal pain.  He also takes Vytorin daily for mgmt of elevated cholesterol, denies myalgias with medication.    Hyperlipidemia   This is a chronic problem. The current episode started more than 1 year ago. The problem is controlled. Recent lipid tests were reviewed and are low. He has no history of diabetes or hypothyroidism. Pertinent negatives include no chest pain. Current antihyperlipidemic treatment includes ezetimibe and statins. The current treatment provides significant improvement of lipids. There are no compliance problems.         The following portions of the patient's history were reviewed and updated as appropriate: allergies, current medications, past social history and problem list.    Past Medical History:   Diagnosis Date   • Anxiety    • Blood in stool    • Cancer (CMS/HCC)     Terminal ileum (carcinoid tumor)   • Colon polyp    • Diarrhea    • Diverticulitis of colon    • Diverticulosis    • Dizziness    • GERD (gastroesophageal reflux disease)    • Headache    • Heart palpitations     Patient takes Atenolol   • History of kidney stones    • Hypercholesterolemia    • Hypertension    • Low back pain    • Nephrolithiasis          Current Outpatient Medications:   •  atenolol (TENORMIN) 25 MG tablet, Take 1/2 tablet every evening., Disp: 45 tablet, Rfl: 3  •  calcium carbonate EX (TUMS EX) 750 MG chewable tablet, Chew 750 mg As Needed for Indigestion or Heartburn., Disp: , Rfl:   •  DULoxetine (CYMBALTA) 30 MG capsule, Take 1 capsule by mouth Daily., Disp: 90 capsule, Rfl: 2  •  ezetimibe-simvastatin (VYTORIN) 10-40 MG per tablet, Take 1 tablet  "by mouth Daily., Disp: 90 tablet, Rfl: 3  •  pantoprazole (PROTONIX) 40 MG EC tablet, Take 1 tablet by mouth Daily., Disp: 90 tablet, Rfl: 1  •  vitamin B-12 (CYANOCOBALAMIN) 1000 MCG tablet, Take 1 tablet by mouth Daily., Disp: 90 tablet, Rfl: 3    Allergies   Allergen Reactions   • Penicillins      Patient unsure had as a child       Review of Systems   Constitutional: Negative for chills, fatigue, fever and unexpected weight change.   HENT: Negative for congestion, ear pain, postnasal drip, sinus pressure, sore throat and trouble swallowing.    Eyes: Negative for visual disturbance.   Respiratory: Negative for cough, chest tightness and wheezing.    Cardiovascular: Negative for chest pain, palpitations and leg swelling.   Gastrointestinal: Negative for abdominal pain, blood in stool, nausea and vomiting.   Genitourinary: Negative for dysuria, frequency and urgency.   Musculoskeletal: Negative for arthralgias and joint swelling.   Skin: Negative for color change.   Neurological: Negative for syncope, weakness and headaches.   Hematological: Does not bruise/bleed easily.   Psychiatric/Behavioral: Positive for dysphoric mood. The patient is nervous/anxious.         Improved with medication       Objective   Vitals:    10/14/19 1413   BP: 122/82   BP Location: Left arm   Patient Position: Sitting   Cuff Size: Adult   Weight: 97.5 kg (215 lb)   Height: 177.8 cm (70\")     Physical Exam   Constitutional: He appears well-developed and well-nourished. He is cooperative. He does not have a sickly appearance. He does not appear ill.   HENT:   Head: Normocephalic.   Right Ear: Hearing, tympanic membrane and external ear normal.   Left Ear: Hearing, tympanic membrane and external ear normal.   Nose: Nose normal. No mucosal edema, rhinorrhea, sinus tenderness or nasal deformity. Right sinus exhibits no maxillary sinus tenderness and no frontal sinus tenderness. Left sinus exhibits no maxillary sinus tenderness and no frontal " sinus tenderness.   Mouth/Throat: Oropharynx is clear and moist and mucous membranes are normal. Normal dentition.   Eyes: Conjunctivae and lids are normal. Right eye exhibits no discharge and no exudate. Left eye exhibits no discharge and no exudate.   Neck: Trachea normal. Carotid bruit is not present. No edema present. No thyroid mass present.   Cardiovascular: Regular rhythm, normal heart sounds and normal pulses.   No murmur heard.  Pulmonary/Chest: Breath sounds normal. No respiratory distress. He has no decreased breath sounds. He has no wheezes. He has no rhonchi. He has no rales.   Abdominal: Soft. There is no tenderness.   Lymphadenopathy:        Head (right side): No submental, no submandibular, no tonsillar, no preauricular, no posterior auricular and no occipital adenopathy present.        Head (left side): No submental, no submandibular, no tonsillar, no preauricular, no posterior auricular and no occipital adenopathy present.   Neurological: He is alert.   Skin: Skin is warm, dry and intact. No cyanosis. Nails show no clubbing.       Assessment/Plan   Bill was seen today for anxiety and hyperlipidemia.    Diagnoses and all orders for this visit:    Anxiety  Comments:  discussed increasing Cymbalta to 60mg which he does not feel is necessary, continue to monitor    Gastropathy  Comments:  non-erosive gastropathy noted on EGD with neg path, continue Protonix    Hyperlipidemia, unspecified hyperlipidemia type  Comments:  tolerating Vytorin    Need for vaccination  -     Tdap Vaccine Greater Than or Equal To 6yo IM    He is doing well with current medications, will return for PE with fasting labs.

## 2020-01-10 DIAGNOSIS — R10.13 EPIGASTRIC PAIN: ICD-10-CM

## 2020-01-13 RX ORDER — PANTOPRAZOLE SODIUM 40 MG/1
TABLET, DELAYED RELEASE ORAL
Qty: 90 TABLET | Refills: 1 | Status: SHIPPED | OUTPATIENT
Start: 2020-01-13 | End: 2020-06-29

## 2020-01-15 DIAGNOSIS — I10 ESSENTIAL HYPERTENSION: ICD-10-CM

## 2020-01-15 RX ORDER — ATENOLOL 25 MG/1
TABLET ORAL
Qty: 45 TABLET | Refills: 0 | Status: SHIPPED | OUTPATIENT
Start: 2020-01-15 | End: 2020-01-17 | Stop reason: SDUPTHER

## 2020-01-15 RX ORDER — ATENOLOL 25 MG/1
TABLET ORAL
Qty: 45 TABLET | Refills: 3 | Status: SHIPPED | OUTPATIENT
Start: 2020-01-15 | End: 2020-01-15 | Stop reason: SDUPTHER

## 2020-01-15 RX ORDER — EZETIMIBE AND SIMVASTATIN 10; 40 MG/1; MG/1
1 TABLET ORAL DAILY
Qty: 30 TABLET | Refills: 0 | Status: SHIPPED | OUTPATIENT
Start: 2020-01-15 | End: 2020-01-17 | Stop reason: SDUPTHER

## 2020-01-17 ENCOUNTER — OFFICE VISIT (OUTPATIENT)
Dept: INTERNAL MEDICINE | Facility: CLINIC | Age: 50
End: 2020-01-17

## 2020-01-17 VITALS
OXYGEN SATURATION: 98 % | SYSTOLIC BLOOD PRESSURE: 126 MMHG | HEART RATE: 68 BPM | WEIGHT: 225 LBS | BODY MASS INDEX: 32.21 KG/M2 | DIASTOLIC BLOOD PRESSURE: 84 MMHG | HEIGHT: 70 IN

## 2020-01-17 DIAGNOSIS — Z12.5 SCREENING FOR PROSTATE CANCER: ICD-10-CM

## 2020-01-17 DIAGNOSIS — E78.5 HYPERLIPIDEMIA, UNSPECIFIED HYPERLIPIDEMIA TYPE: ICD-10-CM

## 2020-01-17 DIAGNOSIS — R00.2 PALPITATIONS: ICD-10-CM

## 2020-01-17 DIAGNOSIS — E53.8 B12 DEFICIENCY: ICD-10-CM

## 2020-01-17 DIAGNOSIS — Z12.11 SCREENING FOR COLON CANCER: ICD-10-CM

## 2020-01-17 DIAGNOSIS — Z87.442 HISTORY OF KIDNEY STONES: ICD-10-CM

## 2020-01-17 DIAGNOSIS — Z00.00 PHYSICAL EXAM: Primary | ICD-10-CM

## 2020-01-17 LAB
ALBUMIN SERPL-MCNC: 4.7 G/DL (ref 3.5–5.2)
ALBUMIN/GLOB SERPL: 2.5 G/DL
ALP SERPL-CCNC: 51 U/L (ref 39–117)
ALT SERPL-CCNC: 34 U/L (ref 1–41)
AST SERPL-CCNC: 23 U/L (ref 1–40)
BASOPHILS # BLD AUTO: 0.03 10*3/MM3 (ref 0–0.2)
BASOPHILS NFR BLD AUTO: 0.6 % (ref 0–1.5)
BILIRUB SERPL-MCNC: 0.7 MG/DL (ref 0.2–1.2)
BILIRUB UR QL STRIP: NEGATIVE
BUN SERPL-MCNC: 16 MG/DL (ref 6–20)
BUN/CREAT SERPL: 18.2 (ref 7–25)
CALCIUM SERPL-MCNC: 9.4 MG/DL (ref 8.6–10.5)
CHLORIDE SERPL-SCNC: 100 MMOL/L (ref 98–107)
CHOLEST SERPL-MCNC: 165 MG/DL (ref 0–200)
CLARITY UR: CLEAR
CO2 SERPL-SCNC: 27.9 MMOL/L (ref 22–29)
COLOR UR: YELLOW
CREAT SERPL-MCNC: 0.88 MG/DL (ref 0.76–1.27)
EOSINOPHIL # BLD AUTO: 0.06 10*3/MM3 (ref 0–0.4)
EOSINOPHIL # BLD AUTO: 1.1 % (ref 0.3–6.2)
ERYTHROCYTE [DISTWIDTH] IN BLOOD BY AUTOMATED COUNT: 12.6 % (ref 12.3–15.4)
GLOBULIN SER CALC-MCNC: 1.9 GM/DL
GLUCOSE SERPL-MCNC: 96 MG/DL (ref 65–99)
GLUCOSE UR STRIP-MCNC: NEGATIVE MG/DL
HCT VFR BLD AUTO: 43.9 % (ref 37.5–51)
HDLC SERPL-MCNC: 63 MG/DL (ref 40–60)
HEMOCCULT STL QL IA: NEGATIVE
HGB BLD-MCNC: 15.2 G/DL (ref 13–17.7)
HGB UR QL STRIP.AUTO: NEGATIVE
IMM GRANULOCYTES # BLD: 0.01 10*3/MM3 (ref 0–0.05)
IMM GRANULOCYTES NFR BLD: 0.2 % (ref 0–0.5)
KETONES UR QL STRIP: NEGATIVE
LDLC SERPL CALC-MCNC: 84 MG/DL (ref 0–100)
LEUKOCYTE ESTERASE UR QL STRIP.AUTO: NEGATIVE
LYMPHOCYTES # BLD AUTO: 2.06 10*3/MM3 (ref 0.7–3.1)
LYMPHOCYTES NFR BLD AUTO: 39.1 % (ref 19.6–45.3)
MCH RBC QN AUTO: 30.6 PG (ref 26.6–33)
MCHC RBC AUTO-ENTMCNC: 34.6 G/DL (ref 31.5–35.7)
MCV RBC AUTO: 88.3 FL (ref 79–97)
MONOCYTES # BLD AUTO: 0.49 10*3/MM3 (ref 0.1–0.9)
MONOCYTES NFR BLD AUTO: 9.3 % (ref 5–12)
NEUTROPHILS # BLD AUTO: 2.62 10*3/MM3 (ref 1.7–7)
NEUTROPHILS NFR BLD AUTO: 49.7 % (ref 42.7–76)
NITRITE UR QL STRIP: NEGATIVE
NRBC BLD AUTO-RTO: 0 /100 WBC (ref 0–0.2)
PH UR STRIP.AUTO: 7 [PH] (ref 5–8)
PLATELET # BLD AUTO: 236 10*3/MM3 (ref 140–450)
POTASSIUM SERPL-SCNC: 4.8 MMOL/L (ref 3.5–5.2)
PROT SERPL-MCNC: 6.6 G/DL (ref 6–8.5)
PROT UR QL STRIP: NEGATIVE
PSA SERPL-MCNC: 0.42 NG/ML (ref 0–4)
RBC # BLD AUTO: 4.97 10*6/MM3 (ref 4.14–5.8)
SODIUM SERPL-SCNC: 139 MMOL/L (ref 136–145)
SP GR UR STRIP: 1.02 (ref 1–1.03)
TRIGL SERPL-MCNC: 92 MG/DL (ref 0–150)
UROBILINOGEN UR QL STRIP: NORMAL
VLDLC SERPL-MCNC: 18.4 MG/DL
WBC # BLD AUTO: 5.27 10*3/MM3 (ref 3.4–10.8)

## 2020-01-17 PROCEDURE — 81003 URINALYSIS AUTO W/O SCOPE: CPT | Performed by: NURSE PRACTITIONER

## 2020-01-17 PROCEDURE — 82274 ASSAY TEST FOR BLOOD FECAL: CPT | Performed by: NURSE PRACTITIONER

## 2020-01-17 PROCEDURE — 99396 PREV VISIT EST AGE 40-64: CPT | Performed by: NURSE PRACTITIONER

## 2020-01-17 RX ORDER — EZETIMIBE AND SIMVASTATIN 10; 40 MG/1; MG/1
1 TABLET ORAL DAILY
Qty: 90 TABLET | Refills: 1 | Status: SHIPPED | OUTPATIENT
Start: 2020-01-17 | End: 2020-08-10

## 2020-01-17 RX ORDER — ATENOLOL 25 MG/1
TABLET ORAL
Qty: 45 TABLET | Refills: 1 | Status: SHIPPED | OUTPATIENT
Start: 2020-01-17 | End: 2020-06-29

## 2020-01-18 LAB
TSH SERPL-ACNC: 2.58 UIU/ML (ref 0.27–4.2)
VIT B12 SERPL-MCNC: 631 PG/ML (ref 211–946)

## 2020-01-19 PROBLEM — R19.5 HEME + STOOL: Status: RESOLVED | Noted: 2017-08-10 | Resolved: 2020-01-19

## 2020-01-19 PROBLEM — K90.9 VITAMIN B12 DEFICIENCY DUE TO INTESTINAL MALABSORPTION: Status: ACTIVE | Noted: 2017-03-21

## 2020-01-19 NOTE — PATIENT INSTRUCTIONS
Health Maintenance, Male  A healthy lifestyle and preventive care is important for your health and wellness. Ask your health care provider about what schedule of regular examinations is right for you.  What should I know about weight and diet?  Eat a Healthy Diet  · Eat plenty of vegetables, fruits, whole grains, low-fat dairy products, and lean protein.  · Do not eat a lot of foods high in solid fats, added sugars, or salt.    Maintain a Healthy Weight  Regular exercise can help you achieve or maintain a healthy weight. You should:  · Do at least 150 minutes of exercise each week. The exercise should increase your heart rate and make you sweat (moderate-intensity exercise).  · Do strength-training exercises at least twice a week.  Watch Your Levels of Cholesterol and Blood Lipids  · Have your blood tested for lipids and cholesterol every 5 years starting at 35 years of age. If you are at high risk for heart disease, you should start having your blood tested when you are 20 years old. You may need to have your cholesterol levels checked more often if:  ? Your lipid or cholesterol levels are high.  ? You are older than 50 years of age.  ? You are at high risk for heart disease.  What should I know about cancer screening?  Many types of cancers can be detected early and may often be prevented.  Lung Cancer  · You should be screened every year for lung cancer if:  ? You are a current smoker who has smoked for at least 30 years.  ? You are a former smoker who has quit within the past 15 years.  · Talk to your health care provider about your screening options, when you should start screening, and how often you should be screened.  Colorectal Cancer  · Routine colorectal cancer screening usually begins at 50 years of age and should be repeated every 5-10 years until you are 75 years old. You may need to be screened more often if early forms of precancerous polyps or small growths are found. Your health care provider may  recommend screening at an earlier age if you have risk factors for colon cancer.  · Your health care provider may recommend using home test kits to check for hidden blood in the stool.  · A small camera at the end of a tube can be used to examine your colon (sigmoidoscopy or colonoscopy). This checks for the earliest forms of colorectal cancer.  Prostate and Testicular Cancer  · Depending on your age and overall health, your health care provider may do certain tests to screen for prostate and testicular cancer.  · Talk to your health care provider about any symptoms or concerns you have about testicular or prostate cancer.  Skin Cancer  · Check your skin from head to toe regularly.  · Tell your health care provider about any new moles or changes in moles, especially if:  ? There is a change in a mole’s size, shape, or color.  ? You have a mole that is larger than a pencil eraser.  · Always use sunscreen. Apply sunscreen liberally and repeat throughout the day.  · Protect yourself by wearing long sleeves, pants, a wide-brimmed hat, and sunglasses when outside.  What should I know about heart disease, diabetes, and high blood pressure?  · If you are 18-39 years of age, have your blood pressure checked every 3-5 years. If you are 40 years of age or older, have your blood pressure checked every year. You should have your blood pressure measured twice--once when you are at a hospital or clinic, and once when you are not at a hospital or clinic. Record the average of the two measurements. To check your blood pressure when you are not at a hospital or clinic, you can use:  ? An automated blood pressure machine at a pharmacy.  ? A home blood pressure monitor.  · Talk to your health care provider about your target blood pressure.  · If you are between 45-79 years old, ask your health care provider if you should take aspirin to prevent heart disease.  · Have regular diabetes screenings by checking your fasting blood sugar  level.  ? If you are at a normal weight and have a low risk for diabetes, have this test once every three years after the age of 45.  ? If you are overweight and have a high risk for diabetes, consider being tested at a younger age or more often.  · A one-time screening for abdominal aortic aneurysm (AAA) by ultrasound is recommended for men aged 65-75 years who are current or former smokers.  What should I know about preventing infection?  Hepatitis B  If you have a higher risk for hepatitis B, you should be screened for this virus. Talk with your health care provider to find out if you are at risk for hepatitis B infection.  Hepatitis C  Blood testing is recommended for:  · Everyone born from 1945 through 1965.  · Anyone with known risk factors for hepatitis C.  Sexually Transmitted Diseases (STDs)  · You should be screened each year for STDs including gonorrhea and chlamydia if:  ? You are sexually active and are younger than 24 years of age.  ? You are older than 24 years of age and your health care provider tells you that you are at risk for this type of infection.  ? Your sexual activity has changed since you were last screened and you are at an increased risk for chlamydia or gonorrhea. Ask your health care provider if you are at risk.  · Talk with your health care provider about whether you are at high risk of being infected with HIV. Your health care provider may recommend a prescription medicine to help prevent HIV infection.  What else can I do?  · Schedule regular health, dental, and eye exams.  · Stay current with your vaccines (immunizations).  · Do not use any tobacco products, such as cigarettes, chewing tobacco, and e-cigarettes. If you need help quitting, ask your health care provider.  · Limit alcohol intake to no more than 2 drinks per day. One drink equals 12 ounces of beer, 5 ounces of wine, or 1½ ounces of hard liquor.  · Do not use street drugs.  · Do not share needles.  · Ask your health  care provider for help if you need support or information about quitting drugs.  · Tell your health care provider if you often feel depressed.  · Tell your health care provider if you have ever been abused or do not feel safe at home.  This information is not intended to replace advice given to you by your health care provider. Make sure you discuss any questions you have with your health care provider.  Document Released: 06/15/2009 Document Revised: 08/16/2017 Document Reviewed: 09/20/2016  [employer] Patient Education © 2019 [employer]

## 2020-01-19 NOTE — PROGRESS NOTES
Subjective   Bill Cano is a 49 y.o. male who presents for a physical exam.    History of Present Illness     The following portions of the patient's history were reviewed and updated as appropriate: allergies, current medications, past social history and problem list.    Past Medical History:   Diagnosis Date   • Anxiety    • Blood in stool    • Cancer (CMS/HCC)     Terminal ileum (carcinoid tumor)   • Colon polyp    • Diarrhea    • Diverticulitis of colon    • Diverticulosis    • Dizziness    • GERD (gastroesophageal reflux disease)    • Headache    • Heart palpitations     Patient takes Atenolol   • History of kidney stones    • Hypercholesterolemia    • Hypertension    • Low back pain    • Nephrolithiasis          Current Outpatient Medications:   •  atenolol (TENORMIN) 25 MG tablet, Take 1/2 tablet every evening., Disp: 45 tablet, Rfl: 1  •  calcium carbonate EX (TUMS EX) 750 MG chewable tablet, Chew 750 mg As Needed for Indigestion or Heartburn., Disp: , Rfl:   •  DULoxetine (CYMBALTA) 30 MG capsule, Take 1 capsule by mouth Daily., Disp: 90 capsule, Rfl: 2  •  ezetimibe-simvastatin (VYTORIN) 10-40 MG per tablet, Take 1 tablet by mouth Daily., Disp: 90 tablet, Rfl: 1  •  pantoprazole (PROTONIX) 40 MG EC tablet, TAKE 1 TABLET DAILY, Disp: 90 tablet, Rfl: 1  •  vitamin B-12 (CYANOCOBALAMIN) 1000 MCG tablet, Take 1 tablet by mouth Daily., Disp: 90 tablet, Rfl: 3    Allergies   Allergen Reactions   • Penicillins      Patient unsure had as a child       Review of Systems   Constitutional: Negative for activity change, appetite change, chills, diaphoresis, fatigue, fever and unexpected weight change.        Has gained weight over the past several months due to improvement in abdominal pain   HENT: Positive for congestion, postnasal drip and tinnitus (no acute changes). Negative for dental problem, drooling, ear discharge, ear pain, facial swelling, hearing loss, mouth sores, nosebleeds, rhinorrhea, sinus  "pressure, sore throat and trouble swallowing.    Eyes: Positive for visual disturbance (hx contact lenses, no visual changes). Negative for photophobia, pain, discharge, redness and itching.   Respiratory: Negative for apnea, cough, choking, chest tightness, shortness of breath and wheezing.    Cardiovascular: Positive for palpitations (improved with Atenolol). Negative for chest pain and leg swelling.        No orthopnea, PND, MILLER   Gastrointestinal: Negative for abdominal pain, blood in stool, constipation, diarrhea, nausea and vomiting.   Endocrine: Negative for cold intolerance, heat intolerance, polydipsia and polyuria.   Genitourinary: Negative for decreased urine volume, dysuria, enuresis, flank pain, frequency, hematuria and urgency.   Musculoskeletal: Positive for arthralgias (bilateral knee stiffness) and back pain. Negative for gait problem, joint swelling, myalgias, neck pain and neck stiffness.   Skin: Negative for color change and rash.        C/o itching bilateral wrists  No hair changes, no nail changes   Allergic/Immunologic: Negative for environmental allergies, food allergies and immunocompromised state.   Neurological: Negative for dizziness, tremors, seizures, syncope, speech difficulty, weakness, light-headedness, numbness and headaches.   Hematological: Negative for adenopathy. Does not bruise/bleed easily.   Psychiatric/Behavioral: Negative for agitation, confusion, decreased concentration, dysphoric mood, sleep disturbance and suicidal ideas. The patient is not nervous/anxious.        Objective   Vitals:    01/17/20 0758   BP: 126/84   BP Location: Left arm   Patient Position: Sitting   Cuff Size: Adult   Pulse: 68   SpO2: 98%   Weight: 102 kg (225 lb)   Height: 177.8 cm (70\")     Body mass index is 32.28 kg/m².  Physical Exam   Constitutional: He is oriented to person, place, and time. He appears well-developed and well-nourished. No distress.   HENT:   Head: Normocephalic and atraumatic. "   Right Ear: External ear normal.   Left Ear: External ear normal.   Nose: Nose normal.   Mouth/Throat: Oropharynx is clear and moist.   Eyes: Pupils are equal, round, and reactive to light. Conjunctivae and EOM are normal. Right eye exhibits no discharge. Left eye exhibits no discharge. No scleral icterus.   Neck: Normal range of motion. Neck supple. No JVD present. No tracheal deviation present. No thyromegaly present.   Cardiovascular: Normal rate, regular rhythm, normal heart sounds and intact distal pulses. Exam reveals no gallop and no friction rub.   No murmur heard.  Pulmonary/Chest: Effort normal and breath sounds normal. No respiratory distress. He has no wheezes. He has no rales. He exhibits no tenderness.   Abdominal: Soft. Bowel sounds are normal. He exhibits no distension and no mass. There is no tenderness. There is no rebound and no guarding. No hernia.   Genitourinary: Rectum normal, prostate normal and penis normal. Rectal exam shows guaiac negative stool.   Musculoskeletal: Normal range of motion. He exhibits no edema.   Lymphadenopathy:     He has no cervical adenopathy.   Neurological: He is alert and oriented to person, place, and time. He has normal reflexes. No cranial nerve deficit. He exhibits normal muscle tone. Coordination normal.   Skin: Skin is warm and dry. No rash noted. No erythema.   Psychiatric: He has a normal mood and affect. His behavior is normal. Judgment and thought content normal.   Vitals reviewed.      Assessment/Plan   Bill was seen today for annual exam.    Diagnoses and all orders for this visit:    Physical exam  -     CBC Auto Differential; Future  -     Comprehensive Metabolic Panel; Future  -     Lipid Panel; Future  -     TSH; Future  -     Urinalysis With Microscopic If Indicated (No Culture) - Urine, Clean Catch; Future  -     CBC Auto Differential  -     Comprehensive Metabolic Panel  -     Lipid Panel  -     TSH  -     Urinalysis With Microscopic If  Indicated (No Culture) - Urine, Clean Catch    Palpitations  -     atenolol (TENORMIN) 25 MG tablet; Take 1/2 tablet every evening.    History of kidney stones    B12 deficiency  -     Vitamin B12; Future  -     Vitamin B12    Hyperlipidemia, unspecified hyperlipidemia type  -     ezetimibe-simvastatin (VYTORIN) 10-40 MG per tablet; Take 1 tablet by mouth Daily.    Screening for prostate cancer  -     PSA Screen; Future  -     PSA Screen    Screening for colon cancer  -     POC FECAL OCCULT BLOOD BY IMMUNOASSAY    Risk assessment:  His anxiety has improved with Cymbalta 30mg and would like to continue current dose.  He c/o tinnitus, receives annual hearing tests at work.  He is followed every 3 years due to personal hx of a carcinoid tumor of the distal ileum in 2016. He is followed by oncology annually.  His BMI is 32.3, he is trying to increased his activity level and modify diet with the goal of weight loss.  His palpitations are controlled with Atenolol, had cardiac evaluation earlier this year due to atypical chest pain with normal results.    Prevention:  Tdap is current. He has received his annual flu vaccine.  Discussed Shingrix vaccine, he will check with pharmacy regarding coverage and availability.

## 2020-04-12 DIAGNOSIS — F41.9 ANXIETY: ICD-10-CM

## 2020-04-13 RX ORDER — DULOXETIN HYDROCHLORIDE 30 MG/1
CAPSULE, DELAYED RELEASE ORAL
Qty: 90 CAPSULE | Refills: 2 | Status: SHIPPED | OUTPATIENT
Start: 2020-04-13 | End: 2021-01-10

## 2020-05-13 ENCOUNTER — TELEPHONE (OUTPATIENT)
Dept: GASTROENTEROLOGY | Facility: CLINIC | Age: 50
End: 2020-05-13

## 2020-05-13 ENCOUNTER — PREP FOR SURGERY (OUTPATIENT)
Dept: OTHER | Facility: HOSPITAL | Age: 50
End: 2020-05-13

## 2020-05-13 DIAGNOSIS — K63.5 COLON POLYPS: Primary | ICD-10-CM

## 2020-05-13 DIAGNOSIS — K21.9 GASTROESOPHAGEAL REFLUX DISEASE WITHOUT ESOPHAGITIS: ICD-10-CM

## 2020-05-13 NOTE — TELEPHONE ENCOUNTER
----- Message from Will Vo MD sent at 5/13/2020 11:38 AM EDT -----  Regarding: OA colon and egd  OA colon ok, split dose  suprep or miralax prep  History of carcinoid tumor, please schedule both EGD and colonoscopy

## 2020-05-14 ENCOUNTER — TELEPHONE (OUTPATIENT)
Dept: GASTROENTEROLOGY | Facility: CLINIC | Age: 50
End: 2020-05-14

## 2020-05-14 NOTE — TELEPHONE ENCOUNTER
----- Message from Renetta Marin sent at 5/13/2020  3:48 PM EDT -----  Regarding: Reschedule   Contact: [phone]  Pt needs to reschedule procedure     #If call returned today (5/13) please call [phone]

## 2020-05-14 NOTE — TELEPHONE ENCOUNTER
Returned patients call, rescheduled scope to 6/17 arrive at 1030am. Scheduled with April at [facility]

## 2020-06-17 ENCOUNTER — OUTSIDE FACILITY SERVICE (OUTPATIENT)
Dept: GASTROENTEROLOGY | Facility: CLINIC | Age: 50
End: 2020-06-17

## 2020-06-17 PROCEDURE — 45380 COLONOSCOPY AND BIOPSY: CPT | Performed by: INTERNAL MEDICINE

## 2020-06-17 PROCEDURE — 43235 EGD DIAGNOSTIC BRUSH WASH: CPT | Performed by: INTERNAL MEDICINE

## 2020-06-29 DIAGNOSIS — R00.2 PALPITATIONS: ICD-10-CM

## 2020-06-29 DIAGNOSIS — R10.13 EPIGASTRIC PAIN: ICD-10-CM

## 2020-06-29 RX ORDER — PANTOPRAZOLE SODIUM 40 MG/1
TABLET, DELAYED RELEASE ORAL
Qty: 90 TABLET | Refills: 1 | Status: SHIPPED | OUTPATIENT
Start: 2020-06-29 | End: 2021-01-10

## 2020-06-29 RX ORDER — ATENOLOL 25 MG/1
TABLET ORAL
Qty: 45 TABLET | Refills: 1 | Status: SHIPPED | OUTPATIENT
Start: 2020-06-29 | End: 2021-01-10

## 2020-06-30 NOTE — PROGRESS NOTES
Biopsy showed mild incidental colitis from the prep  No further work-up required  EGD and colonoscopy recall 5 years

## 2020-07-10 ENCOUNTER — TELEPHONE (OUTPATIENT)
Dept: GASTROENTEROLOGY | Facility: CLINIC | Age: 50
End: 2020-07-10

## 2020-07-10 NOTE — TELEPHONE ENCOUNTER
----- Message from Will Vo MD sent at 6/30/2020  6:14 PM EDT -----  Biopsy showed mild incidental colitis from the prep  No further work-up required  EGD and colonoscopy recall 5 years

## 2020-07-20 ENCOUNTER — OFFICE VISIT (OUTPATIENT)
Dept: INTERNAL MEDICINE | Facility: CLINIC | Age: 50
End: 2020-07-20

## 2020-07-20 VITALS
BODY MASS INDEX: 32.93 KG/M2 | OXYGEN SATURATION: 98 % | SYSTOLIC BLOOD PRESSURE: 124 MMHG | HEIGHT: 70 IN | HEART RATE: 80 BPM | DIASTOLIC BLOOD PRESSURE: 80 MMHG | WEIGHT: 230 LBS

## 2020-07-20 DIAGNOSIS — I10 ESSENTIAL HYPERTENSION: Primary | ICD-10-CM

## 2020-07-20 DIAGNOSIS — E78.5 HYPERLIPIDEMIA, UNSPECIFIED HYPERLIPIDEMIA TYPE: ICD-10-CM

## 2020-07-20 DIAGNOSIS — Z85.060 PERSONAL HISTORY OF MALIGNANT CARCINOID TUMOR OF SMALL INTESTINE: ICD-10-CM

## 2020-07-20 DIAGNOSIS — E53.8 B12 DEFICIENCY: ICD-10-CM

## 2020-07-20 DIAGNOSIS — Z11.59 SCREENING FOR VIRAL DISEASE: ICD-10-CM

## 2020-07-20 DIAGNOSIS — F41.9 ANXIETY: ICD-10-CM

## 2020-07-20 PROCEDURE — 99214 OFFICE O/P EST MOD 30 MIN: CPT | Performed by: NURSE PRACTITIONER

## 2020-07-21 PROBLEM — K57.33 DIVERTICULITIS OF LARGE INTESTINE WITHOUT PERFORATION OR ABSCESS WITH BLEEDING: Status: RESOLVED | Noted: 2017-08-10 | Resolved: 2020-07-21

## 2020-07-21 NOTE — PROGRESS NOTES
Subjective   Bill Cano is a 50 y.o. male who presents for follow-up regarding hyperlipidemia, vitamin B12 deficiency and anxiety.    He had an EGD and colonoscopy June 2020 due to a personal history of colonic polyps and carcinoid tumor of the small bowel in the past.  No significant abnormalities were noted on colonoscopy or EGD.  Repeat is planned for 5 years.  He has managed on pantoprazole daily which she is tolerating well.  He is also doing very well with Cymbalta 30 mg daily and is tolerating medication.  He reports significant improvement in anxiety since beginning medication.    Hyperlipidemia   This is a chronic problem. The current episode started more than 1 year ago. The problem is controlled. Recent lipid tests were reviewed and are low. He has no history of diabetes or hypothyroidism. Pertinent negatives include no chest pain. Current antihyperlipidemic treatment includes statins and ezetimibe. The current treatment provides significant improvement of lipids. There are no compliance problems.    Hypertension   This is a chronic problem. The current episode started more than 1 year ago. The problem is unchanged. The problem is controlled. Associated symptoms include anxiety. Pertinent negatives include no chest pain, headaches or palpitations. Current antihypertension treatment includes beta blockers. The current treatment provides significant improvement. There are no compliance problems.         The following portions of the patient's history were reviewed and updated as appropriate: allergies, current medications, past social history and problem list.    Past Medical History:   Diagnosis Date   • Anxiety    • Blood in stool    • Cancer (CMS/HCC)     Terminal ileum (carcinoid tumor)   • Colon polyp    • Diarrhea    • Diverticulitis of colon    • Diverticulosis    • Dizziness    • GERD (gastroesophageal reflux disease)    • Headache    • Heart palpitations     Patient takes Atenolol   • History  "of kidney stones    • Hypercholesterolemia    • Hypertension    • Low back pain    • Nephrolithiasis          Current Outpatient Medications:   •  atenolol (TENORMIN) 25 MG tablet, TAKE 1/2 TABLET EVERY      EVENING, Disp: 45 tablet, Rfl: 1  •  calcium carbonate EX (TUMS EX) 750 MG chewable tablet, Chew 750 mg As Needed for Indigestion or Heartburn., Disp: , Rfl:   •  DULoxetine (CYMBALTA) 30 MG capsule, TAKE 1 CAPSULE DAILY, Disp: 90 capsule, Rfl: 2  •  ezetimibe-simvastatin (VYTORIN) 10-40 MG per tablet, Take 1 tablet by mouth Daily., Disp: 90 tablet, Rfl: 1  •  pantoprazole (PROTONIX) 40 MG EC tablet, TAKE 1 TABLET DAILY, Disp: 90 tablet, Rfl: 1  •  vitamin B-12 (CYANOCOBALAMIN) 1000 MCG tablet, Take 1 tablet by mouth Daily., Disp: 90 tablet, Rfl: 3    Allergies   Allergen Reactions   • Penicillins      Patient unsure had as a child       Review of Systems   Constitutional: Negative for chills, fatigue, fever and unexpected weight change.   HENT: Positive for congestion and postnasal drip. Negative for ear pain, sinus pressure, sore throat and trouble swallowing.    Eyes: Negative for visual disturbance.   Respiratory: Negative for cough, chest tightness and wheezing.    Cardiovascular: Negative for chest pain, palpitations and leg swelling.   Gastrointestinal: Negative for abdominal pain, blood in stool, nausea and vomiting.        Denies reflux symptoms with daily pantoprazole   Genitourinary: Negative for dysuria, frequency and urgency.   Musculoskeletal: Negative for arthralgias and joint swelling.   Skin: Negative for color change.   Neurological: Negative for syncope, weakness and headaches.   Hematological: Does not bruise/bleed easily.   Psychiatric/Behavioral: The patient is nervous/anxious.        Objective   Vitals:    07/20/20 1506   BP: 124/80   BP Location: Left arm   Patient Position: Sitting   Cuff Size: Adult   Pulse: 80   SpO2: 98%   Weight: 104 kg (230 lb)   Height: 177.8 cm (70\")     Body mass " index is 33 kg/m².  Physical Exam   Constitutional: He appears well-developed and well-nourished. He is cooperative. He does not have a sickly appearance. He does not appear ill.   HENT:   Head: Normocephalic.   Right Ear: Hearing, tympanic membrane and external ear normal.   Left Ear: Hearing, tympanic membrane and external ear normal.   Nose: Nose normal. No mucosal edema, rhinorrhea, sinus tenderness or nasal deformity. Right sinus exhibits no maxillary sinus tenderness and no frontal sinus tenderness. Left sinus exhibits no maxillary sinus tenderness and no frontal sinus tenderness.   Mouth/Throat: Oropharynx is clear and moist and mucous membranes are normal. Normal dentition.   Eyes: Conjunctivae and lids are normal. Right eye exhibits no discharge and no exudate. Left eye exhibits no discharge and no exudate.   Neck: Trachea normal. Carotid bruit is not present. No edema present. No thyroid mass present.   Cardiovascular: Regular rhythm, normal heart sounds and normal pulses.   No murmur heard.  Pulmonary/Chest: Breath sounds normal. No respiratory distress. He has no decreased breath sounds. He has no wheezes. He has no rhonchi. He has no rales.   Abdominal: Soft. Bowel sounds are normal. There is no tenderness.   Lymphadenopathy:        Head (right side): No submental, no submandibular, no tonsillar, no preauricular, no posterior auricular and no occipital adenopathy present.        Head (left side): No submental, no submandibular, no tonsillar, no preauricular, no posterior auricular and no occipital adenopathy present.   Neurological: He is alert.   Skin: Skin is warm, dry and intact. No cyanosis. Nails show no clubbing.       Assessment/Plan   Bill was seen today for hyperlipidemia, anxiety, vitamin b12 deficiency and hypertension.    Diagnoses and all orders for this visit:    Essential hypertension    Hyperlipidemia, unspecified hyperlipidemia type  -     Lipid Panel; Future    Anxiety    B12  deficiency  -     Vitamin B12    Personal history of malignant carcinoid tumor of small intestine    Screening for viral disease  -     Hepatitis C Antibody; Future    1.  His blood pressure is well controlled on current medications which he will continue.  2.  He is tolerating Vytorin without myalgias, will recheck lipid panel with next labs.  3.  He is doing very well with daily duloxetine and states low dose has controlled his symptoms.  4.  He is taking an over-the-counter vitamin B12 supplement, will recheck levels with next labs.  4.  He is followed by oncology and has recently had a colonoscopy.

## 2020-08-09 DIAGNOSIS — E78.5 HYPERLIPIDEMIA, UNSPECIFIED HYPERLIPIDEMIA TYPE: ICD-10-CM

## 2020-08-10 RX ORDER — EZETIMIBE AND SIMVASTATIN 10; 40 MG/1; MG/1
TABLET ORAL
Qty: 90 TABLET | Refills: 1 | Status: SHIPPED | OUTPATIENT
Start: 2020-08-10 | End: 2021-01-10

## 2020-09-23 ENCOUNTER — HOSPITAL ENCOUNTER (OUTPATIENT)
Dept: PET IMAGING | Facility: HOSPITAL | Age: 50
Discharge: HOME | End: 2020-09-23
Admitting: INTERNAL MEDICINE

## 2020-09-23 ENCOUNTER — LAB (OUTPATIENT)
Dept: LAB | Facility: HOSPITAL | Age: 50
End: 2020-09-23

## 2020-09-23 DIAGNOSIS — E53.8 VITAMIN B12 DEFICIENCY DUE TO INTESTINAL MALABSORPTION: ICD-10-CM

## 2020-09-23 DIAGNOSIS — D3A.012 CARCINOID TUMOR OF ILEUM: ICD-10-CM

## 2020-09-23 DIAGNOSIS — Z11.59 SCREENING FOR VIRAL DISEASE: ICD-10-CM

## 2020-09-23 DIAGNOSIS — K90.9 VITAMIN B12 DEFICIENCY DUE TO INTESTINAL MALABSORPTION: ICD-10-CM

## 2020-09-23 DIAGNOSIS — E53.8 B12 DEFICIENCY: ICD-10-CM

## 2020-09-23 DIAGNOSIS — D3A.00 CARCINOID TUMOR: ICD-10-CM

## 2020-09-23 DIAGNOSIS — E78.5 HYPERLIPIDEMIA, UNSPECIFIED HYPERLIPIDEMIA TYPE: ICD-10-CM

## 2020-09-23 LAB
ALBUMIN SERPL-MCNC: 4.7 G/DL (ref 3.5–5.2)
ALBUMIN/GLOB SERPL: 1.7 G/DL (ref 1.1–2.4)
ALP SERPL-CCNC: 56 U/L (ref 38–116)
ALT SERPL W P-5'-P-CCNC: 59 U/L (ref 0–41)
ANION GAP SERPL CALCULATED.3IONS-SCNC: 10.4 MMOL/L (ref 5–15)
AST SERPL-CCNC: 37 U/L (ref 0–40)
BASOPHILS # BLD AUTO: 0.03 10*3/MM3 (ref 0–0.2)
BASOPHILS NFR BLD AUTO: 0.5 % (ref 0–1.5)
BILIRUB SERPL-MCNC: 0.8 MG/DL (ref 0.2–1.2)
BUN SERPL-MCNC: 18 MG/DL (ref 6–20)
BUN/CREAT SERPL: 19.8 (ref 7.3–30)
CALCIUM SPEC-SCNC: 9.9 MG/DL (ref 8.5–10.2)
CHLORIDE SERPL-SCNC: 99 MMOL/L (ref 98–107)
CHOLEST SERPL-MCNC: 185 MG/DL (ref 0–200)
CO2 SERPL-SCNC: 25.6 MMOL/L (ref 22–29)
CREAT BLDA-MCNC: 0.8 MG/DL (ref 0.6–1.3)
CREAT SERPL-MCNC: 0.91 MG/DL (ref 0.7–1.3)
DEPRECATED RDW RBC AUTO: 43.2 FL (ref 37–54)
EOSINOPHIL # BLD AUTO: 0.06 10*3/MM3 (ref 0–0.4)
EOSINOPHIL NFR BLD AUTO: 1 % (ref 0.3–6.2)
ERYTHROCYTE [DISTWIDTH] IN BLOOD BY AUTOMATED COUNT: 13.3 % (ref 12.3–15.4)
GFR SERPL CREATININE-BSD FRML MDRD: 88 ML/MIN/1.73
GLOBULIN UR ELPH-MCNC: 2.7 GM/DL (ref 1.8–3.5)
GLUCOSE SERPL-MCNC: 85 MG/DL (ref 74–124)
HCT VFR BLD AUTO: 45.8 % (ref 37.5–51)
HCV AB SER DONR QL: NORMAL
HDLC SERPL-MCNC: 56 MG/DL (ref 40–60)
HGB BLD-MCNC: 15.1 G/DL (ref 13–17.7)
IMM GRANULOCYTES # BLD AUTO: 0.01 10*3/MM3 (ref 0–0.05)
IMM GRANULOCYTES NFR BLD AUTO: 0.2 % (ref 0–0.5)
LDLC SERPL CALC-MCNC: 98 MG/DL (ref 0–100)
LDLC/HDLC SERPL: 1.76 {RATIO}
LYMPHOCYTES # BLD AUTO: 2.33 10*3/MM3 (ref 0.7–3.1)
LYMPHOCYTES NFR BLD AUTO: 37.8 % (ref 19.6–45.3)
MCH RBC QN AUTO: 29.3 PG (ref 26.6–33)
MCHC RBC AUTO-ENTMCNC: 33 G/DL (ref 31.5–35.7)
MCV RBC AUTO: 88.8 FL (ref 79–97)
MONOCYTES # BLD AUTO: 0.56 10*3/MM3 (ref 0.1–0.9)
MONOCYTES NFR BLD AUTO: 9.1 % (ref 5–12)
NEUTROPHILS NFR BLD AUTO: 3.17 10*3/MM3 (ref 1.7–7)
NEUTROPHILS NFR BLD AUTO: 51.4 % (ref 42.7–76)
NRBC BLD AUTO-RTO: 0 /100 WBC (ref 0–0.2)
PLATELET # BLD AUTO: 224 10*3/MM3 (ref 140–450)
PMV BLD AUTO: 9.1 FL (ref 6–12)
POTASSIUM SERPL-SCNC: 4.2 MMOL/L (ref 3.5–4.7)
PROT SERPL-MCNC: 7.4 G/DL (ref 6.3–8)
RBC # BLD AUTO: 5.16 10*6/MM3 (ref 4.14–5.8)
SODIUM SERPL-SCNC: 135 MMOL/L (ref 134–145)
TRIGL SERPL-MCNC: 153 MG/DL (ref 0–150)
VIT B12 BLD-MCNC: 653 PG/ML (ref 211–946)
VLDLC SERPL-MCNC: 30.6 MG/DL (ref 5–40)
WBC # BLD AUTO: 6.16 10*3/MM3 (ref 3.4–10.8)

## 2020-09-23 PROCEDURE — 80061 LIPID PANEL: CPT | Performed by: NURSE PRACTITIONER

## 2020-09-23 PROCEDURE — 82607 VITAMIN B-12: CPT | Performed by: NURSE PRACTITIONER

## 2020-09-23 PROCEDURE — 80053 COMPREHEN METABOLIC PANEL: CPT

## 2020-09-23 PROCEDURE — 85025 COMPLETE CBC W/AUTO DIFF WBC: CPT

## 2020-09-23 PROCEDURE — 25010000002 IOPAMIDOL 61 % SOLUTION: Performed by: INTERNAL MEDICINE

## 2020-09-23 PROCEDURE — 86803 HEPATITIS C AB TEST: CPT | Performed by: NURSE PRACTITIONER

## 2020-09-23 PROCEDURE — 36415 COLL VENOUS BLD VENIPUNCTURE: CPT

## 2020-09-23 PROCEDURE — 74177 CT ABD & PELVIS W/CONTRAST: CPT

## 2020-09-23 PROCEDURE — 71260 CT THORAX DX C+: CPT

## 2020-09-23 PROCEDURE — 0 DIATRIZOATE MEGLUMINE & SODIUM PER 1 ML: Performed by: INTERNAL MEDICINE

## 2020-09-23 PROCEDURE — 82565 ASSAY OF CREATININE: CPT

## 2020-09-23 RX ADMIN — DIATRIZOATE MEGLUMINE AND DIATRIZOATE SODIUM 30 ML: 660; 100 LIQUID ORAL; RECTAL at 07:45

## 2020-09-23 RX ADMIN — IOPAMIDOL 85 ML: 612 INJECTION, SOLUTION INTRAVENOUS at 07:45

## 2020-09-24 LAB — CGA SERPL-MCNC: 82.2 NG/ML (ref 0–101.8)

## 2020-10-01 ENCOUNTER — APPOINTMENT (OUTPATIENT)
Dept: LAB | Facility: HOSPITAL | Age: 50
End: 2020-10-01

## 2020-10-01 ENCOUNTER — OFFICE VISIT (OUTPATIENT)
Dept: ONCOLOGY | Facility: CLINIC | Age: 50
End: 2020-10-01

## 2020-10-01 VITALS
HEART RATE: 82 BPM | OXYGEN SATURATION: 97 % | SYSTOLIC BLOOD PRESSURE: 130 MMHG | RESPIRATION RATE: 16 BRPM | TEMPERATURE: 97.7 F | WEIGHT: 233.6 LBS | BODY MASS INDEX: 33.44 KG/M2 | DIASTOLIC BLOOD PRESSURE: 88 MMHG | HEIGHT: 70 IN

## 2020-10-01 DIAGNOSIS — C7A.012 MALIGNANT CARCINOID TUMOR OF ILEUM (HCC): Primary | ICD-10-CM

## 2020-10-01 PROCEDURE — 99214 OFFICE O/P EST MOD 30 MIN: CPT | Performed by: INTERNAL MEDICINE

## 2020-10-01 NOTE — PROGRESS NOTES
Subjective     REASON FOR FOLLOW UP:  RESECTED CARCINOID TUMOR of ILEUM    HISTORY OF PRESENT ILLNESS:  The patient is a 50 y.o. year old male who was having some abdominal complaints which led to a colonoscopy with Dr. Vo on 8/16/2016.  He was found to have a polyp in the terminal ileum which was biopsied and showed carcinoid tumor.  He subsequently underwent a staging CT scan of the abdomen and pelvis on 8/26/2016 which showed no evidence of distant metastases but did show a 1.7 cm regional lymph node.    The patient underwent a laparoscopic right hemicolectomy with resection of the terminal ileum and appendectomy performed by Dr Bedolla on 8/29/2016.  The pathology from that specimen as noted below showed an 8 mm carcinoid tumor in the terminal ileum with 1 out of 16 lymph nodes positive for metastatic carcinoid tumor (T3 N1 M0).    He was seen for initial consultation in our office in September 2016.  It was determined that he did not require any adjuvant treatment but we set up a surveillance schedule every 6 months or so to continue to monitor for recurrence.      He returns today for his routine follow-up visit with CT scans and labs.  He seems to be getting along well.  The CT scans from 9/16/2019 showed no evidence of recurrent or metastatic disease.  His serum chromogranin A level was normal with a value of <1.      His last colonoscopy with Dr. Vo was on 6/17/2020.  The next scope would be planned in 5 years in June 2025.    History of Present Illness     Past Medical History:   Diagnosis Date   • Anxiety    • Blood in stool    • Cancer (CMS/HCC)     Terminal ileum (carcinoid tumor)   • Colon polyp    • Diarrhea    • Diverticulitis of colon    • Diverticulosis    • Dizziness    • GERD (gastroesophageal reflux disease)    • Headache    • Heart palpitations     Patient takes Atenolol   • History of kidney stones    • Hypercholesterolemia    • Hypertension    • Low back pain    • Nephrolithiasis          Past Surgical History:   Procedure Laterality Date   • APPENDECTOMY     • COLON RESECTION Right 8/29/2016    Procedure: COLON RESECTION RIGHT LAPAROSCOPIC;  Surgeon: Baron Bedolla MD;  Location: [facility];  Service:    • COLONOSCOPY  05/21/2009    ih, tics    • COLONOSCOPY N/A 8/16/2016    NBIH, tics, one ileal polyp in terminal ileum, carcinoid tumor, TA w/low grade dysplasia   • COLONOSCOPY N/A 8/31/2017    - Patent side-to-side ileo-colonic anastomosis, characterized by inflammation, ulceration, visible sutures,congestion, edema, erosion and erythema- Diverticulosis in the sigmoid colon, in the descending colon, at the splenic flexure, in the transverse colon and atthe hepatic flexure.   • SMALL INTESTINE SURGERY  8/26/16    Bowel resection carinoid tumor   • TONSILLECTOMY      CHILDHOOD        ALLERGIES:    Allergies   Allergen Reactions   • Penicillins      Patient unsure had as a child         Review of Systems   Constitutional: Negative for activity change, appetite change, fatigue, fever and unexpected weight change.   HENT: Negative for hearing loss, nosebleeds, trouble swallowing and voice change.    Eyes: Negative for visual disturbance.   Respiratory: Negative for cough, shortness of breath and wheezing.    Cardiovascular: Negative for chest pain and palpitations.   Gastrointestinal: Negative for abdominal pain, diarrhea, nausea and vomiting.        Acid reflux   Genitourinary: Negative for difficulty urinating, frequency, hematuria and urgency.   Musculoskeletal: Negative for back pain and neck pain.   Skin: Negative for rash.   Neurological: Negative for dizziness, seizures, syncope and headaches.   Hematological: Negative for adenopathy. Does not bruise/bleed easily.   Psychiatric/Behavioral: Negative for behavioral problems. The patient is not nervous/anxious.    Reviewed and unchanged on the visit of 10/1/2020    Objective     Vitals:    10/01/20 1357   BP: 130/88   Pulse: 82   Resp:  "16   Temp: 97.7 °F (36.5 °C)   TempSrc: Skin   SpO2: 97%   Weight: 106 kg (233 lb 9.6 oz)   Height: 179 cm (70.47\")   PainSc: 0-No pain     Current Status 10/1/2020   ECOG score 0       Physical Exam   Constitutional: He is oriented to person, place, and time. He appears well-developed. No distress.   HENT:   Head: Normocephalic.   Eyes: Pupils are equal, round, and reactive to light. Conjunctivae are normal. No scleral icterus.   Neck: Normal range of motion. Neck supple. No JVD present. No thyromegaly present.   Cardiovascular: Normal rate and regular rhythm. Exam reveals no gallop and no friction rub.   No murmur heard.  Pulmonary/Chest: Effort normal and breath sounds normal. He has no wheezes. He has no rales.   Abdominal: Soft. He exhibits no distension and no mass. There is no abdominal tenderness.   Healed surgical scar RLQ.   Musculoskeletal: Normal range of motion. No deformity.   Lymphadenopathy:     He has no cervical adenopathy.   Neurological: He is alert and oriented to person, place, and time. He has normal reflexes. No cranial nerve deficit.   Skin: Skin is warm and dry. No rash noted. No erythema.   Psychiatric: His behavior is normal. Judgment normal.   Repeated and unchanged on the visit of 10/1/2020    RECENT LABS:  Hematology WBC   Date Value Ref Range Status   09/23/2020 6.16 3.40 - 10.80 10*3/mm3 Final   01/17/2020 5.27 3.40 - 10.80 10*3/mm3 Final     RBC   Date Value Ref Range Status   09/23/2020 5.16 4.14 - 5.80 10*6/mm3 Final   01/17/2020 4.97 4.14 - 5.80 10*6/mm3 Final     Hemoglobin   Date Value Ref Range Status   09/23/2020 15.1 13.0 - 17.7 g/dL Final     Hematocrit   Date Value Ref Range Status   09/23/2020 45.8 37.5 - 51.0 % Final     Platelets   Date Value Ref Range Status   09/23/2020 224 140 - 450 10*3/mm3 Final        Lab Results   Component Value Date    [id number] 653 09/23/2020     CHROMOGRANIN A   0.0 - 101.8 ng/mL      Lab Results   Component Value Date    GLUCOSE 85 " 09/23/2020    BUN 18 09/23/2020    CREATININE 0.80 09/23/2020    EGFRIFNONA 88 09/23/2020    EGFRIFAFRI 111 01/17/2020    BCR 19.8 09/23/2020    K 4.2 09/23/2020    CO2 25.6 09/23/2020    CALCIUM 9.9 09/23/2020    PROTENTOTREF 6.6 01/17/2020    ALBUMIN 4.70 09/23/2020    LABIL2 2.5 01/17/2020    AST 37 09/23/2020    ALT 59 (H) 09/23/2020     CT CHEST, ABDOMEN, AND PELVIS WITH IV CONTRAST 9/23/2020  COMPARISON: 09/16/1990     FINDINGS:     CHEST:   Lung fields are clear. There is no lymphadenopathy or pleural effusion.  No acute osseous abnormality.     ABDOMEN:   The liver, gallbladder, spleen are unremarkable. There is a tiny  nonobstructing stone in the left kidney. The right kidney is  unremarkable. The adrenal glands are unremarkable. Pancreas is  unremarkable. No ascites or lymphadenopathy.     PELVIS:  The bladder is unremarkable. There is no free fluid. Scattered  diverticula within the sigmoid. Bone windows show degenerative changes  at L4-5.     IMPRESSION:  No evidence of metastatic disease. Tiny nonobstructing left kidney stone     Assessment/Plan     1.  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  Patient has no evidence of distant metastatic disease on recent scans now 4 years out from initial diagnosis.  The serum chromogranin A level is normal   2.  Resection of the terminal ileum putting him at risk for future B12 deficiency.    Plan  1.  I discussed the results of the labs and CT scans from one week ago with the patient.    2.  Continue B12 tablets as maintenance 1000 µg by mouth daily.   3.  He otherwise will return for M.D. follow-up in 12 months and again will have labs and a CT scan performed 1 week prior to visit.  At that point he will be 5 years out from his resection and assuming scans look good at that point we will continue to follow-up yearly but we will order scans on an as needed basis.  4.  He will follow-up with  Dr. Vo for further surveillance colonoscopies.  Next scope will be due in June 2025

## 2021-01-10 DIAGNOSIS — F41.9 ANXIETY: ICD-10-CM

## 2021-01-10 DIAGNOSIS — R00.2 PALPITATIONS: ICD-10-CM

## 2021-01-10 DIAGNOSIS — E78.5 HYPERLIPIDEMIA, UNSPECIFIED HYPERLIPIDEMIA TYPE: ICD-10-CM

## 2021-01-10 DIAGNOSIS — R10.13 EPIGASTRIC PAIN: ICD-10-CM

## 2021-01-10 RX ORDER — DULOXETIN HYDROCHLORIDE 30 MG/1
CAPSULE, DELAYED RELEASE ORAL
Qty: 90 CAPSULE | Refills: 2 | Status: SHIPPED | OUTPATIENT
Start: 2021-01-10 | End: 2021-09-12

## 2021-01-10 RX ORDER — EZETIMIBE AND SIMVASTATIN 10; 40 MG/1; MG/1
TABLET ORAL
Qty: 90 TABLET | Refills: 1 | Status: SHIPPED | OUTPATIENT
Start: 2021-01-10 | End: 2021-09-12

## 2021-01-10 RX ORDER — ATENOLOL 25 MG/1
TABLET ORAL
Qty: 45 TABLET | Refills: 1 | Status: SHIPPED | OUTPATIENT
Start: 2021-01-10 | End: 2021-06-01

## 2021-01-10 RX ORDER — PANTOPRAZOLE SODIUM 40 MG/1
TABLET, DELAYED RELEASE ORAL
Qty: 90 TABLET | Refills: 1 | Status: SHIPPED | OUTPATIENT
Start: 2021-01-10 | End: 2021-06-01

## 2021-01-20 ENCOUNTER — OFFICE VISIT (OUTPATIENT)
Dept: INTERNAL MEDICINE | Facility: CLINIC | Age: 51
End: 2021-01-20

## 2021-01-20 VITALS
HEART RATE: 95 BPM | WEIGHT: 235 LBS | HEIGHT: 70 IN | TEMPERATURE: 98.4 F | OXYGEN SATURATION: 99 % | SYSTOLIC BLOOD PRESSURE: 136 MMHG | DIASTOLIC BLOOD PRESSURE: 92 MMHG | BODY MASS INDEX: 33.64 KG/M2

## 2021-01-20 DIAGNOSIS — K90.9 VITAMIN B12 DEFICIENCY DUE TO INTESTINAL MALABSORPTION: Chronic | ICD-10-CM

## 2021-01-20 DIAGNOSIS — Z00.00 PHYSICAL EXAM: Primary | ICD-10-CM

## 2021-01-20 DIAGNOSIS — E53.8 VITAMIN B12 DEFICIENCY DUE TO INTESTINAL MALABSORPTION: Chronic | ICD-10-CM

## 2021-01-20 DIAGNOSIS — E78.5 HYPERLIPIDEMIA, UNSPECIFIED HYPERLIPIDEMIA TYPE: Chronic | ICD-10-CM

## 2021-01-20 DIAGNOSIS — Z12.5 SCREENING FOR PROSTATE CANCER: ICD-10-CM

## 2021-01-20 DIAGNOSIS — Z85.060 PERSONAL HISTORY OF MALIGNANT CARCINOID TUMOR OF SMALL INTESTINE: ICD-10-CM

## 2021-01-20 DIAGNOSIS — Z23 NEED FOR SHINGLES VACCINE: ICD-10-CM

## 2021-01-20 LAB
ALBUMIN SERPL-MCNC: 4.6 G/DL (ref 3.5–5.2)
ALBUMIN/GLOB SERPL: 1.8 G/DL
ALP SERPL-CCNC: 61 U/L (ref 39–117)
ALT SERPL-CCNC: 39 U/L (ref 1–41)
APPEARANCE UR: CLEAR
AST SERPL-CCNC: 26 U/L (ref 1–40)
BILIRUB SERPL-MCNC: 0.7 MG/DL (ref 0–1.2)
BILIRUB UR QL STRIP: NEGATIVE
BUN SERPL-MCNC: 16 MG/DL (ref 6–20)
BUN/CREAT SERPL: 19.3 (ref 7–25)
CALCIUM SERPL-MCNC: 9.7 MG/DL (ref 8.6–10.5)
CHLORIDE SERPL-SCNC: 99 MMOL/L (ref 98–107)
CHOLEST SERPL-MCNC: 198 MG/DL (ref 0–200)
CO2 SERPL-SCNC: 29.6 MMOL/L (ref 22–29)
COLOR UR: YELLOW
CREAT SERPL-MCNC: 0.83 MG/DL (ref 0.76–1.27)
ERYTHROCYTE [DISTWIDTH] IN BLOOD BY AUTOMATED COUNT: 12.6 % (ref 12.3–15.4)
GLOBULIN SER CALC-MCNC: 2.5 GM/DL
GLUCOSE SERPL-MCNC: 94 MG/DL (ref 65–99)
GLUCOSE UR QL: NEGATIVE
HCT VFR BLD AUTO: 44.8 % (ref 37.5–51)
HDLC SERPL-MCNC: 60 MG/DL (ref 40–60)
HGB BLD-MCNC: 15.1 G/DL (ref 13–17.7)
HGB UR QL STRIP: NEGATIVE
KETONES UR QL STRIP: NEGATIVE
LDLC SERPL CALC-MCNC: 107 MG/DL (ref 0–100)
LEUKOCYTE ESTERASE UR QL STRIP: NEGATIVE
MCH RBC QN AUTO: 29.5 PG (ref 26.6–33)
MCHC RBC AUTO-ENTMCNC: 33.7 G/DL (ref 31.5–35.7)
MCV RBC AUTO: 87.5 FL (ref 79–97)
NITRITE UR QL STRIP: NEGATIVE
PH UR STRIP: 6 [PH] (ref 5–8)
PLATELET # BLD AUTO: 247 10*3/MM3 (ref 140–450)
POTASSIUM SERPL-SCNC: 4.4 MMOL/L (ref 3.5–5.2)
PROT SERPL-MCNC: 7.1 G/DL (ref 6–8.5)
PROT UR QL STRIP: NEGATIVE
PSA SERPL-MCNC: 0.45 NG/ML (ref 0–4)
RBC # BLD AUTO: 5.12 10*6/MM3 (ref 4.14–5.8)
SODIUM SERPL-SCNC: 137 MMOL/L (ref 136–145)
SP GR UR: 1.02 (ref 1–1.03)
TRIGL SERPL-MCNC: 183 MG/DL (ref 0–150)
TSH SERPL DL<=0.005 MIU/L-ACNC: 2.62 UIU/ML (ref 0.27–4.2)
UROBILINOGEN UR STRIP-MCNC: NORMAL MG/DL
VLDLC SERPL CALC-MCNC: 31 MG/DL (ref 5–40)
WBC # BLD AUTO: 6.53 10*3/MM3 (ref 3.4–10.8)

## 2021-01-20 PROCEDURE — 90471 IMMUNIZATION ADMIN: CPT | Performed by: NURSE PRACTITIONER

## 2021-01-20 PROCEDURE — 90750 HZV VACC RECOMBINANT IM: CPT | Performed by: NURSE PRACTITIONER

## 2021-01-20 PROCEDURE — 99396 PREV VISIT EST AGE 40-64: CPT | Performed by: NURSE PRACTITIONER

## 2021-01-20 NOTE — PROGRESS NOTES
Subjective   Bill Cano is a 50 y.o. male who presents for a physical exam.    History of Present Illness     The following portions of the patient's history were reviewed and updated as appropriate: allergies, current medications, past social history and problem list.    Past Medical History:   Diagnosis Date   • Anxiety    • Blood in stool    • Cancer (CMS/HCC)     Terminal ileum (carcinoid tumor)   • Colon polyp    • Diarrhea    • Diverticulitis of colon    • Diverticulosis    • Dizziness    • GERD (gastroesophageal reflux disease)    • Headache    • Heart palpitations     Patient takes Atenolol   • History of kidney stones    • Hypercholesterolemia    • Hypertension    • Low back pain    • Nephrolithiasis          Current Outpatient Medications:   •  atenolol (TENORMIN) 25 MG tablet, TAKE 1/2 TABLET EVERY      EVENING, Disp: 45 tablet, Rfl: 1  •  BLACK ELDERBERRY PO, Take 1,000 mg by mouth., Disp: , Rfl:   •  calcium carbonate EX (TUMS EX) 750 MG chewable tablet, Chew 750 mg As Needed for Indigestion or Heartburn., Disp: , Rfl:   •  DULoxetine (CYMBALTA) 30 MG capsule, TAKE 1 CAPSULE DAILY, Disp: 90 capsule, Rfl: 2  •  ezetimibe-simvastatin (VYTORIN) 10-40 MG per tablet, TAKE 1 TABLET DAILY, Disp: 90 tablet, Rfl: 1  •  NON FORMULARY, Vitamin D3 K2 1 spray in mouth each day, Disp: , Rfl:   •  pantoprazole (PROTONIX) 40 MG EC tablet, TAKE 1 TABLET DAILY, Disp: 90 tablet, Rfl: 1  •  vitamin B-12 (CYANOCOBALAMIN) 1000 MCG tablet, Take 1 tablet by mouth Daily., Disp: 90 tablet, Rfl: 3  •  Zinc Sulfate (ZINC 15 PO), Take  by mouth., Disp: , Rfl:     Allergies   Allergen Reactions   • Penicillins      Patient unsure had as a child       Review of Systems   Constitutional: Negative for activity change, appetite change, chills, diaphoresis, fatigue, fever and unexpected weight change.   HENT: Negative for congestion, dental problem, drooling, ear discharge, ear pain, facial swelling, hearing loss, mouth sores,  nosebleeds, postnasal drip, rhinorrhea, sinus pressure, sore throat, tinnitus and trouble swallowing.    Eyes: Positive for visual disturbance (wears glasses, no vision changes). Negative for photophobia, pain, discharge, redness and itching.   Respiratory: Negative for apnea, cough, choking, chest tightness, shortness of breath and wheezing.    Cardiovascular: Positive for palpitations (in past, resolved). Negative for chest pain and leg swelling.        No orthopnea, PND, MILLER   Gastrointestinal: Negative for abdominal pain, blood in stool, constipation, diarrhea, nausea and vomiting.   Endocrine: Negative for cold intolerance, heat intolerance, polydipsia and polyuria.   Genitourinary: Negative for decreased urine volume, dysuria, enuresis, flank pain, frequency, hematuria and urgency.   Musculoskeletal: Positive for arthralgias and back pain (chronic, waxes and wanes; lifts at work, improves with exercises). Negative for gait problem, joint swelling, myalgias, neck pain and neck stiffness.   Skin: Negative for color change and rash.        Saw derm in Oct due to bilateral itching forearms, resolved with moisturizing lotion  No hair changes, no nail changes   Allergic/Immunologic: Negative for environmental allergies, food allergies and immunocompromised state.   Neurological: Positive for headaches (wakes up with sinus headaches). Negative for dizziness, tremors, seizures, syncope, speech difficulty, weakness, light-headedness and numbness.   Hematological: Negative for adenopathy. Does not bruise/bleed easily.   Psychiatric/Behavioral: Positive for dysphoric mood (improved with Duloxetine). Negative for agitation, confusion, decreased concentration, sleep disturbance (frequent awakenings) and suicidal ideas. The patient is nervous/anxious.        Objective   Vitals:    01/20/21 0759   BP: 136/92   BP Location: Left arm   Patient Position: Sitting   Cuff Size: Adult   Pulse: 95   Temp: 98.4 °F (36.9 °C)  "  TempSrc: Oral   SpO2: 99%   Weight: 107 kg (235 lb)   Height: 179 cm (70.47\")     Body mass index is 33.27 kg/m².  Physical Exam  Vitals signs reviewed.   Constitutional:       General: He is not in acute distress.     Appearance: He is well-developed.   HENT:      Head: Normocephalic and atraumatic.      Right Ear: External ear normal.      Left Ear: External ear normal.      Nose: Nose normal.   Eyes:      General: No scleral icterus.        Right eye: No discharge.         Left eye: No discharge.      Conjunctiva/sclera: Conjunctivae normal.      Pupils: Pupils are equal, round, and reactive to light.   Neck:      Musculoskeletal: Normal range of motion and neck supple.      Thyroid: No thyromegaly.      Vascular: No JVD.      Trachea: No tracheal deviation.   Cardiovascular:      Rate and Rhythm: Normal rate and regular rhythm.      Heart sounds: Normal heart sounds. No murmur. No friction rub. No gallop.    Pulmonary:      Effort: Pulmonary effort is normal. No respiratory distress.      Breath sounds: Normal breath sounds. No wheezing or rales.   Chest:      Chest wall: No tenderness.   Abdominal:      General: Bowel sounds are normal. There is no distension.      Palpations: Abdomen is soft. There is no mass.      Tenderness: There is no abdominal tenderness. There is no guarding or rebound.      Hernia: No hernia is present.   Genitourinary:     Penis: Normal.       Prostate: Normal.      Rectum: Normal. Guaiac result negative.   Musculoskeletal: Normal range of motion.   Lymphadenopathy:      Cervical: No cervical adenopathy.   Skin:     General: Skin is warm and dry.      Findings: No erythema or rash.   Neurological:      Mental Status: He is alert and oriented to person, place, and time.      Cranial Nerves: No cranial nerve deficit.      Motor: No abnormal muscle tone.      Coordination: Coordination normal.      Deep Tendon Reflexes: Reflexes are normal and symmetric.   Psychiatric:         Behavior: " Behavior normal.         Thought Content: Thought content normal.         Judgment: Judgment normal.         Assessment/Plan   Diagnoses and all orders for this visit:    1. Physical exam (Primary)  -     CBC (No Diff)  -     Comprehensive Metabolic Panel  -     Lipid Panel  -     TSH  -     Urinalysis With Microscopic If Indicated (No Culture) - Urine, Clean Catch    2. Personal history of malignant carcinoid tumor of small intestine    3. Vitamin B12 deficiency due to intestinal malabsorption    4. Hyperlipidemia, unspecified hyperlipidemia type    5. Screening for prostate cancer  -     PSA Screen    6. Need for shingles vaccine  -     Shingrix Vaccine        Risk assessment:  He has a family hx (father) of CAD-he is currently managed on statin therapy for hyperlipidemia.    His Body mass index is 33.27 kg/m². - He has an active lifestyle, discussed the goal of weight loss.    Prevention:  He has received his annual flu vaccine. Tdap is current.  Colonoscopy is due in 2025. Nl EGD 6/17/2020.   1st Shingrix vaccine is administered today, discussed possibility of flu-like symptoms for next 1-3 days.    Discussed healthy lifestyle choices such as maintaining a balanced diet low in carbohydrates and limiting caffeine and alcohol intake.  Recommended routine exercise for bone strength and cardiovascular health.

## 2021-01-23 PROBLEM — E53.8 VITAMIN B12 DEFICIENCY DUE TO INTESTINAL MALABSORPTION: Chronic | Status: ACTIVE | Noted: 2017-03-21

## 2021-01-23 PROBLEM — Z85.060 PERSONAL HISTORY OF MALIGNANT CARCINOID TUMOR OF SMALL INTESTINE: Status: ACTIVE | Noted: 2021-01-23

## 2021-01-23 PROBLEM — Z85.060 PERSONAL HISTORY OF MALIGNANT CARCINOID TUMOR OF SMALL INTESTINE: Chronic | Status: ACTIVE | Noted: 2021-01-23

## 2021-01-23 PROBLEM — K90.9 VITAMIN B12 DEFICIENCY DUE TO INTESTINAL MALABSORPTION: Chronic | Status: ACTIVE | Noted: 2017-03-21

## 2021-03-11 ENCOUNTER — IMMUNIZATION (OUTPATIENT)
Dept: VACCINE CLINIC | Facility: HOSPITAL | Age: 51
End: 2021-03-11

## 2021-03-11 PROCEDURE — 0001A: CPT | Performed by: OBSTETRICS & GYNECOLOGY

## 2021-03-11 PROCEDURE — 91300 HC SARSCOV02 VAC 30MCG/0.3ML IM: CPT | Performed by: OBSTETRICS & GYNECOLOGY

## 2021-04-01 ENCOUNTER — IMMUNIZATION (OUTPATIENT)
Dept: VACCINE CLINIC | Facility: HOSPITAL | Age: 51
End: 2021-04-01

## 2021-04-01 PROCEDURE — 91300 HC SARSCOV02 VAC 30MCG/0.3ML IM: CPT | Performed by: OBSTETRICS & GYNECOLOGY

## 2021-04-01 PROCEDURE — 0002A: CPT | Performed by: OBSTETRICS & GYNECOLOGY

## 2021-05-30 DIAGNOSIS — R00.2 PALPITATIONS: ICD-10-CM

## 2021-05-30 DIAGNOSIS — R10.13 EPIGASTRIC PAIN: ICD-10-CM

## 2021-06-01 RX ORDER — PANTOPRAZOLE SODIUM 40 MG/1
TABLET, DELAYED RELEASE ORAL
Qty: 90 TABLET | Refills: 1 | Status: SHIPPED | OUTPATIENT
Start: 2021-06-01 | End: 2021-12-13

## 2021-06-01 RX ORDER — ATENOLOL 25 MG/1
TABLET ORAL
Qty: 45 TABLET | Refills: 1 | Status: SHIPPED | OUTPATIENT
Start: 2021-06-01 | End: 2022-02-22

## 2021-06-14 ENCOUNTER — OFFICE VISIT (OUTPATIENT)
Dept: INTERNAL MEDICINE | Facility: CLINIC | Age: 51
End: 2021-06-14

## 2021-06-14 VITALS
BODY MASS INDEX: 32.04 KG/M2 | HEART RATE: 63 BPM | WEIGHT: 223.8 LBS | SYSTOLIC BLOOD PRESSURE: 132 MMHG | TEMPERATURE: 98 F | OXYGEN SATURATION: 98 % | DIASTOLIC BLOOD PRESSURE: 72 MMHG | HEIGHT: 70 IN | RESPIRATION RATE: 16 BRPM

## 2021-06-14 DIAGNOSIS — Z23 NEED FOR SHINGLES VACCINE: ICD-10-CM

## 2021-06-14 DIAGNOSIS — K21.9 GASTROESOPHAGEAL REFLUX DISEASE WITHOUT ESOPHAGITIS: Chronic | ICD-10-CM

## 2021-06-14 DIAGNOSIS — E53.8 VITAMIN B12 DEFICIENCY DUE TO INTESTINAL MALABSORPTION: Chronic | ICD-10-CM

## 2021-06-14 DIAGNOSIS — E78.00 PURE HYPERCHOLESTEROLEMIA: Primary | Chronic | ICD-10-CM

## 2021-06-14 DIAGNOSIS — K90.9 VITAMIN B12 DEFICIENCY DUE TO INTESTINAL MALABSORPTION: Chronic | ICD-10-CM

## 2021-06-14 DIAGNOSIS — F41.1 GENERALIZED ANXIETY DISORDER: ICD-10-CM

## 2021-06-14 LAB
ALBUMIN SERPL-MCNC: 4.9 G/DL (ref 3.5–5.2)
ALBUMIN/GLOB SERPL: 3.1 G/DL
ALP SERPL-CCNC: 55 U/L (ref 39–117)
ALT SERPL-CCNC: 30 U/L (ref 1–41)
AST SERPL-CCNC: 18 U/L (ref 1–40)
BILIRUB SERPL-MCNC: 0.7 MG/DL (ref 0–1.2)
BUN SERPL-MCNC: 19 MG/DL (ref 6–20)
BUN/CREAT SERPL: 23.2 (ref 7–25)
CALCIUM SERPL-MCNC: 9.3 MG/DL (ref 8.6–10.5)
CHLORIDE SERPL-SCNC: 102 MMOL/L (ref 98–107)
CHOLEST SERPL-MCNC: 167 MG/DL (ref 0–200)
CO2 SERPL-SCNC: 27.1 MMOL/L (ref 22–29)
CREAT SERPL-MCNC: 0.82 MG/DL (ref 0.76–1.27)
GLOBULIN SER CALC-MCNC: 1.6 GM/DL
GLUCOSE SERPL-MCNC: 101 MG/DL (ref 65–99)
HDLC SERPL-MCNC: 61 MG/DL (ref 40–60)
LDLC SERPL CALC-MCNC: 88 MG/DL (ref 0–100)
POTASSIUM SERPL-SCNC: 5 MMOL/L (ref 3.5–5.2)
PROT SERPL-MCNC: 6.5 G/DL (ref 6–8.5)
SODIUM SERPL-SCNC: 138 MMOL/L (ref 136–145)
TRIGL SERPL-MCNC: 100 MG/DL (ref 0–150)
VIT B12 SERPL-MCNC: 548 PG/ML (ref 211–946)
VLDLC SERPL CALC-MCNC: 18 MG/DL (ref 5–40)

## 2021-06-14 PROCEDURE — 99214 OFFICE O/P EST MOD 30 MIN: CPT | Performed by: NURSE PRACTITIONER

## 2021-06-14 PROCEDURE — 90750 HZV VACC RECOMBINANT IM: CPT | Performed by: NURSE PRACTITIONER

## 2021-06-14 NOTE — PROGRESS NOTES
"Chief Complaint  Hypertension (6 month follow up ), Hyperlipidemia, and Anxiety    Subjective          Bill Cano presents to [facility] for f/u regarding hypercholesterolemia, B12 deficiency and anxiety.    He received the Shingrix vaccine at his appointment in January.  He did experience increased fatigue and malaise for a day without any further problems.  He reports feeling well.  He has lost 12 pounds since January by increasing his exercise (walking).  He denies chest pain or shortness of breath.      Objective   Vital Signs:   /72 (BP Location: Left arm, Patient Position: Sitting, Cuff Size: Adult)   Pulse 63   Temp 98 °F (36.7 °C) (Temporal)   Resp 16   Ht 177.8 cm (70\")   Wt 102 kg (223 lb 12.8 oz)   SpO2 98%   BMI 32.11 kg/m²     Physical Exam  Constitutional:       Appearance: He is well-developed. He is not ill-appearing.   HENT:      Head: Normocephalic.      Right Ear: Hearing, tympanic membrane and external ear normal.      Left Ear: Hearing, tympanic membrane and external ear normal.      Nose: Nose normal. No nasal deformity, mucosal edema or rhinorrhea.      Right Sinus: No maxillary sinus tenderness or frontal sinus tenderness.      Left Sinus: No maxillary sinus tenderness or frontal sinus tenderness.      Mouth/Throat:      Dentition: Normal dentition.   Eyes:      General: Lids are normal.         Right eye: No discharge.         Left eye: No discharge.      Conjunctiva/sclera: Conjunctivae normal.      Right eye: No exudate.     Left eye: No exudate.  Neck:      Thyroid: No thyroid mass or thyromegaly.      Vascular: No carotid bruit.      Trachea: Trachea normal.   Cardiovascular:      Rate and Rhythm: Regular rhythm.      Pulses: Normal pulses.      Heart sounds: Normal heart sounds. No murmur heard.     Pulmonary:      Effort: No respiratory distress.      Breath sounds: Normal breath sounds. No decreased breath sounds, wheezing, rhonchi or " rales.   Abdominal:      General: Bowel sounds are normal.      Palpations: Abdomen is soft.      Tenderness: There is no abdominal tenderness.   Musculoskeletal:      Cervical back: Normal range of motion. No edema.   Lymphadenopathy:      Head:      Right side of head: No submental, submandibular, tonsillar, preauricular, posterior auricular or occipital adenopathy.      Left side of head: No submental, submandibular, tonsillar, preauricular, posterior auricular or occipital adenopathy.   Skin:     General: Skin is warm and dry.      Nails: There is no clubbing.   Neurological:      Mental Status: He is alert.   Psychiatric:         Behavior: Behavior is cooperative.        Result Review :   The following data was reviewed by: FELIPE Nj on 06/14/2021:  Common labs    Common Labsle 9/23/20 9/23/20 9/23/20 9/23/20 1/20/21 1/20/21 1/20/21 1/20/21    0739 0739 0739 0741 0905 0905 0905 0905   Glucose  85         Glucose      94     BUN  18    16     Creatinine  0.91  0.80  0.83     eGFR Non  Am  88    98     eGFR African Am      118     Sodium  135    137     Potassium  4.2    4.4     Chloride  99    99     Calcium  9.9    9.7     Total Protein      7.1     Albumin  4.70    4.60     Total Bilirubin  0.8    0.7     Alkaline Phosphatase  56    61     AST (SGOT)  37    26     ALT (SGPT)  59 (A)    39     WBC 6.16    6.53      Hemoglobin 15.1    15.1      Hematocrit 45.8    44.8      Platelets 224    247      Total Cholesterol   185        Total Cholesterol       198    Triglycerides   153 (A)    183 (A)    HDL Cholesterol   56    60    LDL Cholesterol    98    107 (A)    PSA        0.449   (A) Abnormal value       Comments are available for some flowsheets but are not being displayed.                     Assessment and Plan    Diagnoses and all orders for this visit:    1. Pure hypercholesterolemia (Primary)  Assessment & Plan:  Lipid abnormalities are unchanged.  Pharmacotherapy as ordered.  Lipids  will be reassessed in 6 months.    Orders:  -     Lipid Panel  -     Comprehensive Metabolic Panel    2. Vitamin B12 deficiency due to intestinal malabsorption  Assessment & Plan:  Controlled on daily Vitamin B12 supplement    Orders:  -     Vitamin B12    3. Generalized anxiety disorder  Assessment & Plan:  Sx improved with daily Duloxetine      4. Gastroesophageal reflux disease without esophagitis  Assessment & Plan:  He does note intermittent dyspepsia despite taking Protonix daily (EGD 6/2020), takes Tums as needed. Denies abdominal pain.      5. Need for shingles vaccine  -     Shingrix Vaccine      Follow Up   Return in about 6 months (around 12/14/2021) for Annual physical.  Patient was given instructions and counseling regarding his condition or for health maintenance advice. Please see specific information pulled into the AVS if appropriate.

## 2021-06-14 NOTE — ASSESSMENT & PLAN NOTE
He does note intermittent dyspepsia despite taking Protonix daily (EGD 6/2020), takes Tums as needed. Denies abdominal pain.

## 2021-09-12 DIAGNOSIS — E78.5 HYPERLIPIDEMIA, UNSPECIFIED HYPERLIPIDEMIA TYPE: ICD-10-CM

## 2021-09-12 DIAGNOSIS — F41.9 ANXIETY: ICD-10-CM

## 2021-09-12 RX ORDER — EZETIMIBE AND SIMVASTATIN 10; 40 MG/1; MG/1
TABLET ORAL
Qty: 90 TABLET | Refills: 1 | Status: SHIPPED | OUTPATIENT
Start: 2021-09-12 | End: 2022-02-22

## 2021-09-12 RX ORDER — DULOXETIN HYDROCHLORIDE 30 MG/1
CAPSULE, DELAYED RELEASE ORAL
Qty: 90 CAPSULE | Refills: 2 | Status: SHIPPED | OUTPATIENT
Start: 2021-09-12 | End: 2022-05-05

## 2021-09-22 ENCOUNTER — HOSPITAL ENCOUNTER (OUTPATIENT)
Dept: PET IMAGING | Facility: HOSPITAL | Age: 51
Discharge: HOME | End: 2021-09-22
Admitting: INTERNAL MEDICINE

## 2021-09-22 ENCOUNTER — LAB (OUTPATIENT)
Dept: LAB | Facility: HOSPITAL | Age: 51
End: 2021-09-22

## 2021-09-22 DIAGNOSIS — C7A.012 MALIGNANT CARCINOID TUMOR OF ILEUM (HCC): ICD-10-CM

## 2021-09-22 LAB
ALBUMIN SERPL-MCNC: 4.5 G/DL (ref 3.5–5.2)
ALBUMIN/GLOB SERPL: 1.7 G/DL (ref 1.1–2.4)
ALP SERPL-CCNC: 61 U/L (ref 38–116)
ALT SERPL W P-5'-P-CCNC: 35 U/L (ref 0–41)
ANION GAP SERPL CALCULATED.3IONS-SCNC: 8.9 MMOL/L (ref 5–15)
AST SERPL-CCNC: 25 U/L (ref 0–40)
BASOPHILS # BLD AUTO: 0.04 10*3/MM3 (ref 0–0.2)
BASOPHILS NFR BLD AUTO: 0.8 % (ref 0–1.5)
BILIRUB SERPL-MCNC: 0.9 MG/DL (ref 0.2–1.2)
BUN SERPL-MCNC: 16 MG/DL (ref 6–20)
BUN/CREAT SERPL: 17.8 (ref 7.3–30)
CALCIUM SPEC-SCNC: 9.7 MG/DL (ref 8.5–10.2)
CHLORIDE SERPL-SCNC: 100 MMOL/L (ref 98–107)
CO2 SERPL-SCNC: 28.1 MMOL/L (ref 22–29)
CREAT BLDA-MCNC: 1 MG/DL (ref 0.6–1.3)
CREAT SERPL-MCNC: 0.9 MG/DL (ref 0.7–1.3)
DEPRECATED RDW RBC AUTO: 42.9 FL (ref 37–54)
EOSINOPHIL # BLD AUTO: 0.06 10*3/MM3 (ref 0–0.4)
EOSINOPHIL NFR BLD AUTO: 1.2 % (ref 0.3–6.2)
ERYTHROCYTE [DISTWIDTH] IN BLOOD BY AUTOMATED COUNT: 13 % (ref 12.3–15.4)
GFR SERPL CREATININE-BSD FRML MDRD: 89 ML/MIN/1.73
GLOBULIN UR ELPH-MCNC: 2.7 GM/DL (ref 1.8–3.5)
GLUCOSE SERPL-MCNC: 84 MG/DL (ref 74–124)
HCT VFR BLD AUTO: 45 % (ref 37.5–51)
HGB BLD-MCNC: 15.1 G/DL (ref 13–17.7)
IMM GRANULOCYTES # BLD AUTO: 0.01 10*3/MM3 (ref 0–0.05)
IMM GRANULOCYTES NFR BLD AUTO: 0.2 % (ref 0–0.5)
LYMPHOCYTES # BLD AUTO: 1.89 10*3/MM3 (ref 0.7–3.1)
LYMPHOCYTES NFR BLD AUTO: 36.6 % (ref 19.6–45.3)
MCH RBC QN AUTO: 30.5 PG (ref 26.6–33)
MCHC RBC AUTO-ENTMCNC: 33.6 G/DL (ref 31.5–35.7)
MCV RBC AUTO: 90.9 FL (ref 79–97)
MONOCYTES # BLD AUTO: 0.4 10*3/MM3 (ref 0.1–0.9)
MONOCYTES NFR BLD AUTO: 7.8 % (ref 5–12)
NEUTROPHILS NFR BLD AUTO: 2.76 10*3/MM3 (ref 1.7–7)
NEUTROPHILS NFR BLD AUTO: 53.4 % (ref 42.7–76)
NRBC BLD AUTO-RTO: 0 /100 WBC (ref 0–0.2)
PLATELET # BLD AUTO: 210 10*3/MM3 (ref 140–450)
PMV BLD AUTO: 9.1 FL (ref 6–12)
POTASSIUM SERPL-SCNC: 4.2 MMOL/L (ref 3.5–4.7)
PROT SERPL-MCNC: 7.2 G/DL (ref 6.3–8)
RBC # BLD AUTO: 4.95 10*6/MM3 (ref 4.14–5.8)
SODIUM SERPL-SCNC: 137 MMOL/L (ref 134–145)
VIT B12 BLD-MCNC: 842 PG/ML (ref 211–946)
WBC # BLD AUTO: 5.16 10*3/MM3 (ref 3.4–10.8)

## 2021-09-22 PROCEDURE — 0 DIATRIZOATE MEGLUMINE & SODIUM PER 1 ML: Performed by: INTERNAL MEDICINE

## 2021-09-22 PROCEDURE — 36415 COLL VENOUS BLD VENIPUNCTURE: CPT

## 2021-09-22 PROCEDURE — 80053 COMPREHEN METABOLIC PANEL: CPT

## 2021-09-22 PROCEDURE — 82565 ASSAY OF CREATININE: CPT

## 2021-09-22 PROCEDURE — 25010000002 IOPAMIDOL 61 % SOLUTION: Performed by: INTERNAL MEDICINE

## 2021-09-22 PROCEDURE — 85025 COMPLETE CBC W/AUTO DIFF WBC: CPT

## 2021-09-22 PROCEDURE — 82607 VITAMIN B-12: CPT | Performed by: INTERNAL MEDICINE

## 2021-09-22 PROCEDURE — 74177 CT ABD & PELVIS W/CONTRAST: CPT

## 2021-09-22 PROCEDURE — 71260 CT THORAX DX C+: CPT

## 2021-09-22 RX ADMIN — DIATRIZOATE MEGLUMINE AND DIATRIZOATE SODIUM 30 ML: 660; 100 LIQUID ORAL; RECTAL at 07:40

## 2021-09-22 RX ADMIN — IOPAMIDOL 100 ML: 612 INJECTION, SOLUTION INTRAVENOUS at 08:18

## 2021-09-28 LAB — CGA SERPL-MCNC: 129.6 NG/ML (ref 0–101.8)

## 2021-09-30 ENCOUNTER — APPOINTMENT (OUTPATIENT)
Dept: LAB | Facility: HOSPITAL | Age: 51
End: 2021-09-30

## 2021-09-30 ENCOUNTER — OFFICE VISIT (OUTPATIENT)
Dept: ONCOLOGY | Facility: CLINIC | Age: 51
End: 2021-09-30

## 2021-09-30 VITALS
HEART RATE: 77 BPM | HEIGHT: 70 IN | WEIGHT: 228.2 LBS | OXYGEN SATURATION: 98 % | TEMPERATURE: 97.1 F | DIASTOLIC BLOOD PRESSURE: 87 MMHG | SYSTOLIC BLOOD PRESSURE: 124 MMHG | RESPIRATION RATE: 18 BRPM | BODY MASS INDEX: 32.67 KG/M2

## 2021-09-30 DIAGNOSIS — E53.8 VITAMIN B12 DEFICIENCY DUE TO INTESTINAL MALABSORPTION: Chronic | ICD-10-CM

## 2021-09-30 DIAGNOSIS — Z85.060 PERSONAL HISTORY OF MALIGNANT CARCINOID TUMOR OF SMALL INTESTINE: Primary | Chronic | ICD-10-CM

## 2021-09-30 DIAGNOSIS — K90.9 VITAMIN B12 DEFICIENCY DUE TO INTESTINAL MALABSORPTION: Chronic | ICD-10-CM

## 2021-09-30 PROCEDURE — 99213 OFFICE O/P EST LOW 20 MIN: CPT | Performed by: INTERNAL MEDICINE

## 2021-09-30 NOTE — PROGRESS NOTES
Subjective     REASON FOR FOLLOW UP:  RESECTED CARCINOID TUMOR of ILEUM    HISTORY OF PRESENT ILLNESS:  The patient is a 51 y.o. year old male who was having some abdominal complaints which led to a colonoscopy with Dr. Vo on 8/16/2016.  He was found to have a polyp in the terminal ileum which was biopsied and showed carcinoid tumor.  He subsequently underwent a staging CT scan of the abdomen and pelvis on 8/26/2016 which showed no evidence of distant metastases but did show a 1.7 cm regional lymph node.    The patient underwent a laparoscopic right hemicolectomy with resection of the terminal ileum and appendectomy performed by Dr Bedolla on 8/29/2016.  The pathology from that specimen as noted below showed an 8 mm carcinoid tumor in the terminal ileum with 1 out of 16 lymph nodes positive for metastatic carcinoid tumor (T3 N1 M0).    He was seen for initial consultation in our office in September 2016.  It was determined that he did not require any adjuvant treatment but we set up a surveillance schedule every 6 months or so to continue to monitor for recurrence.      He returns today for his routine follow-up visit with CT scans and labs.  He seems to be getting along well.  The CT scans from 9/22/2021 showed no evidence of recurrent or metastatic disease.  His serum chromogranin A level was elevated at 129 but he tells me that he forgot to stop taking his proton pump inhibitor prior to the lab draw which probably explains the difference.    His last colonoscopy with Dr. Vo was on 6/17/2020.  The next scope would be planned in 5 years in June 2025.    History of Present Illness     Past Medical History:   Diagnosis Date   • Anxiety    • Blood in stool    • Cancer (CMS/HCC)     Terminal ileum (carcinoid tumor)   • Carcinoid tumor of ileum 8/29/2016   • Colon polyp    • Diarrhea    • Diverticulitis of colon    • Diverticulosis    • Dizziness    • GERD (gastroesophageal reflux disease)    • Headache    •  Heart palpitations     Patient takes Atenolol   • History of kidney stones    • Hypercholesterolemia    • Hypertension    • Low back pain    • Nephrolithiasis         Past Surgical History:   Procedure Laterality Date   • APPENDECTOMY     • COLON RESECTION Right 8/29/2016    Procedure: COLON RESECTION RIGHT LAPAROSCOPIC;  Surgeon: Baron Bedolla MD;  Location: [facility];  Service:    • COLONOSCOPY  05/21/2009    ih, tics    • COLONOSCOPY N/A 8/16/2016    NBIH, tics, one ileal polyp in terminal ileum, carcinoid tumor, TA w/low grade dysplasia   • COLONOSCOPY N/A 8/31/2017    - Patent side-to-side ileo-colonic anastomosis, characterized by inflammation, ulceration, visible sutures,congestion, edema, erosion and erythema- Diverticulosis in the sigmoid colon, in the descending colon, at the splenic flexure, in the transverse colon and atthe hepatic flexure.   • SMALL INTESTINE SURGERY  8/26/16    Bowel resection carinoid tumor   • TONSILLECTOMY      CHILDHOOD        ALLERGIES:    Allergies   Allergen Reactions   • Penicillins      Patient unsure had as a child         Review of Systems   Constitutional: Negative for activity change, appetite change, fatigue, fever and unexpected weight change.   HENT: Negative for hearing loss, nosebleeds, trouble swallowing and voice change.    Eyes: Negative for visual disturbance.   Respiratory: Negative for cough, shortness of breath and wheezing.    Cardiovascular: Negative for chest pain and palpitations.   Gastrointestinal: Negative for abdominal pain, diarrhea, nausea and vomiting.        Acid reflux   Genitourinary: Negative for difficulty urinating, frequency, hematuria and urgency.   Musculoskeletal: Negative for back pain and neck pain.   Skin: Negative for rash.   Neurological: Negative for dizziness, seizures, syncope and headaches.   Hematological: Negative for adenopathy. Does not bruise/bleed easily.   Psychiatric/Behavioral: Negative for behavioral problems. The  "patient is not nervous/anxious.        Objective     Vitals:    09/30/21 1329   BP: 124/87   Pulse: 77   Resp: 18   Temp: 97.1 °F (36.2 °C)   TempSrc: Temporal   SpO2: 98%   Weight: 104 kg (228 lb 3.2 oz)   Height: 177.8 cm (70\")   PainSc: 0-No pain     Current Status 9/30/2021   ECOG score 0       Physical Exam   Constitutional: He is oriented to person, place, and time. He appears well-developed. No distress.   HENT:   Head: Normocephalic.   Eyes: Pupils are equal, round, and reactive to light. Conjunctivae are normal. No scleral icterus.   Neck: No JVD present. No thyromegaly present.   Cardiovascular: Normal rate and regular rhythm. Exam reveals no gallop and no friction rub.   No murmur heard.  Pulmonary/Chest: Effort normal and breath sounds normal. He has no wheezes. He has no rales.   Abdominal: Soft. He exhibits no distension and no mass. There is no abdominal tenderness.   Healed surgical scar RLQ.   Musculoskeletal: Normal range of motion. No deformity.   Lymphadenopathy:     He has no cervical adenopathy.   Neurological: He is alert and oriented to person, place, and time. He has normal reflexes. No cranial nerve deficit.   Skin: Skin is warm and dry. No rash noted. No erythema.   Psychiatric: His behavior is normal. Judgment normal.   I have reexamined the patient and the results are consistent with the previously documented exam. Tucker Buenrostro MD       RECENT LABS:  Hematology WBC   Date Value Ref Range Status   09/22/2021 5.16 3.40 - 10.80 10*3/mm3 Final   01/20/2021 6.53 3.40 - 10.80 10*3/mm3 Final     RBC   Date Value Ref Range Status   09/22/2021 4.95 4.14 - 5.80 10*6/mm3 Final   01/20/2021 5.12 4.14 - 5.80 10*6/mm3 Final     Hemoglobin   Date Value Ref Range Status   09/22/2021 15.1 13.0 - 17.7 g/dL Final     Hematocrit   Date Value Ref Range Status   09/22/2021 45.0 37.5 - 51.0 % Final     Platelets   Date Value Ref Range Status   09/22/2021 210 140 - 450 10*3/mm3 Final        Lab Results "   Component Value Date    NCQHCOYF02 842 09/22/2021     CHROMOGRANIN A 129.6High    0.0 - 101.8 ng/mL      Lab Results   Component Value Date    GLUCOSE 84 09/22/2021    BUN 16 09/22/2021    CREATININE 1.00 09/22/2021    EGFRIFNONA 89 09/22/2021    EGFRIFAFRI 120 06/14/2021    BCR 17.8 09/22/2021    K 4.2 09/22/2021    CO2 28.1 09/22/2021    CALCIUM 9.7 09/22/2021    PROTENTOTREF 6.5 06/14/2021    ALBUMIN 4.50 09/22/2021    LABIL2 3.1 06/14/2021    AST 25 09/22/2021    ALT 35 09/22/2021     CT CHEST, ABDOMEN, AND PELVIS WITH IV CONTRAST 9/22/2021  IMPRESSION:  Tiny nonobstructing left renal calculus, unchanged since  09/23/2020. Otherwise unremarkable CT imaging of the chest, abdomen and  pelvis. There is no evidence of metastatic disease.      Assessment/Plan     1.  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  Patient has no evidence of distant metastatic disease on recent scans now 5 years out from initial diagnosis.  The serum chromogranin A level    2.  Resection of the terminal ileum putting him at risk for future B12 deficiency.  He has now on daily B12 replacement and his B12 level is remaining within normal limits.    Plan  1.  I discussed the results of the labs and CT scans from one week ago with the patient.  I think he is doing great now 5 years out from his surgery.  His chromogranin A level was elevated but he forgot to stop taking his proton pump inhibitor prior to the lab draw.  2.  Continue B12 tablets as maintenance 1000 µg by mouth daily.   3.  He otherwise will return for M.D. follow-up in 12 months and again will have labs performed 1 week prior to visit.  That he is 5 years out from his surgery we decided to order scans on an as-needed basis from here on.  Certainly if he has any new symptoms or significant change in his labs we will order repeat scans at that time.  4.  He will follow-up with Dr. Vo for further  surveillance colonoscopies.  Next scope will be due in June 2025

## 2021-12-11 DIAGNOSIS — R10.13 EPIGASTRIC PAIN: ICD-10-CM

## 2021-12-13 RX ORDER — PANTOPRAZOLE SODIUM 40 MG/1
TABLET, DELAYED RELEASE ORAL
Qty: 90 TABLET | Refills: 1 | Status: SHIPPED | OUTPATIENT
Start: 2021-12-13 | End: 2022-05-05

## 2022-02-18 NOTE — PROGRESS NOTES
Subjective     REASON FOR FOLLOW UP:  RESECTED CARCINOID TUMOR of ILEUM    HISTORY OF PRESENT ILLNESS:  The patient is a 48 y.o. year old male who was having some abdominal complaints which led to a colonoscopy with Dr. Vo on 8/16/2016.  He was found to have a polyp in the terminal ileum which was biopsied and showed carcinoid tumor.  He subsequently underwent a staging CT scan of the abdomen and pelvis on 8/26/2016 which showed no evidence of distant metastases but did show a 1.7 cm regional lymph node.    The patient underwent a laparoscopic right hemicolectomy with resection of the terminal ileum and appendectomy performed by Dr Bedolla on 8/29/2016.  The pathology from that specimen as noted below showed an 8 mm carcinoid tumor in the terminal ileum with 1 out of 16 lymph nodes positive for metastatic carcinoid tumor (T3 N1 M0).    He was seen for initial consultation in our office in September 2016.  It was determined that he did not require any adjuvant treatment but we set up a surveillance schedule every 6 months or so over the next 2 years to continue to monitor for recurrence.      He returns today for his 18 month follow-up visit with CT scans and labs.  He seems to be getting along well.  The CT scan from 3/13/2018 showed no evidence of recurrent or metastatic disease.  His serum chromogranin A level was slightly elevated with a value of 6.  This did however represent a significant change from his previous chromogranin level which has been around 1.  He reports that he has been taking some proton pump inhibitor therapy for gastroesophageal reflux.  This could potentially effect the lab result and we instructed him to hold those medications for 2 weeks prior to future labs.    His next colonoscopy with Dr. Vo will be in August 2020.    History of Present Illness     Past Medical History:   Diagnosis Date   • Anxiety    • Blood in stool    • Cancer     Terminal ileum   • Colon polyp    • Diarrhea     • Dizziness    • Heart palpitations     Patient takes Atenolol   • History of kidney stones    • Hypercholesterolemia    • Hypertension    • Nephrolithiasis         Past Surgical History:   Procedure Laterality Date   • COLON RESECTION Right 8/29/2016    Procedure: COLON RESECTION RIGHT LAPAROSCOPIC;  Surgeon: Baron Bedolla MD;  Location: [facility] MAIN OR;  Service:    • COLONOSCOPY  05/21/2009    ih, tics    • COLONOSCOPY N/A 8/16/2016    Procedure: COLONOSCOPYWITH BIOPSIES AND COLD POLYPECTOMY TIMES 1 ;  Surgeon: Will Vo MD;  Location: [facility] ENDOSCOPY;  Service:    • COLONOSCOPY N/A 8/31/2017    Procedure: COLONOSCOPY TO FRANCIS TERMINAL ILEUM ;  Surgeon: Will Vo MD;  Location: [facility] ENDOSCOPY;  Service:    • TONSILLECTOMY      CHILDHOOD        ALLERGIES:    Allergies   Allergen Reactions   • Penicillins      Patient unsure had as a child         Review of Systems   Constitutional: Negative for activity change, appetite change, fatigue, fever and unexpected weight change.   HENT: Negative for hearing loss, nosebleeds, trouble swallowing and voice change.    Eyes: Negative for visual disturbance.   Respiratory: Negative for cough, shortness of breath and wheezing.    Cardiovascular: Negative for chest pain and palpitations.   Gastrointestinal: Negative for abdominal pain, diarrhea, nausea and vomiting.        Acid reflux   Genitourinary: Negative for difficulty urinating, frequency, hematuria and urgency.   Musculoskeletal: Negative for back pain and neck pain.   Skin: Negative for rash.   Neurological: Negative for dizziness, seizures, syncope and headaches.   Hematological: Negative for adenopathy. Does not bruise/bleed easily.   Psychiatric/Behavioral: Negative for behavioral problems. The patient is not nervous/anxious.         Objective     Vitals:    03/26/18 0930   BP: 132/92   Pulse: 69   Resp: 14   Temp: 97.7 °F (36.5 °C)   TempSrc: Oral   SpO2: 99%   Weight: 103 kg (227 lb 9.6 oz)  "  Height: 181.4 cm (71.42\")  Comment: new ht w/shoes   PainSc: 0-No pain     Current Status 3/26/2018   ECOG score 0       Physical Exam   Constitutional: He is oriented to person, place, and time. He appears well-developed and well-nourished. No distress.   HENT:   Head: Normocephalic.   Eyes: Conjunctivae and EOM are normal. Pupils are equal, round, and reactive to light. No scleral icterus.   Neck: Normal range of motion. Neck supple. No JVD present. No thyromegaly present.   Cardiovascular: Normal rate and regular rhythm.  Exam reveals no gallop and no friction rub.    No murmur heard.  Pulmonary/Chest: Effort normal and breath sounds normal. He has no wheezes. He has no rales.   Abdominal: Soft. He exhibits no distension and no mass. There is no tenderness.   Healed surgical scar RLQ.   Musculoskeletal: Normal range of motion. He exhibits no edema or deformity.   Lymphadenopathy:     He has no cervical adenopathy.   Neurological: He is alert and oriented to person, place, and time. He has normal reflexes. No cranial nerve deficit.   Skin: Skin is warm and dry. No rash noted. No erythema.   Psychiatric: He has a normal mood and affect. His behavior is normal. Judgment normal.         RECENT LABS:  Hematology WBC   Date Value Ref Range Status   03/13/2018 6.02 4.50 - 10.70 10*3/mm3 Final     RBC   Date Value Ref Range Status   03/13/2018 4.99 4.60 - 6.00 10*6/mm3 Final     Hemoglobin   Date Value Ref Range Status   03/13/2018 15.1 13.7 - 17.6 g/dL Final     Hematocrit   Date Value Ref Range Status   03/13/2018 45.1 40.4 - 52.2 % Final     Platelets   Date Value Ref Range Status   03/13/2018 200 140 - 500 10*3/mm3 Final        Lab Results   Component Value Date    PXAHZIPB42 560 09/06/2017     3/13/2018  Chromogranin A 0 - 5 nmol/L 6       Lab Results   Component Value Date    GLUCOSE 110 (H) 03/13/2018    BUN 9 03/13/2018    CREATININE 0.90 03/13/2018    EGFRIFNONA 90 03/13/2018    EGFRIFAFRI  09/16/2016      " Comment:      <15 Indicative of kidney failure.    BCR 10.0 03/13/2018    K 4.2 03/13/2018    CO2 29.7 (H) 03/13/2018    CALCIUM 9.6 03/13/2018    ALBUMIN 4.90 03/13/2018    LABIL2 1.9 03/13/2018    AST 35 03/13/2018    ALT 54 (H) 03/13/2018     CT A/P 3/13/2018  COMPARISON: 09/06/2017 and priors.     FINDINGS: Attenuation of the liver is unremarkable. No focal hepatic  lesions or intrahepatic biliary dilatation. The spleen, gallbladder and  the pancreas are normal. Bilateral adrenal glands are normal. Both  kidneys are normal in size and attenuation. Punctate left renal calculus  within the upper pole. No hydronephrosis. The urinary bladder is  partially distended and normal. Colonic diverticulosis is present.  Changes of right hemicolectomy with an ileocolonic anastomosis that  appears unremarkable. No pathologic abdominal lymphadenopathy is  identified. L4-5 degenerative disc disease is present.      IMPRESSION:  No convincing evidence of recurrence of metastatic disease  within the abdomen and pelvis.    Images personally reviewed    Assessment/Plan     1.  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  Patient has no evidence of distant metastatic disease on recent scans.  The serum chromogranin A level had increased to a value of 6 from a previous level of 1.   2.  Resection of the terminal ileum putting him at risk for future B12 deficiency.    Plan  1.  I discussed the results of the labs and CT scans from one week ago with the patient.  We are certainly little concerned with the elevation of his serum chromogranin a level and feel that we may need to repeat this lab test in 3 months rather than waiting 6 months.  If the serum chromogranin A level continues to increase we may need to contact patient and schedule an octreotide scan prior to his M.D. follow-up in 6 months.  2.  Continue B12 tablets as maintenance 1000 µg by mouth daily.   3.   He otherwise will return for M.D. follow-up in 6 months and again will have labs and a CT scan performed 1 week prior to visit.  4.  He will follow-up with Dr. Vo for further surveillance colonoscopies.                  Yes - the patient is able to be screened

## 2022-02-21 DIAGNOSIS — R00.2 PALPITATIONS: ICD-10-CM

## 2022-02-21 DIAGNOSIS — E78.5 HYPERLIPIDEMIA, UNSPECIFIED HYPERLIPIDEMIA TYPE: ICD-10-CM

## 2022-02-22 RX ORDER — EZETIMIBE AND SIMVASTATIN 10; 40 MG/1; MG/1
TABLET ORAL
Qty: 90 TABLET | Refills: 1 | Status: SHIPPED | OUTPATIENT
Start: 2022-02-22 | End: 2022-07-18

## 2022-02-22 RX ORDER — ATENOLOL 25 MG/1
TABLET ORAL
Qty: 45 TABLET | Refills: 1 | Status: SHIPPED | OUTPATIENT
Start: 2022-02-22 | End: 2022-07-18

## 2022-04-21 ENCOUNTER — OFFICE VISIT (OUTPATIENT)
Dept: INTERNAL MEDICINE | Facility: CLINIC | Age: 52
End: 2022-04-21

## 2022-04-21 VITALS
OXYGEN SATURATION: 98 % | HEART RATE: 94 BPM | DIASTOLIC BLOOD PRESSURE: 80 MMHG | BODY MASS INDEX: 31.92 KG/M2 | WEIGHT: 223 LBS | TEMPERATURE: 97.8 F | HEIGHT: 70 IN | SYSTOLIC BLOOD PRESSURE: 124 MMHG

## 2022-04-21 DIAGNOSIS — Z00.00 PHYSICAL EXAM: Primary | ICD-10-CM

## 2022-04-21 DIAGNOSIS — E78.00 PURE HYPERCHOLESTEROLEMIA: Chronic | ICD-10-CM

## 2022-04-21 DIAGNOSIS — K90.9 VITAMIN B12 DEFICIENCY DUE TO INTESTINAL MALABSORPTION: Chronic | ICD-10-CM

## 2022-04-21 DIAGNOSIS — E53.8 VITAMIN B12 DEFICIENCY DUE TO INTESTINAL MALABSORPTION: Chronic | ICD-10-CM

## 2022-04-21 DIAGNOSIS — F41.1 GENERALIZED ANXIETY DISORDER: Chronic | ICD-10-CM

## 2022-04-21 DIAGNOSIS — Z12.5 SCREENING FOR PROSTATE CANCER: ICD-10-CM

## 2022-04-21 DIAGNOSIS — K21.9 GASTROESOPHAGEAL REFLUX DISEASE WITHOUT ESOPHAGITIS: Chronic | ICD-10-CM

## 2022-04-21 DIAGNOSIS — Z12.11 SCREENING FOR COLON CANCER: ICD-10-CM

## 2022-04-21 PROBLEM — Z87.442 HISTORY OF KIDNEY STONES: Chronic | Status: ACTIVE | Noted: 2020-01-17

## 2022-04-21 LAB — HEMOCCULT STL QL IA: NEGATIVE

## 2022-04-21 PROCEDURE — 99396 PREV VISIT EST AGE 40-64: CPT | Performed by: NURSE PRACTITIONER

## 2022-04-21 PROCEDURE — 82274 ASSAY TEST FOR BLOOD FECAL: CPT | Performed by: NURSE PRACTITIONER

## 2022-04-21 NOTE — ASSESSMENT & PLAN NOTE
He denies myalgias with Vytorin which he will continue along with a low-fat, low-cholesterol diet.

## 2022-04-21 NOTE — ASSESSMENT & PLAN NOTE
His symptoms are well controlled on Protonix which he will continue daily.  He does note substernal chest pressure which is worse in the spring, symptoms due to reflux vs. allergies (has started Zyrtec).   EGD was done in 2020 due to symptoms which did not show any acute abnormalities.  Continue to monitor and consider further evaluation if symptoms persist.

## 2022-04-21 NOTE — PROGRESS NOTES
Subjective   Bill Cano is a 52 y.o. male who presents for a physical exam.    He complains of intermittent substernal chest pressure which has been recurrent every spring.  He does state symptoms are exacerbated by alcohol use.  He drinks on the weekends and symptoms worsen but improved throughout the week.  He also notes increased postnasal drainage for which she is started Zyrtec.       The following portions of the patient's history were reviewed and updated as appropriate: allergies, current medications, past social history and problem list.    Past Medical History:   Diagnosis Date   • Anxiety    • Blood in stool    • Cancer (HCC)     Terminal ileum (carcinoid tumor)   • Carcinoid tumor of ileum 8/29/2016   • Colon polyp    • Diarrhea    • Diverticulitis of colon    • Diverticulosis    • Dizziness    • GERD (gastroesophageal reflux disease)    • Headache    • Heart palpitations     Patient takes Atenolol   • History of kidney stones    • Hypercholesterolemia    • Hypertension    • Low back pain    • Nephrolithiasis          Current Outpatient Medications:   •  atenolol (TENORMIN) 25 MG tablet, TAKE 1/2 TABLET EVERY      EVENING, Disp: 45 tablet, Rfl: 1  •  calcium carbonate EX (TUMS EX) 750 MG chewable tablet, Chew 750 mg As Needed for Indigestion or Heartburn., Disp: , Rfl:   •  DULoxetine (CYMBALTA) 30 MG capsule, TAKE 1 CAPSULE DAILY, Disp: 90 capsule, Rfl: 2  •  ezetimibe-simvastatin (VYTORIN) 10-40 MG per tablet, TAKE 1 TABLET DAILY, Disp: 90 tablet, Rfl: 1  •  pantoprazole (PROTONIX) 40 MG EC tablet, TAKE 1 TABLET DAILY, Disp: 90 tablet, Rfl: 1  •  vitamin B-12 (CYANOCOBALAMIN) 1000 MCG tablet, Take 1 tablet by mouth Daily., Disp: 90 tablet, Rfl: 3    Allergies   Allergen Reactions   • Penicillins      Patient unsure had as a child       Review of Systems   Constitutional: Negative for activity change, appetite change, chills, diaphoresis, fatigue (wears glasses), fever and unexpected weight change.    HENT: Negative for congestion, dental problem, drooling, ear discharge, ear pain, facial swelling, hearing loss, mouth sores, nosebleeds, postnasal drip, rhinorrhea, sinus pressure, sore throat, tinnitus and trouble swallowing.    Eyes: Positive for visual disturbance. Negative for photophobia, pain, discharge, redness and itching.   Respiratory: Negative for apnea, cough, choking, chest tightness, shortness of breath and wheezing.    Cardiovascular: Negative for chest pain, palpitations and leg swelling.        No orthopnea, PND, MILLER   Gastrointestinal: Negative for abdominal pain, blood in stool, constipation, diarrhea, nausea and vomiting.   Endocrine: Negative for cold intolerance, heat intolerance, polydipsia and polyuria.   Genitourinary: Negative for decreased urine volume, dysuria, enuresis, flank pain, frequency, hematuria and urgency.        +hx kidney stones, denies symptoms   Musculoskeletal: Positive for arthralgias and back pain ( He complains of low back pain and stiffness which radiates into legs, pain is intermittent but stable). Negative for gait problem, joint swelling, myalgias, neck pain and neck stiffness.   Skin: Negative for color change and rash.        No hair changes, no nail changes  Notes drier skin   Allergic/Immunologic: Negative for environmental allergies, food allergies and immunocompromised state.   Neurological: Negative for dizziness, tremors, seizures, syncope, speech difficulty, weakness, light-headedness, numbness and headaches.   Hematological: Negative for adenopathy. Does not bruise/bleed easily.   Psychiatric/Behavioral: Negative for agitation, confusion, decreased concentration, dysphoric mood, sleep disturbance and suicidal ideas. The patient is not nervous/anxious.        Objective   Vitals:    04/21/22 0920   BP: 124/80   BP Location: Left arm   Patient Position: Sitting   Cuff Size: Adult   Pulse: 94   Temp: 97.8 °F (36.6 °C)   TempSrc: Temporal   SpO2: 98%  "  Weight: 101 kg (223 lb)   Height: 177.8 cm (70\")     Body mass index is 32 kg/m².  Physical Exam  Vitals reviewed.   Constitutional:       General: He is not in acute distress.     Appearance: He is well-developed.   HENT:      Head: Normocephalic and atraumatic.      Right Ear: External ear normal.      Left Ear: External ear normal.      Nose: Nose normal.   Eyes:      General: No scleral icterus.        Right eye: No discharge.         Left eye: No discharge.      Conjunctiva/sclera: Conjunctivae normal.      Pupils: Pupils are equal, round, and reactive to light.   Neck:      Thyroid: No thyromegaly.      Vascular: No JVD.      Trachea: No tracheal deviation.   Cardiovascular:      Rate and Rhythm: Normal rate and regular rhythm.      Heart sounds: Normal heart sounds. No murmur heard.    No friction rub. No gallop.   Pulmonary:      Effort: Pulmonary effort is normal. No respiratory distress.      Breath sounds: Normal breath sounds. No wheezing or rales.   Chest:      Chest wall: No tenderness.   Abdominal:      General: Bowel sounds are normal. There is no distension.      Palpations: Abdomen is soft. There is no mass.      Tenderness: There is no abdominal tenderness. There is no guarding or rebound.      Hernia: No hernia is present.   Genitourinary:     Penis: Normal.       Prostate: Normal.      Rectum: Normal. Guaiac result negative.   Musculoskeletal:         General: Normal range of motion.      Cervical back: Normal range of motion and neck supple.   Lymphadenopathy:      Cervical: No cervical adenopathy.   Skin:     General: Skin is warm and dry.      Findings: No erythema or rash.   Neurological:      Mental Status: He is alert and oriented to person, place, and time.      Cranial Nerves: No cranial nerve deficit.      Motor: No abnormal muscle tone.      Coordination: Coordination normal.      Deep Tendon Reflexes: Reflexes are normal and symmetric.   Psychiatric:         Behavior: Behavior " normal.         Thought Content: Thought content normal.         Judgment: Judgment normal.         Assessment/Plan   Diagnoses and all orders for this visit:    1. Physical exam (Primary)  -     CBC (No Diff)  -     Comprehensive Metabolic Panel  -     Lipid Panel  -     TSH  -     Urinalysis With Microscopic If Indicated (No Culture) - Urine, Clean Catch    2. Gastroesophageal reflux disease without esophagitis  Assessment & Plan:  His symptoms are well controlled on Protonix which he will continue daily.  He does note substernal chest pressure which is worse in the spring, symptoms due to reflux vs. allergies (has started Zyrtec).   EGD was done in 2020 due to symptoms which did not show any acute abnormalities.  Continue to monitor and consider further evaluation if symptoms persist.      3. Vitamin B12 deficiency due to intestinal malabsorption  Assessment & Plan:  He is currently managed on a B12 supplement, recheck level    Orders:  -     Vitamin B12    4. Pure hypercholesterolemia  Assessment & Plan:  He denies myalgias with Vytorin which he will continue along with a low-fat, low-cholesterol diet.      5. Generalized anxiety disorder  Assessment & Plan:  Symptoms stable with daily duloxetine, continue to monitor      6. Screening for prostate cancer  -     PSA Screen    7. Screening for colon cancer  -     POC FECAL OCCULT BLOOD BY IMMUNOASSAY      Risk assessment:  He has a family hx (father) of CAD  check fasting lipid panel today.  He has managed on statin therapy.  His Body mass index is 32 kg/m². - He remains active and tries to follow a low-fat, low-cholesterol diet.    Prevention:  He has received his annual flu vaccine. Tdap is current.  Colonoscopy is due in 2025.   He has received the Shingrix vaccine series.  Discussed second COVID-19 booster which he plans to receive prior to his trip to [place].    Discussed healthy lifestyle choices such as maintaining a balanced diet low in carbohydrates and  limiting caffeine and alcohol intake.  Recommended routine exercise for bone strength and cardiovascular health.

## 2022-04-22 LAB
ALBUMIN SERPL-MCNC: 4.7 G/DL (ref 3.8–4.9)
ALBUMIN/GLOB SERPL: 2 {RATIO} (ref 1.2–2.2)
ALP SERPL-CCNC: 60 IU/L (ref 44–121)
ALT SERPL-CCNC: 29 IU/L (ref 0–44)
APPEARANCE UR: CLEAR
AST SERPL-CCNC: 21 IU/L (ref 0–40)
BILIRUB SERPL-MCNC: 0.9 MG/DL (ref 0–1.2)
BILIRUB UR QL STRIP: NEGATIVE
BUN SERPL-MCNC: 18 MG/DL (ref 6–24)
BUN/CREAT SERPL: 18 (ref 9–20)
CALCIUM SERPL-MCNC: 10.1 MG/DL (ref 8.7–10.2)
CHLORIDE SERPL-SCNC: 96 MMOL/L (ref 96–106)
CHOLEST SERPL-MCNC: 191 MG/DL (ref 100–199)
CO2 SERPL-SCNC: 24 MMOL/L (ref 20–29)
COLOR UR: YELLOW
CREAT SERPL-MCNC: 1 MG/DL (ref 0.76–1.27)
EGFRCR SERPLBLD CKD-EPI 2021: 90 ML/MIN/1.73
ERYTHROCYTE [DISTWIDTH] IN BLOOD BY AUTOMATED COUNT: 12.9 % (ref 11.6–15.4)
GLOBULIN SER CALC-MCNC: 2.4 G/DL (ref 1.5–4.5)
GLUCOSE SERPL-MCNC: 96 MG/DL (ref 65–99)
GLUCOSE UR QL STRIP: NEGATIVE
HCT VFR BLD AUTO: 46.5 % (ref 37.5–51)
HDLC SERPL-MCNC: 61 MG/DL
HGB BLD-MCNC: 15.8 G/DL (ref 13–17.7)
HGB UR QL STRIP: NEGATIVE
KETONES UR QL STRIP: NEGATIVE
LDLC SERPL CALC-MCNC: 108 MG/DL (ref 0–99)
LEUKOCYTE ESTERASE UR QL STRIP: NEGATIVE
MCH RBC QN AUTO: 29.9 PG (ref 26.6–33)
MCHC RBC AUTO-ENTMCNC: 34 G/DL (ref 31.5–35.7)
MCV RBC AUTO: 88 FL (ref 79–97)
MICRO URNS: NORMAL
NITRITE UR QL STRIP: NEGATIVE
PH UR STRIP: 6 [PH] (ref 5–7.5)
PLATELET # BLD AUTO: 232 X10E3/UL (ref 150–450)
POTASSIUM SERPL-SCNC: 4.6 MMOL/L (ref 3.5–5.2)
PROT SERPL-MCNC: 7.1 G/DL (ref 6–8.5)
PROT UR QL STRIP: NEGATIVE
PSA SERPL-MCNC: 0.5 NG/ML (ref 0–4)
RBC # BLD AUTO: 5.29 X10E6/UL (ref 4.14–5.8)
SODIUM SERPL-SCNC: 136 MMOL/L (ref 134–144)
SP GR UR STRIP: 1.02 (ref 1–1.03)
TRIGL SERPL-MCNC: 127 MG/DL (ref 0–149)
TSH SERPL DL<=0.005 MIU/L-ACNC: 2.23 UIU/ML (ref 0.45–4.5)
UROBILINOGEN UR STRIP-MCNC: 0.2 MG/DL (ref 0.2–1)
VIT B12 SERPL-MCNC: 497 PG/ML (ref 232–1245)
VLDLC SERPL CALC-MCNC: 22 MG/DL (ref 5–40)
WBC # BLD AUTO: 5.7 X10E3/UL (ref 3.4–10.8)

## 2022-04-24 NOTE — PROGRESS NOTES
Naldo, your recent labs show a normal hemoglobin and hematocrit without anemia.  Kidney and liver function as well as electrolytes are normal.  Glucose is normal. Cholesterol is good-continue simvastatin-ezetimibe along with a low-fat, low-cholesterol diet.  Thyroid function is normal.  PSA, screen for prostate cancer, is normal and stable from last year.  Vitamin B12 level is good-continue daily supplement.  Take care and I will see you in October.

## 2022-05-04 DIAGNOSIS — F41.9 ANXIETY: ICD-10-CM

## 2022-05-04 DIAGNOSIS — R10.13 EPIGASTRIC PAIN: ICD-10-CM

## 2022-05-05 RX ORDER — DULOXETIN HYDROCHLORIDE 30 MG/1
CAPSULE, DELAYED RELEASE ORAL
Qty: 90 CAPSULE | Refills: 2 | Status: SHIPPED | OUTPATIENT
Start: 2022-05-05 | End: 2023-03-06

## 2022-05-05 RX ORDER — PANTOPRAZOLE SODIUM 40 MG/1
TABLET, DELAYED RELEASE ORAL
Qty: 90 TABLET | Refills: 1 | Status: SHIPPED | OUTPATIENT
Start: 2022-05-05 | End: 2022-12-05

## 2022-07-16 DIAGNOSIS — E78.5 HYPERLIPIDEMIA, UNSPECIFIED HYPERLIPIDEMIA TYPE: ICD-10-CM

## 2022-07-16 DIAGNOSIS — R00.2 PALPITATIONS: ICD-10-CM

## 2022-07-18 RX ORDER — ATENOLOL 25 MG/1
TABLET ORAL
Qty: 45 TABLET | Refills: 1 | Status: SHIPPED | OUTPATIENT
Start: 2022-07-18 | End: 2022-12-05

## 2022-07-18 RX ORDER — EZETIMIBE AND SIMVASTATIN 10; 40 MG/1; MG/1
TABLET ORAL
Qty: 90 TABLET | Refills: 1 | Status: SHIPPED | OUTPATIENT
Start: 2022-07-18 | End: 2023-03-06

## 2022-08-12 ENCOUNTER — TELEPHONE (OUTPATIENT)
Dept: ONCOLOGY | Facility: CLINIC | Age: 52
End: 2022-08-12

## 2022-08-12 NOTE — TELEPHONE ENCOUNTER
Caller: FRED    Relationship to patient: Self    Best call back number: [phone]    Chief complaint: CANC. APPTS., DUE TO BEING IN [place]    Type of visit: VITALS ONLY/FOLLOW UP    Requested date: FOLLOW ING WEEK AT 3 OR AFTER    If rescheduling, when is the original appointment: 9/29/2022

## 2022-10-06 ENCOUNTER — LAB (OUTPATIENT)
Dept: LAB | Facility: HOSPITAL | Age: 52
End: 2022-10-06

## 2022-10-06 DIAGNOSIS — E53.8 VITAMIN B12 DEFICIENCY DUE TO INTESTINAL MALABSORPTION: Primary | ICD-10-CM

## 2022-10-06 DIAGNOSIS — K90.9 VITAMIN B12 DEFICIENCY DUE TO INTESTINAL MALABSORPTION: Primary | ICD-10-CM

## 2022-10-06 LAB
ALBUMIN SERPL-MCNC: 4.4 G/DL (ref 3.5–5.2)
ALBUMIN/GLOB SERPL: 1.8 G/DL (ref 1.1–2.4)
ALP SERPL-CCNC: 61 U/L (ref 38–116)
ALT SERPL W P-5'-P-CCNC: 42 U/L (ref 0–41)
ANION GAP SERPL CALCULATED.3IONS-SCNC: 9.9 MMOL/L (ref 5–15)
AST SERPL-CCNC: 27 U/L (ref 0–40)
BASOPHILS # BLD AUTO: 0.04 10*3/MM3 (ref 0–0.2)
BASOPHILS NFR BLD AUTO: 0.6 % (ref 0–1.5)
BILIRUB SERPL-MCNC: 0.7 MG/DL (ref 0.2–1.2)
BUN SERPL-MCNC: 19 MG/DL (ref 6–20)
BUN/CREAT SERPL: 19.4 (ref 7.3–30)
CALCIUM SPEC-SCNC: 9.5 MG/DL (ref 8.5–10.2)
CHLORIDE SERPL-SCNC: 102 MMOL/L (ref 98–107)
CO2 SERPL-SCNC: 27.1 MMOL/L (ref 22–29)
CREAT SERPL-MCNC: 0.98 MG/DL (ref 0.7–1.3)
DEPRECATED RDW RBC AUTO: 42.6 FL (ref 37–54)
EGFRCR SERPLBLD CKD-EPI 2021: 92.2 ML/MIN/1.73
EOSINOPHIL # BLD AUTO: 0.08 10*3/MM3 (ref 0–0.4)
EOSINOPHIL NFR BLD AUTO: 1.3 % (ref 0.3–6.2)
ERYTHROCYTE [DISTWIDTH] IN BLOOD BY AUTOMATED COUNT: 12.8 % (ref 12.3–15.4)
GLOBULIN UR ELPH-MCNC: 2.5 GM/DL (ref 1.8–3.5)
GLUCOSE SERPL-MCNC: 94 MG/DL (ref 74–124)
HCT VFR BLD AUTO: 43.2 % (ref 37.5–51)
HGB BLD-MCNC: 14.2 G/DL (ref 13–17.7)
IMM GRANULOCYTES # BLD AUTO: 0.01 10*3/MM3 (ref 0–0.05)
IMM GRANULOCYTES NFR BLD AUTO: 0.2 % (ref 0–0.5)
LYMPHOCYTES # BLD AUTO: 2.37 10*3/MM3 (ref 0.7–3.1)
LYMPHOCYTES NFR BLD AUTO: 38.3 % (ref 19.6–45.3)
MCH RBC QN AUTO: 29.8 PG (ref 26.6–33)
MCHC RBC AUTO-ENTMCNC: 32.9 G/DL (ref 31.5–35.7)
MCV RBC AUTO: 90.8 FL (ref 79–97)
MONOCYTES # BLD AUTO: 0.61 10*3/MM3 (ref 0.1–0.9)
MONOCYTES NFR BLD AUTO: 9.9 % (ref 5–12)
NEUTROPHILS NFR BLD AUTO: 3.07 10*3/MM3 (ref 1.7–7)
NEUTROPHILS NFR BLD AUTO: 49.7 % (ref 42.7–76)
NRBC BLD AUTO-RTO: 0 /100 WBC (ref 0–0.2)
PLATELET # BLD AUTO: 220 10*3/MM3 (ref 140–450)
PMV BLD AUTO: 8.7 FL (ref 6–12)
POTASSIUM SERPL-SCNC: 4.9 MMOL/L (ref 3.5–4.7)
PROT SERPL-MCNC: 6.9 G/DL (ref 6.3–8)
RBC # BLD AUTO: 4.76 10*6/MM3 (ref 4.14–5.8)
SODIUM SERPL-SCNC: 139 MMOL/L (ref 134–145)
VIT B12 BLD-MCNC: 722 PG/ML (ref 211–946)
WBC NRBC COR # BLD: 6.18 10*3/MM3 (ref 3.4–10.8)

## 2022-10-06 PROCEDURE — 82607 VITAMIN B-12: CPT | Performed by: INTERNAL MEDICINE

## 2022-10-06 PROCEDURE — 36415 COLL VENOUS BLD VENIPUNCTURE: CPT

## 2022-10-06 PROCEDURE — 80053 COMPREHEN METABOLIC PANEL: CPT

## 2022-10-06 PROCEDURE — 85025 COMPLETE CBC W/AUTO DIFF WBC: CPT

## 2022-10-13 ENCOUNTER — APPOINTMENT (OUTPATIENT)
Dept: LAB | Facility: HOSPITAL | Age: 52
End: 2022-10-13

## 2022-10-13 ENCOUNTER — OFFICE VISIT (OUTPATIENT)
Dept: ONCOLOGY | Facility: CLINIC | Age: 52
End: 2022-10-13

## 2022-10-13 VITALS
TEMPERATURE: 97.1 F | HEART RATE: 77 BPM | OXYGEN SATURATION: 97 % | SYSTOLIC BLOOD PRESSURE: 130 MMHG | BODY MASS INDEX: 32.57 KG/M2 | HEIGHT: 70 IN | DIASTOLIC BLOOD PRESSURE: 89 MMHG | WEIGHT: 227.5 LBS

## 2022-10-13 DIAGNOSIS — Z85.060 PERSONAL HISTORY OF MALIGNANT CARCINOID TUMOR OF SMALL INTESTINE: Primary | Chronic | ICD-10-CM

## 2022-10-13 DIAGNOSIS — E53.8 VITAMIN B12 DEFICIENCY DUE TO INTESTINAL MALABSORPTION: Chronic | ICD-10-CM

## 2022-10-13 DIAGNOSIS — K90.9 VITAMIN B12 DEFICIENCY DUE TO INTESTINAL MALABSORPTION: Chronic | ICD-10-CM

## 2022-10-13 PROCEDURE — 36415 COLL VENOUS BLD VENIPUNCTURE: CPT | Performed by: INTERNAL MEDICINE

## 2022-10-13 PROCEDURE — 99213 OFFICE O/P EST LOW 20 MIN: CPT | Performed by: INTERNAL MEDICINE

## 2022-10-13 NOTE — PROGRESS NOTES
Subjective     REASON FOR FOLLOW UP:  RESECTED CARCINOID TUMOR of ILEUM    HISTORY OF PRESENT ILLNESS:  The patient is a 52 y.o. year old male who was having some abdominal complaints which led to a colonoscopy with Dr. Vo on 8/16/2016.  He was found to have a polyp in the terminal ileum which was biopsied and showed carcinoid tumor.  He subsequently underwent a staging CT scan of the abdomen and pelvis on 8/26/2016 which showed no evidence of distant metastases but did show a 1.7 cm regional lymph node.    The patient underwent a laparoscopic right hemicolectomy with resection of the terminal ileum and appendectomy performed by Dr Bedolla on 8/29/2016.  The pathology from that specimen as noted below showed an 8 mm carcinoid tumor in the terminal ileum with 1 out of 16 lymph nodes positive for metastatic carcinoid tumor (T3 N1 M0).    He was seen for initial consultation in our office in September 2016.  It was determined that he did not require any adjuvant treatment but we set up a surveillance schedule every 6 months or so to continue to monitor for recurrence.  When he reached the 5-year follow-up point we went to an annual follow-up schedule.    INTERVAL HISTORY:  He returns today for his routine annual follow-up visit with labs.  He seems to be getting along well.  His labs unfortunately did not include a serum chromogranin A level and we will plan to draw this in the office today and contact him when the results are available.    His last colonoscopy with Dr. Vo was on 6/17/2020.  The next scope would be planned in 5 years in June 2025.    History of Present Illness     Past Medical History:   Diagnosis Date   • Anxiety    • Blood in stool    • Cancer (HCC)     Terminal ileum (carcinoid tumor)   • Carcinoid tumor of ileum 8/29/2016   • Colon polyp    • Diarrhea    • Diverticulitis of colon    • Diverticulosis    • Dizziness    • GERD (gastroesophageal reflux disease)    • Headache    • Heart  palpitations     Patient takes Atenolol   • History of kidney stones    • Hypercholesterolemia    • Hypertension    • Low back pain    • Nephrolithiasis         Past Surgical History:   Procedure Laterality Date   • APPENDECTOMY     • COLON RESECTION Right 8/29/2016    Procedure: COLON RESECTION RIGHT LAPAROSCOPIC;  Surgeon: Baron Bedolla MD;  Location: [facility];  Service:    • COLONOSCOPY  05/21/2009    ih, tics    • COLONOSCOPY N/A 8/16/2016    NBIH, tics, one ileal polyp in terminal ileum, carcinoid tumor, TA w/low grade dysplasia   • COLONOSCOPY N/A 8/31/2017    - Patent side-to-side ileo-colonic anastomosis, characterized by inflammation, ulceration, visible sutures,congestion, edema, erosion and erythema- Diverticulosis in the sigmoid colon, in the descending colon, at the splenic flexure, in the transverse colon and atthe hepatic flexure.   • SMALL INTESTINE SURGERY  8/26/16    Bowel resection carinoid tumor   • TONSILLECTOMY      CHILDHOOD        ALLERGIES:    Allergies   Allergen Reactions   • Penicillins      Patient unsure had as a child         Review of Systems   Constitutional: Negative for activity change, appetite change, fatigue, fever and unexpected weight change.   HENT: Negative for hearing loss, nosebleeds, trouble swallowing and voice change.    Eyes: Negative for visual disturbance.   Respiratory: Negative for cough, shortness of breath and wheezing.    Cardiovascular: Negative for chest pain and palpitations.   Gastrointestinal: Negative for abdominal pain, diarrhea, nausea and vomiting.        Acid reflux   Genitourinary: Negative for difficulty urinating, frequency, hematuria and urgency.   Musculoskeletal: Negative for back pain and neck pain.   Skin: Negative for rash.   Neurological: Negative for dizziness, seizures, syncope and headaches.   Hematological: Negative for adenopathy. Does not bruise/bleed easily.   Psychiatric/Behavioral: Negative for behavioral problems. The  "patient is not nervous/anxious.        Objective     Vitals:    10/13/22 1617   BP: 130/89   Pulse: 77   Temp: 97.1 °F (36.2 °C)   TempSrc: Temporal   SpO2: 97%   Weight: 103 kg (227 lb 8 oz)   Height: 177.8 cm (70\")   PainSc: 0-No pain     Current Status 10/13/2022   ECOG score 0       Physical Exam   Constitutional: He is oriented to person, place, and time. He appears well-developed. No distress.   HENT:   Head: Normocephalic.   Eyes: Pupils are equal, round, and reactive to light. Conjunctivae are normal. No scleral icterus.   Neck: No JVD present. No thyromegaly present.   Cardiovascular: Normal rate and regular rhythm. Exam reveals no gallop and no friction rub.   No murmur heard.  Pulmonary/Chest: Effort normal and breath sounds normal. He has no wheezes. He has no rales.   Abdominal: Soft. He exhibits no distension and no mass. There is no abdominal tenderness.   Healed surgical scar RLQ.   Musculoskeletal: Normal range of motion. No deformity.   Lymphadenopathy:     He has no cervical adenopathy.   Neurological: He is alert and oriented to person, place, and time. He has normal reflexes. No cranial nerve deficit.   Skin: Skin is warm and dry. No rash noted. No erythema.   Psychiatric: His behavior is normal. Judgment normal.   I have reexamined the patient and the results are consistent with the previously documented exam. Tucker Buenrostro MD       RECENT LABS:  Hematology WBC   Date Value Ref Range Status   10/06/2022 6.18 3.40 - 10.80 10*3/mm3 Final   04/21/2022 5.7 3.4 - 10.8 x10E3/uL Final     RBC   Date Value Ref Range Status   10/06/2022 4.76 4.14 - 5.80 10*6/mm3 Final   04/21/2022 5.29 4.14 - 5.80 x10E6/uL Final     Hemoglobin   Date Value Ref Range Status   10/06/2022 14.2 13.0 - 17.7 g/dL Final     Hematocrit   Date Value Ref Range Status   10/06/2022 43.2 37.5 - 51.0 % Final     Platelets   Date Value Ref Range Status   10/06/2022 220 140 - 450 10*3/mm3 Final        Lab Results   Component Value " Date    PWQACTQK12 722 10/06/2022     CHROMOGRANIN A 129.6High    0.0 - 101.8 ng/mL      Lab Results   Component Value Date    GLUCOSE 94 10/06/2022    BUN 19 10/06/2022    CREATININE 0.98 10/06/2022    EGFRIFNONA 89 09/22/2021    EGFRIFAFRI 120 06/14/2021    BCR 19.4 10/06/2022    K 4.9 (H) 10/06/2022    CO2 27.1 10/06/2022    CALCIUM 9.5 10/06/2022    PROTENTOTREF 7.1 04/21/2022    ALBUMIN 4.40 10/06/2022    LABIL2 2.0 04/21/2022    AST 27 10/06/2022    ALT 42 (H) 10/06/2022     CT CHEST, ABDOMEN, AND PELVIS WITH IV CONTRAST 9/22/2021  IMPRESSION:  Tiny nonobstructing left renal calculus, unchanged since  09/23/2020. Otherwise unremarkable CT imaging of the chest, abdomen and  pelvis. There is no evidence of metastatic disease.      Assessment & Plan     1.  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  Patient has no evidence of distant metastatic disease clinically although unfortunately a chromogranin a level was not included with his labs from 10/6/2022.    2.  Resection of the terminal ileum putting him at risk for future B12 deficiency.  He has now on daily B12 replacement and his B12 level is remaining within normal limits.    Plan  1.  We apologized for not having the chromogranin A level available.  He will return to our lab today to have this drawn and we will contact him by phone when the results become available next week.    2.  Continue B12 tablets as maintenance 1000 µg by mouth daily.   3.  He otherwise will return for M.D. follow-up in 12 months and again will have labs performed 1 week prior to visit.  We decided to order scans on an as-needed basis from here on.  Certainly if he has any new symptoms or significant change in his labs we will order repeat scans at that time.  4.  He will follow-up with Dr. Vo for further surveillance colonoscopies.  Next scope will be due in June 2025

## 2022-10-14 LAB — CGA SERPL-MCNC: 703.6 NG/ML (ref 0–101.8)

## 2022-10-18 ENCOUNTER — TELEPHONE (OUTPATIENT)
Dept: ONCOLOGY | Facility: CLINIC | Age: 52
End: 2022-10-18

## 2022-10-18 DIAGNOSIS — Z85.060 PERSONAL HISTORY OF MALIGNANT CARCINOID TUMOR OF SMALL INTESTINE: Primary | ICD-10-CM

## 2022-10-18 NOTE — TELEPHONE ENCOUNTER
----- Message from Tucker Buenrostro MD sent at 10/17/2022  9:51 AM EDT -----  I spoke to this patient regarding his elevated CGA level.  This was drawn in the office last week and was markedly elevated but he had not been off his proton pump inhibitor therapy (Protonix) when it was drawn.    I instructed him to hold Protonix for now and he will need to have his CGA level repeated in the office in 2 weeks.  If he can please get that scheduled and let him know the date and time that would be great.    Thanks  ----- Message -----  From: Lab, Background User  Sent: 10/14/2022   4:12 PM EDT  To: Tucker Buenrostro MD

## 2022-10-26 ENCOUNTER — TELEPHONE (OUTPATIENT)
Dept: ONCOLOGY | Facility: CLINIC | Age: 52
End: 2022-10-26

## 2022-10-26 NOTE — TELEPHONE ENCOUNTER
Caller: Bill Cano    Relationship: Self    Best call back number: [phone]     What is the best time to reach you: ANY    What was the call regarding: PT TESTED POSITIVE FOR COVID LAST Thursday 10/20. HE IS SCHEDULED FOR AN APPT ON 10/31 AND ASKS IF IT IS OKAY FOR HIM TO STILL COME IN?    Do you require a callback: YES

## 2022-10-26 NOTE — TELEPHONE ENCOUNTER
Returned call to patient with the information that he can come in for appointment on 10/31/2022 as scheduled.  He v/u.

## 2022-10-31 ENCOUNTER — OFFICE VISIT (OUTPATIENT)
Dept: INTERNAL MEDICINE | Facility: CLINIC | Age: 52
End: 2022-10-31

## 2022-10-31 ENCOUNTER — LAB (OUTPATIENT)
Dept: LAB | Facility: HOSPITAL | Age: 52
End: 2022-10-31

## 2022-10-31 VITALS
SYSTOLIC BLOOD PRESSURE: 130 MMHG | TEMPERATURE: 97.3 F | WEIGHT: 226.3 LBS | HEART RATE: 89 BPM | DIASTOLIC BLOOD PRESSURE: 84 MMHG | BODY MASS INDEX: 32.4 KG/M2 | HEIGHT: 70 IN | OXYGEN SATURATION: 100 %

## 2022-10-31 DIAGNOSIS — E53.8 VITAMIN B12 DEFICIENCY DUE TO INTESTINAL MALABSORPTION: Chronic | ICD-10-CM

## 2022-10-31 DIAGNOSIS — Z85.060 PERSONAL HISTORY OF MALIGNANT CARCINOID TUMOR OF SMALL INTESTINE: ICD-10-CM

## 2022-10-31 DIAGNOSIS — K90.9 VITAMIN B12 DEFICIENCY DUE TO INTESTINAL MALABSORPTION: Chronic | ICD-10-CM

## 2022-10-31 DIAGNOSIS — E53.8 VITAMIN B12 DEFICIENCY DUE TO INTESTINAL MALABSORPTION: ICD-10-CM

## 2022-10-31 DIAGNOSIS — K21.9 GASTROESOPHAGEAL REFLUX DISEASE WITHOUT ESOPHAGITIS: Chronic | ICD-10-CM

## 2022-10-31 DIAGNOSIS — K90.9 VITAMIN B12 DEFICIENCY DUE TO INTESTINAL MALABSORPTION: ICD-10-CM

## 2022-10-31 DIAGNOSIS — E78.00 PURE HYPERCHOLESTEROLEMIA: Primary | Chronic | ICD-10-CM

## 2022-10-31 DIAGNOSIS — F41.1 GENERALIZED ANXIETY DISORDER: Chronic | ICD-10-CM

## 2022-10-31 LAB
ALBUMIN SERPL-MCNC: 4.7 G/DL (ref 3.5–5.2)
ALBUMIN/GLOB SERPL: 1.6 G/DL (ref 1.1–2.4)
ALP SERPL-CCNC: 77 U/L (ref 38–116)
ALT SERPL W P-5'-P-CCNC: 55 U/L (ref 0–41)
ANION GAP SERPL CALCULATED.3IONS-SCNC: 13.6 MMOL/L (ref 5–15)
AST SERPL-CCNC: 30 U/L (ref 0–40)
BASOPHILS # BLD AUTO: 0.05 10*3/MM3 (ref 0–0.2)
BASOPHILS NFR BLD AUTO: 0.7 % (ref 0–1.5)
BILIRUB SERPL-MCNC: 0.6 MG/DL (ref 0.2–1.2)
BUN SERPL-MCNC: 18 MG/DL (ref 6–20)
BUN/CREAT SERPL: 20 (ref 7.3–30)
CALCIUM SPEC-SCNC: 9.9 MG/DL (ref 8.5–10.2)
CHLORIDE SERPL-SCNC: 104 MMOL/L (ref 98–107)
CO2 SERPL-SCNC: 25.4 MMOL/L (ref 22–29)
CREAT SERPL-MCNC: 0.9 MG/DL (ref 0.7–1.3)
DEPRECATED RDW RBC AUTO: 42.9 FL (ref 37–54)
EGFRCR SERPLBLD CKD-EPI 2021: 102.1 ML/MIN/1.73
EOSINOPHIL # BLD AUTO: 0.1 10*3/MM3 (ref 0–0.4)
EOSINOPHIL NFR BLD AUTO: 1.4 % (ref 0.3–6.2)
ERYTHROCYTE [DISTWIDTH] IN BLOOD BY AUTOMATED COUNT: 12.9 % (ref 12.3–15.4)
GLOBULIN UR ELPH-MCNC: 3 GM/DL (ref 1.8–3.5)
GLUCOSE SERPL-MCNC: 102 MG/DL (ref 74–124)
HCT VFR BLD AUTO: 46.6 % (ref 37.5–51)
HGB BLD-MCNC: 15 G/DL (ref 13–17.7)
IMM GRANULOCYTES # BLD AUTO: 0.07 10*3/MM3 (ref 0–0.05)
IMM GRANULOCYTES NFR BLD AUTO: 0.9 % (ref 0–0.5)
LYMPHOCYTES # BLD AUTO: 2.22 10*3/MM3 (ref 0.7–3.1)
LYMPHOCYTES NFR BLD AUTO: 30.1 % (ref 19.6–45.3)
MCH RBC QN AUTO: 29.4 PG (ref 26.6–33)
MCHC RBC AUTO-ENTMCNC: 32.2 G/DL (ref 31.5–35.7)
MCV RBC AUTO: 91.2 FL (ref 79–97)
MONOCYTES # BLD AUTO: 0.52 10*3/MM3 (ref 0.1–0.9)
MONOCYTES NFR BLD AUTO: 7.1 % (ref 5–12)
NEUTROPHILS NFR BLD AUTO: 4.41 10*3/MM3 (ref 1.7–7)
NEUTROPHILS NFR BLD AUTO: 59.8 % (ref 42.7–76)
NRBC BLD AUTO-RTO: 0 /100 WBC (ref 0–0.2)
PLATELET # BLD AUTO: 254 10*3/MM3 (ref 140–450)
PMV BLD AUTO: 8.5 FL (ref 6–12)
POTASSIUM SERPL-SCNC: 5.1 MMOL/L (ref 3.5–4.7)
PROT SERPL-MCNC: 7.7 G/DL (ref 6.3–8)
RBC # BLD AUTO: 5.11 10*6/MM3 (ref 4.14–5.8)
SODIUM SERPL-SCNC: 143 MMOL/L (ref 134–145)
VIT B12 BLD-MCNC: 804 PG/ML (ref 211–946)
WBC NRBC COR # BLD: 7.37 10*3/MM3 (ref 3.4–10.8)

## 2022-10-31 PROCEDURE — 36415 COLL VENOUS BLD VENIPUNCTURE: CPT

## 2022-10-31 PROCEDURE — 80053 COMPREHEN METABOLIC PANEL: CPT

## 2022-10-31 PROCEDURE — 99214 OFFICE O/P EST MOD 30 MIN: CPT | Performed by: NURSE PRACTITIONER

## 2022-10-31 PROCEDURE — 82607 VITAMIN B-12: CPT | Performed by: INTERNAL MEDICINE

## 2022-10-31 PROCEDURE — 85025 COMPLETE CBC W/AUTO DIFF WBC: CPT

## 2022-10-31 NOTE — ASSESSMENT & PLAN NOTE
He has been managed with pantoprazole which controls symptoms well, he takes Tums as needed for breakthrough symptoms.

## 2022-10-31 NOTE — ASSESSMENT & PLAN NOTE
He denies myalgias with Vytorin which he will continue along with a low-fat, low-cholesterol diet.   with 4/22 labs.

## 2022-10-31 NOTE — PROGRESS NOTES
"Chief Complaint  Hyperlipidemia (6 month follow up)    Subjective        Bill Cano presents to [facility]  History of Present Illness  He reports feeling well, tested positive for COVID-19 a couple weeks ago and states resolution of his symptoms.  He experienced mild respiratory symptoms without dyspnea.  He is planning to return to work.  His recent labs with oncology did not show any acute abnormalities, has noted increased frequency of dyspepsia since holding pantoprazole for the past couple weeks for an upcoming blood test.      Objective   Vital Signs:  /84 (BP Location: Left arm, Patient Position: Sitting, Cuff Size: Adult)   Pulse 89   Temp 97.3 °F (36.3 °C) (Temporal)   Ht 177.8 cm (70\")   Wt 103 kg (226 lb 4.8 oz)   SpO2 100%   BMI 32.47 kg/m²   Estimated body mass index is 32.47 kg/m² as calculated from the following:    Height as of this encounter: 177.8 cm (70\").    Weight as of this encounter: 103 kg (226 lb 4.8 oz).          Physical Exam  Constitutional:       Appearance: He is well-developed. He is not ill-appearing.   HENT:      Head: Normocephalic.      Right Ear: Hearing, tympanic membrane and external ear normal.      Left Ear: Hearing, tympanic membrane and external ear normal.      Nose: Nose normal. No nasal deformity, mucosal edema or rhinorrhea.      Right Sinus: No maxillary sinus tenderness or frontal sinus tenderness.      Left Sinus: No maxillary sinus tenderness or frontal sinus tenderness.      Mouth/Throat:      Dentition: Normal dentition.   Eyes:      General: Lids are normal.         Right eye: No discharge.         Left eye: No discharge.      Conjunctiva/sclera: Conjunctivae normal.      Right eye: No exudate.     Left eye: No exudate.  Neck:      Thyroid: No thyroid mass or thyromegaly.      Vascular: No carotid bruit.      Trachea: Trachea normal.   Cardiovascular:      Rate and Rhythm: Regular rhythm.      Pulses: Normal pulses. "      Heart sounds: Normal heart sounds. No murmur heard.  Pulmonary:      Effort: No respiratory distress.      Breath sounds: Normal breath sounds. No decreased breath sounds, wheezing, rhonchi or rales.   Abdominal:      General: Bowel sounds are normal.      Palpations: Abdomen is soft.      Tenderness: There is no abdominal tenderness.   Musculoskeletal:      Cervical back: Normal range of motion. No edema.   Lymphadenopathy:      Head:      Right side of head: No submental, submandibular, tonsillar, preauricular, posterior auricular or occipital adenopathy.      Left side of head: No submental, submandibular, tonsillar, preauricular, posterior auricular or occipital adenopathy.   Skin:     General: Skin is warm and dry.      Nails: There is no clubbing.   Neurological:      Mental Status: He is alert.   Psychiatric:         Behavior: Behavior is cooperative.        Result Review :  The following data was reviewed by: FELIPE Nj on 10/31/2022:  Common labs    Common Labs 4/21/22 4/21/22 4/21/22 4/21/22 10/6/22 10/6/22 10/31/22    0958 0958 0958 0958 1516 1516    Glucose  96    94    BUN  18    19    Creatinine  1.00    0.98    Sodium  136    139    Potassium  4.6    4.9 (A)    Chloride  96    102    Calcium  10.1    9.5    Total Protein  7.1        Albumin  4.7    4.40    Total Bilirubin  0.9    0.7    Alkaline Phosphatase  60    61    AST (SGOT)  21    27    ALT (SGPT)  29    42 (A)    WBC 5.7    6.18  7.37   Hemoglobin 15.8    14.2  15.0   Hematocrit 46.5    43.2  46.6   Platelets 232    220  254   Total Cholesterol   191       Triglycerides   127       HDL Cholesterol   61       LDL Cholesterol    108 (A)       PSA    0.5      (A) Abnormal value       Comments are available for some flowsheets but are not being displayed.                     Assessment and Plan   Diagnoses and all orders for this visit:    1. Pure hypercholesterolemia (Primary)  Assessment & Plan:  He denies myalgias with Vytorin  which he will continue along with a low-fat, low-cholesterol diet.   with 4/22 labs.      2. Gastroesophageal reflux disease without esophagitis  Assessment & Plan:  He has been managed with pantoprazole which controls symptoms well, he takes Tums as needed for breakthrough symptoms.      3. Vitamin B12 deficiency due to intestinal malabsorption  Assessment & Plan:  He is managed on a daily vitamin B12 supplement, level 722 with labs 3 weeks ago.      4. Generalized anxiety disorder  Assessment & Plan:  Symptoms managed with daily Cymbalta which he will continue.           Follow Up   Return in about 6 months (around 4/30/2023) for Annual physical.  Patient was given instructions and counseling regarding his condition or for health maintenance advice. Please see specific information pulled into the AVS if appropriate.       Answers for HPI/ROS submitted by the patient on 10/29/2022  What is the primary reason for your visit?: Physical

## 2022-11-01 LAB — CGA SERPL-MCNC: 43.6 NG/ML (ref 0–101.8)

## 2022-11-02 ENCOUNTER — TELEPHONE (OUTPATIENT)
Dept: ONCOLOGY | Facility: CLINIC | Age: 52
End: 2022-11-02

## 2022-11-02 NOTE — TELEPHONE ENCOUNTER
Patient made aware regarding normal result, reminded of his follow up lab and MD appointment next year, Sep 2023, reminded to hold his Protonix for 2weeks prior to lab drawn, patient v/u and instructed to call the office if has any concerns.

## 2022-11-02 NOTE — TELEPHONE ENCOUNTER
----- Message from Tucker Buenrostro MD sent at 11/2/2022  7:52 AM EDT -----  Kiki,    Please contact this patient and let him know that his repeat chromogranin level was normal.    Thanks  ----- Message -----  From: Lab, Background User  Sent: 10/31/2022   9:32 AM EDT  To: Tucker Buenrostro MD

## 2022-12-03 DIAGNOSIS — R10.13 EPIGASTRIC PAIN: ICD-10-CM

## 2022-12-03 DIAGNOSIS — R00.2 PALPITATIONS: ICD-10-CM

## 2022-12-05 ENCOUNTER — TELEPHONE (OUTPATIENT)
Dept: INTERNAL MEDICINE | Facility: CLINIC | Age: 52
End: 2022-12-05

## 2022-12-05 ENCOUNTER — OFFICE VISIT (OUTPATIENT)
Dept: INTERNAL MEDICINE | Facility: CLINIC | Age: 52
End: 2022-12-05

## 2022-12-05 VITALS
WEIGHT: 229.6 LBS | TEMPERATURE: 96.6 F | HEART RATE: 91 BPM | OXYGEN SATURATION: 98 % | HEIGHT: 70 IN | DIASTOLIC BLOOD PRESSURE: 90 MMHG | SYSTOLIC BLOOD PRESSURE: 134 MMHG | BODY MASS INDEX: 32.87 KG/M2

## 2022-12-05 DIAGNOSIS — R51.9 NONINTRACTABLE EPISODIC HEADACHE, UNSPECIFIED HEADACHE TYPE: ICD-10-CM

## 2022-12-05 DIAGNOSIS — G51.8 FACIAL NEURALGIA: Primary | ICD-10-CM

## 2022-12-05 PROCEDURE — 99214 OFFICE O/P EST MOD 30 MIN: CPT | Performed by: NURSE PRACTITIONER

## 2022-12-05 RX ORDER — PANTOPRAZOLE SODIUM 40 MG/1
TABLET, DELAYED RELEASE ORAL
Qty: 90 TABLET | Refills: 1 | Status: SHIPPED | OUTPATIENT
Start: 2022-12-05

## 2022-12-05 RX ORDER — ATENOLOL 25 MG/1
TABLET ORAL
Qty: 45 TABLET | Refills: 1 | Status: SHIPPED | OUTPATIENT
Start: 2022-12-05

## 2022-12-05 NOTE — TELEPHONE ENCOUNTER
Hub staff attempted to follow warm transfer process and was unsuccessful     Caller: Bill Cano    Relationship to patient: Self    Best call back number: [phone]     Patient is needing: PATIENT IS EXPERIENCING PAIN IN EYEBROWS WHEN CHEWING, SWALLOWING, OR MOVING TOUNGE, AND HEADACHE IN RIGHT EYE.    PATIENT STATED HE WOULD BE ABLE TO COME IN THIS AFTERNOON, AND WOULD LIKE TO SEE ONLY DOTTIE WIN.    PLEASE CALL [facility] TO SCHEDULE PATIENT.

## 2022-12-05 NOTE — PROGRESS NOTES
"Chief Complaint  Facial Pain    Subjective        Bill Cano presents to [facility]  History of Present Illness  The patient presents in the clinic today due to right-sided facial pain.       He notes intermittent episodes of sharp, stabbing pain which began at the corner of his right eye and radiate to the right side of his face and head.  He describes it more as a lightening bolt.  He states symptoms started several weeks ago and have increased in frequency.  Denies rashes.  He has had intermittent headaches.  He does notice increased pain with eating. Saturday he was eating and had pain every time he chewed. He reports that he was moving his tongue yesterday he noticed increased sensations.He denies any numbness or tingling in his face.      The patient has been having trouble swallowing pills. He notes he has to take his pills one at a time. He reports that he has choked on his cholesterol pill before.     The patient's potassium was a little high (5.1) which has been intermittent with recent labs. He reports that he eats a lot of avocados.    The patient expresses that he has been having neck pain. He reports his pain is worse when stretching his neck. No radicular sx.    Objective   Vital Signs:  /90 (BP Location: Left arm, Patient Position: Sitting, Cuff Size: Adult)   Pulse 91   Temp 96.6 °F (35.9 °C) (Temporal)   Ht 177.8 cm (70\")   Wt 104 kg (229 lb 9.6 oz)   SpO2 98%   BMI 32.94 kg/m²   Estimated body mass index is 32.94 kg/m² as calculated from the following:    Height as of this encounter: 177.8 cm (70\").    Weight as of this encounter: 104 kg (229 lb 9.6 oz).    BMI is >= 30 and <35. (Class 1 Obesity). The following options were offered after discussion;: nutrition counseling/recommendations      Physical Exam  Constitutional:       General: He is not in acute distress.     Appearance: Normal appearance. He is not diaphoretic.   HENT:      Head: " Normocephalic and atraumatic.      Right Ear: Tympanic membrane, ear canal and external ear normal.      Left Ear: Tympanic membrane, ear canal and external ear normal.      Nose: Nose normal. No rhinorrhea.      Mouth/Throat:      Mouth: Mucous membranes are moist.      Pharynx: Oropharynx is clear.   Eyes:      General:         Right eye: No discharge.         Left eye: No discharge.      Conjunctiva/sclera: Conjunctivae normal.   Cardiovascular:      Rate and Rhythm: Normal rate and regular rhythm.      Pulses: Normal pulses.      Heart sounds: Normal heart sounds.   Pulmonary:      Effort: Pulmonary effort is normal.      Breath sounds: Normal breath sounds.   Abdominal:      General: Bowel sounds are normal.      Tenderness: There is no abdominal tenderness.   Musculoskeletal:         General: No swelling or tenderness.      Cervical back: Normal range of motion.   Skin:     General: Skin is warm and dry.   Neurological:      General: No focal deficit present.      Mental Status: He is alert and oriented to person, place, and time.      Sensory: Sensation is intact.      Motor: Motor function is intact. No weakness.      Comments: Cranial nerves II-X gross intact   Psychiatric:         Mood and Affect: Mood normal.         Behavior: Behavior normal.         Judgment: Judgment normal.        Result Review :  The following data was reviewed by: FELIPE Nj on 12/05/2022:  Common labs    Common Labs 4/21/22 4/21/22 4/21/22 4/21/22 10/6/22 10/6/22 10/31/22 10/31/22    0958 0958 0958 0958 1516 1516 0917 0917   Glucose  96    94  102   BUN  18    19  18   Creatinine  1.00    0.98  0.90   Sodium  136    139  143   Potassium  4.6    4.9 (A)  5.1 (A)   Chloride  96    102  104   Calcium  10.1    9.5  9.9   Total Protein  7.1         Albumin  4.7    4.40  4.70   Total Bilirubin  0.9    0.7  0.6   Alkaline Phosphatase  60    61  77   AST (SGOT)  21    27  30   ALT (SGPT)  29    42 (A)  55 (A)   WBC 5.7    6.18   7.37    Hemoglobin 15.8    14.2  15.0    Hematocrit 46.5    43.2  46.6    Platelets 232    220  254    Total Cholesterol   191        Triglycerides   127        HDL Cholesterol   61        LDL Cholesterol    108 (A)        PSA    0.5       (A) Abnormal value       Comments are available for some flowsheets but are not being displayed.           Data reviewed: GI studies EGD 6/2020          Assessment and Plan   Diagnoses and all orders for this visit:    1. Facial neuralgia (Primary)  -     MRI Brain With & Without Contrast; Future  -     TSH  -     Comprehensive Metabolic Panel  -     Magnesium    2. Nonintractable episodic headache, unspecified headache type  -     MRI Brain With & Without Contrast; Future    He has intermittent episodes of sharp, burning pain which begins at the corner of his right eye and radiate to the right side of his face and head.  He has also had increased headaches.  Symptoms due to neuralgia?  We will obtain MRI to further evaluate as well as check labs.       Follow Up   Return if symptoms worsen or fail to improve, for Next scheduled follow up.  Patient was given instructions and counseling regarding his condition or for health maintenance advice. Please see specific information pulled into the AVS if appropriate.       Answers for HPI/ROS submitted by the patient on 12/5/2022  What is the primary reason for your visit?: Other  Please describe your symptoms.: Pain above right eye.  Comes and goes when chewing, swallowing or moving tongue.    Transcribed from ambient dictation for FELIPE Nj by Anthony Whittaker.  12/05/22   20:00 EST    Patient or patient representative verbalized consent to the visit recording.  I have personally performed the services described in this document as transcribed by the above individual, and it is both accurate and complete.      Have you had these symptoms before?: No  How long have you been having these symptoms?: 1-2 weeks  Please list any  medications you are currently taking for this condition.: Ibuprophen  Please describe any probable cause for these symptoms. : ?

## 2022-12-06 LAB
ALBUMIN SERPL-MCNC: 4.6 G/DL (ref 3.8–4.9)
ALBUMIN/GLOB SERPL: 2 {RATIO} (ref 1.2–2.2)
ALP SERPL-CCNC: 63 IU/L (ref 44–121)
ALT SERPL-CCNC: 32 IU/L (ref 0–44)
AST SERPL-CCNC: 22 IU/L (ref 0–40)
BILIRUB SERPL-MCNC: 0.6 MG/DL (ref 0–1.2)
BUN SERPL-MCNC: 17 MG/DL (ref 6–24)
BUN/CREAT SERPL: 17 (ref 9–20)
CALCIUM SERPL-MCNC: 9.6 MG/DL (ref 8.7–10.2)
CHLORIDE SERPL-SCNC: 101 MMOL/L (ref 96–106)
CO2 SERPL-SCNC: 25 MMOL/L (ref 20–29)
CREAT SERPL-MCNC: 1.02 MG/DL (ref 0.76–1.27)
EGFRCR SERPLBLD CKD-EPI 2021: 88 ML/MIN/1.73
GLOBULIN SER CALC-MCNC: 2.3 G/DL (ref 1.5–4.5)
GLUCOSE SERPL-MCNC: 96 MG/DL (ref 70–99)
MAGNESIUM SERPL-MCNC: 2.2 MG/DL (ref 1.6–2.3)
POTASSIUM SERPL-SCNC: 5.5 MMOL/L (ref 3.5–5.2)
PROT SERPL-MCNC: 6.9 G/DL (ref 6–8.5)
SODIUM SERPL-SCNC: 139 MMOL/L (ref 134–144)
TSH SERPL DL<=0.005 MIU/L-ACNC: 2.78 UIU/ML (ref 0.45–4.5)

## 2023-01-09 ENCOUNTER — HOSPITAL ENCOUNTER (OUTPATIENT)
Dept: MRI IMAGING | Facility: HOSPITAL | Age: 53
Discharge: HOME | End: 2023-01-09
Admitting: NURSE PRACTITIONER
Payer: COMMERCIAL

## 2023-01-09 DIAGNOSIS — R51.9 NONINTRACTABLE EPISODIC HEADACHE, UNSPECIFIED HEADACHE TYPE: ICD-10-CM

## 2023-01-09 DIAGNOSIS — G51.8 FACIAL NEURALGIA: ICD-10-CM

## 2023-01-09 PROCEDURE — 0 GADOBENATE DIMEGLUMINE 529 MG/ML SOLUTION: Performed by: NURSE PRACTITIONER

## 2023-01-09 PROCEDURE — 82565 ASSAY OF CREATININE: CPT

## 2023-01-09 PROCEDURE — 70553 MRI BRAIN STEM W/O & W/DYE: CPT

## 2023-01-09 PROCEDURE — A9577 INJ MULTIHANCE: HCPCS | Performed by: NURSE PRACTITIONER

## 2023-01-09 RX ADMIN — GADOBENATE DIMEGLUMINE 20 ML: 529 INJECTION, SOLUTION INTRAVENOUS at 07:23

## 2023-01-10 LAB — CREAT BLDA-MCNC: 1 MG/DL (ref 0.6–1.3)

## 2023-03-05 DIAGNOSIS — E78.5 HYPERLIPIDEMIA, UNSPECIFIED HYPERLIPIDEMIA TYPE: ICD-10-CM

## 2023-03-05 DIAGNOSIS — F41.9 ANXIETY: ICD-10-CM

## 2023-03-06 RX ORDER — EZETIMIBE AND SIMVASTATIN 10; 40 MG/1; MG/1
TABLET ORAL
Qty: 90 TABLET | Refills: 1 | Status: SHIPPED | OUTPATIENT
Start: 2023-03-06

## 2023-03-06 RX ORDER — DULOXETIN HYDROCHLORIDE 30 MG/1
CAPSULE, DELAYED RELEASE ORAL
Qty: 90 CAPSULE | Refills: 2 | Status: SHIPPED | OUTPATIENT
Start: 2023-03-06

## 2023-03-06 NOTE — TELEPHONE ENCOUNTER
Rx Refill Note  Requested Prescriptions     Pending Prescriptions Disp Refills   • DULoxetine (CYMBALTA) 30 MG capsule [Pharmacy Med Name: DULOXETINE CAP 30MG DR] 90 capsule 2     Sig: TAKE 1 CAPSULE DAILY   • ezetimibe-simvastatin (VYTORIN) 10-40 MG per tablet [Pharmacy Med Name: EZETIM/SIMVA TAB 10-40MG] 90 tablet 1     Sig: TAKE 1 TABLET DAILY      Last office visit with prescribing clinician: 12/5/2022   Last telemedicine visit with prescribing clinician: 5/22/2023   Next office visit with prescribing clinician: 5/22/2023                         Would you like a call back once the refill request has been completed: [] Yes [] No    If the office needs to give you a call back, can they leave a voicemail: [] Yes [] No    Calista Vaughan  03/06/23, 07:53 EST

## 2023-05-18 DIAGNOSIS — R00.2 PALPITATIONS: ICD-10-CM

## 2023-05-18 DIAGNOSIS — R10.13 EPIGASTRIC PAIN: ICD-10-CM

## 2023-05-18 RX ORDER — ATENOLOL 25 MG/1
TABLET ORAL
Qty: 45 TABLET | Refills: 1 | Status: SHIPPED | OUTPATIENT
Start: 2023-05-18

## 2023-05-18 RX ORDER — PANTOPRAZOLE SODIUM 40 MG/1
TABLET, DELAYED RELEASE ORAL
Qty: 90 TABLET | Refills: 1 | Status: SHIPPED | OUTPATIENT
Start: 2023-05-18

## 2023-05-22 ENCOUNTER — OFFICE VISIT (OUTPATIENT)
Dept: INTERNAL MEDICINE | Facility: CLINIC | Age: 53
End: 2023-05-22
Payer: COMMERCIAL

## 2023-05-22 VITALS
BODY MASS INDEX: 32.07 KG/M2 | HEART RATE: 96 BPM | HEIGHT: 70 IN | DIASTOLIC BLOOD PRESSURE: 86 MMHG | OXYGEN SATURATION: 98 % | SYSTOLIC BLOOD PRESSURE: 120 MMHG | WEIGHT: 224 LBS | TEMPERATURE: 97.7 F

## 2023-05-22 DIAGNOSIS — E78.00 PURE HYPERCHOLESTEROLEMIA: Chronic | ICD-10-CM

## 2023-05-22 DIAGNOSIS — E53.8 VITAMIN B12 DEFICIENCY DUE TO INTESTINAL MALABSORPTION: Chronic | ICD-10-CM

## 2023-05-22 DIAGNOSIS — Z12.5 SCREENING FOR PROSTATE CANCER: ICD-10-CM

## 2023-05-22 DIAGNOSIS — K21.9 GASTROESOPHAGEAL REFLUX DISEASE WITHOUT ESOPHAGITIS: Chronic | ICD-10-CM

## 2023-05-22 DIAGNOSIS — F41.1 GENERALIZED ANXIETY DISORDER: Chronic | ICD-10-CM

## 2023-05-22 DIAGNOSIS — Z00.00 PE (PHYSICAL EXAM), ANNUAL: Primary | ICD-10-CM

## 2023-05-22 DIAGNOSIS — K90.9 VITAMIN B12 DEFICIENCY DUE TO INTESTINAL MALABSORPTION: Chronic | ICD-10-CM

## 2023-05-22 LAB
ALBUMIN SERPL-MCNC: 4.8 G/DL (ref 3.5–5.2)
ALBUMIN/GLOB SERPL: 2.2 G/DL
ALP SERPL-CCNC: 68 U/L (ref 39–117)
ALT SERPL-CCNC: 37 U/L (ref 1–41)
APPEARANCE UR: CLEAR
AST SERPL-CCNC: 28 U/L (ref 1–40)
BILIRUB SERPL-MCNC: 0.6 MG/DL (ref 0–1.2)
BILIRUB UR QL STRIP: NEGATIVE
BUN SERPL-MCNC: 22 MG/DL (ref 6–20)
BUN/CREAT SERPL: 17.1 (ref 7–25)
CALCIUM SERPL-MCNC: 10.2 MG/DL (ref 8.6–10.5)
CHLORIDE SERPL-SCNC: 102 MMOL/L (ref 98–107)
CHOLEST SERPL-MCNC: 172 MG/DL (ref 0–200)
CO2 SERPL-SCNC: 30.7 MMOL/L (ref 22–29)
COLOR UR: YELLOW
CREAT SERPL-MCNC: 1.29 MG/DL (ref 0.76–1.27)
EGFRCR SERPLBLD CKD-EPI 2021: 65.9 ML/MIN/1.73
ERYTHROCYTE [DISTWIDTH] IN BLOOD BY AUTOMATED COUNT: 12.8 % (ref 12.3–15.4)
GLOBULIN SER CALC-MCNC: 2.2 GM/DL
GLUCOSE SERPL-MCNC: 120 MG/DL (ref 65–99)
GLUCOSE UR QL STRIP: NEGATIVE
HCT VFR BLD AUTO: 45.7 % (ref 37.5–51)
HDLC SERPL-MCNC: 55 MG/DL (ref 40–60)
HGB BLD-MCNC: 15.4 G/DL (ref 13–17.7)
HGB UR QL STRIP: NEGATIVE
KETONES UR QL STRIP: ABNORMAL
LDLC SERPL CALC-MCNC: 85 MG/DL (ref 0–100)
LEUKOCYTE ESTERASE UR QL STRIP: NEGATIVE
MCH RBC QN AUTO: 29.8 PG (ref 26.6–33)
MCHC RBC AUTO-ENTMCNC: 33.7 G/DL (ref 31.5–35.7)
MCV RBC AUTO: 88.6 FL (ref 79–97)
NITRITE UR QL STRIP: NEGATIVE
PH UR STRIP: 6.5 [PH] (ref 5–8)
PLATELET # BLD AUTO: 233 10*3/MM3 (ref 140–450)
POTASSIUM SERPL-SCNC: 5.4 MMOL/L (ref 3.5–5.2)
PROT SERPL-MCNC: 7 G/DL (ref 6–8.5)
PROT UR QL STRIP: ABNORMAL
PSA SERPL-MCNC: 1.06 NG/ML (ref 0–4)
RBC # BLD AUTO: 5.16 10*6/MM3 (ref 4.14–5.8)
SODIUM SERPL-SCNC: 140 MMOL/L (ref 136–145)
SP GR UR STRIP: ABNORMAL (ref 1–1.03)
TRIGL SERPL-MCNC: 190 MG/DL (ref 0–150)
TSH SERPL DL<=0.005 MIU/L-ACNC: 2.78 UIU/ML (ref 0.27–4.2)
UROBILINOGEN UR STRIP-MCNC: ABNORMAL MG/DL
VLDLC SERPL CALC-MCNC: 32 MG/DL (ref 5–40)
WBC # BLD AUTO: 5.76 10*3/MM3 (ref 3.4–10.8)

## 2023-05-22 PROCEDURE — 99396 PREV VISIT EST AGE 40-64: CPT | Performed by: NURSE PRACTITIONER

## 2023-05-22 NOTE — ASSESSMENT & PLAN NOTE
Symptoms managed with daily pantoprazole, EGD performed 6/17/2020 by Dr. Vo.  He takes Tums only as needed for breakthrough symptoms.

## 2023-05-22 NOTE — ASSESSMENT & PLAN NOTE
He denies myalgias with Vytorin which he will continue along with a low-fat, low-cholesterol diet.   Lab Results   Component Value Date     (H) 04/21/2022

## 2023-05-22 NOTE — PROGRESS NOTES
Subjective   Bill Cano is a 53 y.o. male who is here for a physical exam.    History of Present Illness  He is doing well, notes good control of reflux symptoms with pantoprazole daily, takes Tums only as needed.  His blood pressure has remained stable with atenolol, denies chest pain or shortness of breath.  He takes an over-the-counter antihistamine as needed which he discontinued last week due to improvement of symptoms.  He was seen in January for pain in his right eyebrow which has since  resolved, no acute abnormalities noted on MRI of the brain.  He did see his dentist and was given a mouthguard which has been helpful.       The following portions of the patient's history were reviewed and updated as appropriate: allergies, current medications, past social history and problem list.    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   • Colon polyp    • Diarrhea    • Diverticulitis of colon    • Diverticulosis    • Dizziness    • GERD (gastroesophageal reflux disease)    • Headache    • Heart palpitations     Patient takes Atenolol   • History of kidney stones    • Hypercholesterolemia    • Hypertension    • Low back pain    • Nephrolithiasis          Current Outpatient Medications:   •  atenolol (TENORMIN) 25 MG tablet, TAKE 1/2 TABLET EVERY      EVENING, Disp: 45 tablet, Rfl: 1  •  calcium carbonate EX (TUMS EX) 750 MG chewable tablet, Chew 1 tablet As Needed for Indigestion or Heartburn., Disp: , Rfl:   •  DULoxetine (CYMBALTA) 30 MG capsule, TAKE 1 CAPSULE DAILY, Disp: 90 capsule, Rfl: 2  •  ezetimibe-simvastatin (VYTORIN) 10-40 MG per tablet, TAKE 1 TABLET DAILY, Disp: 90 tablet, Rfl: 1  •  pantoprazole (PROTONIX) 40 MG EC tablet, TAKE 1 TABLET DAILY, Disp: 90 tablet, Rfl: 1  •  vitamin B-12 (CYANOCOBALAMIN) 1000 MCG tablet, Take 1 tablet by mouth Daily., Disp: 90 tablet, Rfl: 3    Allergies    Allergen Reactions   • Penicillins      Patient unsure had as a child       Review of Systems   Constitutional: Negative for activity change, appetite change, chills, diaphoresis, fatigue, fever and unexpected weight change.   HENT: Positive for congestion and postnasal drip. Negative for dental problem, drooling, ear discharge, ear pain, facial swelling, hearing loss, mouth sores, nosebleeds, rhinorrhea, sinus pressure, sore throat, tinnitus and trouble swallowing.    Eyes: Negative for photophobia, pain, discharge, redness, itching and visual disturbance.   Respiratory: Negative for apnea, cough, choking, chest tightness, shortness of breath and wheezing.    Cardiovascular: Negative for chest pain, palpitations and leg swelling.        No orthopnea, PND, MILLER   Gastrointestinal: Negative for abdominal pain, blood in stool, constipation, diarrhea, nausea and vomiting.        Dyspepsia, controlled with meds   Endocrine: Negative for cold intolerance, heat intolerance, polydipsia and polyuria.   Genitourinary: Negative for decreased urine volume, dysuria, enuresis, flank pain, frequency, hematuria and urgency.   Musculoskeletal: Negative for arthralgias, back pain, gait problem, joint swelling, myalgias, neck pain and neck stiffness.   Skin: Negative for color change and rash.        No hair changes, no nail changes   Allergic/Immunologic: Positive for environmental allergies. Negative for food allergies and immunocompromised state.   Neurological: Negative for dizziness, tremors, seizures, syncope, speech difficulty, weakness, light-headedness, numbness and headaches.   Hematological: Negative for adenopathy. Does not bruise/bleed easily.   Psychiatric/Behavioral: Negative for agitation, confusion, decreased concentration, dysphoric mood, sleep disturbance and suicidal ideas. The patient is not nervous/anxious.        Objective   Vitals:    05/22/23 0750   BP: 120/86   BP Location: Left arm   Patient Position:  "Sitting   Cuff Size: Adult   Pulse: 96   Temp: 97.7 °F (36.5 °C)   TempSrc: Temporal   SpO2: 98%   Weight: 102 kg (224 lb)   Height: 177.8 cm (70\")     Physical Exam  Constitutional:       General: He is not in acute distress.     Appearance: Normal appearance. He is not diaphoretic.   HENT:      Head: Normocephalic and atraumatic.      Right Ear: Tympanic membrane, ear canal and external ear normal.      Left Ear: Tympanic membrane, ear canal and external ear normal.      Nose: Nose normal. No rhinorrhea.      Mouth/Throat:      Mouth: Mucous membranes are moist.      Pharynx: Oropharynx is clear.   Eyes:      General:         Right eye: No discharge.         Left eye: No discharge.      Conjunctiva/sclera: Conjunctivae normal.   Cardiovascular:      Rate and Rhythm: Normal rate and regular rhythm.      Pulses: Normal pulses.      Heart sounds: Normal heart sounds.   Pulmonary:      Effort: Pulmonary effort is normal.      Breath sounds: Normal breath sounds.   Abdominal:      General: Bowel sounds are normal.      Tenderness: There is no abdominal tenderness.   Musculoskeletal:         General: No swelling or tenderness.      Cervical back: Normal range of motion.   Skin:     General: Skin is warm and dry.   Neurological:      General: No focal deficit present.      Mental Status: He is alert and oriented to person, place, and time.   Psychiatric:         Mood and Affect: Mood normal.         Behavior: Behavior normal.         Judgment: Judgment normal.         Assessment & Plan   Diagnoses and all orders for this visit:    1. PE (physical exam), annual (Primary)  -     CBC (No Diff)  -     Comprehensive Metabolic Panel  -     Lipid Panel  -     TSH  -     Urinalysis With Microscopic If Indicated (No Culture) - Urine, Clean Catch    2. Pure hypercholesterolemia  Assessment & Plan:  He denies myalgias with Vytorin which he will continue along with a low-fat, low-cholesterol diet.   Lab Results   Component Value " Date     (H) 04/21/2022         3. Gastroesophageal reflux disease without esophagitis  Assessment & Plan:  Symptoms managed with daily pantoprazole, EGD performed 6/17/2020 by Dr. oV.  He takes Tums only as needed for breakthrough symptoms.      4. Generalized anxiety disorder  Assessment & Plan:  Symptoms are managed on daily Cymbalta, tolerating low-dose well.      5. Vitamin B12 deficiency due to intestinal malabsorption  Assessment & Plan:  He is managed on a daily B12 supplement, recheck level.    Orders:  -     Vitamin B12    6. Screening for prostate cancer  -     PSA Screen      Risk assessment:  He has a family hx (father) of CAD-he is managed on statin therapy.  His Body mass index is 32.14 kg/m². - He remains active and tries to follow a low-fat, low-cholesterol diet.    Prevention:  Health Maintenance   Topic Date Due   • COVID-19 Vaccine (5 - Booster for Pfizer series) 06/18/2022   • ANNUAL PHYSICAL  04/21/2023   • LIPID PANEL  04/21/2023   • INFLUENZA VACCINE  08/01/2023   • COLORECTAL CANCER SCREENING  06/17/2025   • TDAP/TD VACCINES (2 - Td or Tdap) 10/14/2029   • HEPATITIS C SCREENING  Completed   • ZOSTER VACCINE  Completed   • Pneumococcal Vaccine 0-64  Aged Out       Discussed healthy lifestyle choices such as maintaining a balanced diet low in carbohydrates and limiting caffeine and alcohol intake.  Recommended routine exercise for bone strength and cardiovascular health.

## 2023-05-23 LAB
VIT B12 SERPL-MCNC: 711 PG/ML (ref 211–946)
WRITTEN AUTHORIZATION: NORMAL

## 2023-08-16 DIAGNOSIS — E78.5 HYPERLIPIDEMIA, UNSPECIFIED HYPERLIPIDEMIA TYPE: ICD-10-CM

## 2023-08-17 RX ORDER — EZETIMIBE AND SIMVASTATIN 10; 40 MG/1; MG/1
TABLET ORAL
Qty: 90 TABLET | Refills: 1 | Status: SHIPPED | OUTPATIENT
Start: 2023-08-17

## 2023-09-21 ENCOUNTER — LAB (OUTPATIENT)
Dept: LAB | Facility: HOSPITAL | Age: 53
End: 2023-09-21

## 2023-09-21 DIAGNOSIS — E53.8 VITAMIN B12 DEFICIENCY DUE TO INTESTINAL MALABSORPTION: ICD-10-CM

## 2023-09-21 DIAGNOSIS — K90.9 VITAMIN B12 DEFICIENCY DUE TO INTESTINAL MALABSORPTION: ICD-10-CM

## 2023-09-21 DIAGNOSIS — Z85.060 PERSONAL HISTORY OF MALIGNANT CARCINOID TUMOR OF SMALL INTESTINE: Primary | ICD-10-CM

## 2023-09-21 DIAGNOSIS — Z85.060 PERSONAL HISTORY OF MALIGNANT CARCINOID TUMOR OF SMALL INTESTINE: ICD-10-CM

## 2023-09-21 LAB
ALBUMIN SERPL-MCNC: 4.6 G/DL (ref 3.5–5.2)
ALBUMIN/GLOB SERPL: 1.8 G/DL
ALP SERPL-CCNC: 62 U/L (ref 39–117)
ALT SERPL W P-5'-P-CCNC: 35 U/L (ref 1–41)
ANION GAP SERPL CALCULATED.3IONS-SCNC: 13.7 MMOL/L (ref 5–15)
AST SERPL-CCNC: 28 U/L (ref 1–40)
BASOPHILS # BLD AUTO: 0.05 10*3/MM3 (ref 0–0.2)
BASOPHILS NFR BLD AUTO: 0.7 % (ref 0–1.5)
BILIRUB SERPL-MCNC: 1 MG/DL (ref 0–1.2)
BUN SERPL-MCNC: 11 MG/DL (ref 6–20)
BUN/CREAT SERPL: 11.6 (ref 7–25)
CALCIUM SPEC-SCNC: 9.6 MG/DL (ref 8.6–10.5)
CHLORIDE SERPL-SCNC: 96 MMOL/L (ref 98–107)
CO2 SERPL-SCNC: 25.3 MMOL/L (ref 22–29)
CREAT SERPL-MCNC: 0.95 MG/DL (ref 0.7–1.3)
DEPRECATED RDW RBC AUTO: 41.7 FL (ref 37–54)
EGFRCR SERPLBLD CKD-EPI 2021: 95.1 ML/MIN/1.73
EOSINOPHIL # BLD AUTO: 0.06 10*3/MM3 (ref 0–0.4)
EOSINOPHIL NFR BLD AUTO: 0.8 % (ref 0.3–6.2)
ERYTHROCYTE [DISTWIDTH] IN BLOOD BY AUTOMATED COUNT: 12.6 % (ref 12.3–15.4)
GLOBULIN UR ELPH-MCNC: 2.6 GM/DL
GLUCOSE SERPL-MCNC: 97 MG/DL (ref 65–99)
HCT VFR BLD AUTO: 46.4 % (ref 37.5–51)
HGB BLD-MCNC: 15.7 G/DL (ref 13–17.7)
IMM GRANULOCYTES # BLD AUTO: 0.01 10*3/MM3 (ref 0–0.05)
IMM GRANULOCYTES NFR BLD AUTO: 0.1 % (ref 0–0.5)
LYMPHOCYTES # BLD AUTO: 2.69 10*3/MM3 (ref 0.7–3.1)
LYMPHOCYTES NFR BLD AUTO: 36.8 % (ref 19.6–45.3)
MCH RBC QN AUTO: 30.8 PG (ref 26.6–33)
MCHC RBC AUTO-ENTMCNC: 33.8 G/DL (ref 31.5–35.7)
MCV RBC AUTO: 91.2 FL (ref 79–97)
MONOCYTES # BLD AUTO: 0.6 10*3/MM3 (ref 0.1–0.9)
MONOCYTES NFR BLD AUTO: 8.2 % (ref 5–12)
NEUTROPHILS NFR BLD AUTO: 3.89 10*3/MM3 (ref 1.7–7)
NEUTROPHILS NFR BLD AUTO: 53.4 % (ref 42.7–76)
NRBC BLD AUTO-RTO: 0 /100 WBC (ref 0–0.2)
PLATELET # BLD AUTO: 262 10*3/MM3 (ref 140–450)
PMV BLD AUTO: 9.2 FL (ref 6–12)
POTASSIUM SERPL-SCNC: 5.1 MMOL/L (ref 3.5–5.2)
PROT SERPL-MCNC: 7.2 G/DL (ref 6–8.5)
RBC # BLD AUTO: 5.09 10*6/MM3 (ref 4.14–5.8)
SODIUM SERPL-SCNC: 135 MMOL/L (ref 136–145)
VIT B12 BLD-MCNC: 788 PG/ML (ref 211–946)
WBC NRBC COR # BLD: 7.3 10*3/MM3 (ref 3.4–10.8)

## 2023-09-21 PROCEDURE — 80053 COMPREHEN METABOLIC PANEL: CPT

## 2023-09-21 PROCEDURE — 85025 COMPLETE CBC W/AUTO DIFF WBC: CPT

## 2023-09-21 PROCEDURE — 36415 COLL VENOUS BLD VENIPUNCTURE: CPT

## 2023-09-21 PROCEDURE — 82607 VITAMIN B-12: CPT | Performed by: INTERNAL MEDICINE

## 2023-09-26 LAB — CGA SERPL-MCNC: 36.7 NG/ML (ref 0–101.8)

## 2023-09-28 ENCOUNTER — OFFICE VISIT (OUTPATIENT)
Dept: ONCOLOGY | Facility: CLINIC | Age: 53
End: 2023-09-28
Payer: COMMERCIAL

## 2023-09-28 VITALS
BODY MASS INDEX: 31.64 KG/M2 | DIASTOLIC BLOOD PRESSURE: 92 MMHG | WEIGHT: 221 LBS | OXYGEN SATURATION: 99 % | SYSTOLIC BLOOD PRESSURE: 130 MMHG | HEART RATE: 72 BPM | TEMPERATURE: 98.4 F | RESPIRATION RATE: 16 BRPM | HEIGHT: 70 IN

## 2023-09-28 DIAGNOSIS — E53.8 VITAMIN B12 DEFICIENCY DUE TO INTESTINAL MALABSORPTION: ICD-10-CM

## 2023-09-28 DIAGNOSIS — Z85.060 PERSONAL HISTORY OF MALIGNANT CARCINOID TUMOR OF SMALL INTESTINE: Primary | ICD-10-CM

## 2023-09-28 DIAGNOSIS — K90.9 VITAMIN B12 DEFICIENCY DUE TO INTESTINAL MALABSORPTION: ICD-10-CM

## 2023-09-28 NOTE — PROGRESS NOTES
Subjective     REASON FOR FOLLOW UP:  RESECTED CARCINOID TUMOR of ILEUM    HISTORY OF PRESENT ILLNESS:  The patient is a 53 y.o. year old male who was having some abdominal complaints which led to a colonoscopy with Dr. Vo on 8/16/2016.  He was found to have a polyp in the terminal ileum which was biopsied and showed carcinoid tumor.  He subsequently underwent a staging CT scan of the abdomen and pelvis on 8/26/2016 which showed no evidence of distant metastases but did show a 1.7 cm regional lymph node.    The patient underwent a laparoscopic right hemicolectomy with resection of the terminal ileum and appendectomy performed by Dr Bedolla on 8/29/2016.  The pathology from that specimen as noted below showed an 8 mm carcinoid tumor in the terminal ileum with 1 out of 16 lymph nodes positive for metastatic carcinoid tumor (T3 N1 M0).    He was seen for initial consultation in our office in September 2016.  It was determined that he did not require any adjuvant treatment but we set up a surveillance schedule every 6 months or so to continue to monitor for recurrence.  When he reached the 5-year follow-up point we went to an annual follow-up schedule.    INTERVAL HISTORY:  He returns today for his routine annual follow-up visit with labs.  He seems to be getting along well.  His labs performed last week on 9/21/2023 showed a normal chromogranin a level of 46.7.  His B12 level was normal also at 788.  CBC was completely normal.    His last colonoscopy with Dr. Vo was on 6/17/2020.  The next scope will be due in June 2025.    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    Colon polyp     Diarrhea     Diverticulitis of colon     Diverticulosis     Dizziness     GERD (gastroesophageal reflux disease)     Headache     Heart palpitations     Patient takes Atenolol    History  of kidney stones     Hypercholesterolemia     Hypertension     Low back pain     Nephrolithiasis         Past Surgical History:   Procedure Laterality Date    APPENDECTOMY      COLON RESECTION Right 8/29/2016    Procedure: COLON RESECTION RIGHT LAPAROSCOPIC;  Surgeon: Baron Bedolla MD;  Location: [facility];  Service:     COLONOSCOPY  05/21/2009    ih, tics     COLONOSCOPY N/A 8/16/2016    NBIH, tics, one ileal polyp in terminal ileum, carcinoid tumor, TA w/low grade dysplasia    COLONOSCOPY N/A 8/31/2017    - Patent side-to-side ileo-colonic anastomosis, characterized by inflammation, ulceration, visible sutures,congestion, edema, erosion and erythema- Diverticulosis in the sigmoid colon, in the descending colon, at the splenic flexure, in the transverse colon and atthe hepatic flexure.    SMALL INTESTINE SURGERY  8/26/16    Bowel resection carinoid tumor    TONSILLECTOMY      CHILDHOOD        ALLERGIES:    Allergies   Allergen Reactions    Penicillins      Patient unsure had as a child         Review of Systems   Constitutional:  Negative for activity change, appetite change, fatigue, fever and unexpected weight change.   HENT:  Negative for hearing loss, nosebleeds, trouble swallowing and voice change.    Eyes:  Negative for visual disturbance.   Respiratory:  Negative for cough, shortness of breath and wheezing.    Cardiovascular:  Negative for chest pain and palpitations.   Gastrointestinal:  Negative for abdominal pain, diarrhea, nausea and vomiting.        Acid reflux   Genitourinary:  Negative for difficulty urinating, frequency, hematuria and urgency.   Musculoskeletal:  Negative for back pain and neck pain.   Skin:  Negative for rash.   Neurological:  Negative for dizziness, seizures, syncope and headaches.   Hematological:  Negative for adenopathy. Does not bruise/bleed easily.   Psychiatric/Behavioral:  Negative for behavioral problems. The patient is not nervous/anxious.      Objective     There  were no vitals filed for this visit.        10/13/2022     4:20 PM   Current Status   ECOG score 0       Physical Exam   Constitutional: He is oriented to person, place, and time. He appears well-developed. No distress.   HENT:   Head: Normocephalic.   Eyes: Pupils are equal, round, and reactive to light. Conjunctivae are normal. No scleral icterus.   Neck: No JVD present. No thyromegaly present.   Cardiovascular: Normal rate and regular rhythm. Exam reveals no gallop and no friction rub.   No murmur heard.  Pulmonary/Chest: Effort normal and breath sounds normal. He has no wheezes. He has no rales.   Abdominal: Soft. He exhibits no distension and no mass. There is no abdominal tenderness.   Healed surgical scar RLQ.   Musculoskeletal: Normal range of motion. No deformity.   Lymphadenopathy:     He has no cervical adenopathy.   Neurological: He is alert and oriented to person, place, and time. He has normal reflexes. No cranial nerve deficit.   Skin: Skin is warm and dry. No rash noted. No erythema.   Psychiatric: His behavior is normal. Judgment normal. I have reexamined the patient and the results are consistent with the previously documented exam. Tucker Buenrostro MD       RECENT LABS:  Hematology WBC   Date Value Ref Range Status   09/21/2023 7.30 3.40 - 10.80 10*3/mm3 Final   05/22/2023 5.76 3.40 - 10.80 10*3/mm3 Final     RBC   Date Value Ref Range Status   09/21/2023 5.09 4.14 - 5.80 10*6/mm3 Final   05/22/2023 5.16 4.14 - 5.80 10*6/mm3 Final     Hemoglobin   Date Value Ref Range Status   09/21/2023 15.7 13.0 - 17.7 g/dL Final     Hematocrit   Date Value Ref Range Status   09/21/2023 46.4 37.5 - 51.0 % Final     Platelets   Date Value Ref Range Status   09/21/2023 262 140 - 450 10*3/mm3 Final        Lab Results   Component Value Date    [id number] 788 09/21/2023 9/21/2023  CHROMOGRANIN A  0.0 - 101.8 ng/mL 36.7 43.6 .6 High  .6 High  CM 82.2 CM       Lab Results   Component Value Date     GLUCOSE 97 09/21/2023    BUN 11 09/21/2023    CREATININE 0.95 09/21/2023    EGFRIFNONA 89 09/22/2021    EGFRIFAFRI 120 06/14/2021    BCR 11.6 09/21/2023    K 5.1 09/21/2023    CO2 25.3 09/21/2023    CALCIUM 9.6 09/21/2023    PROTENTOTREF 7.0 05/22/2023    ALBUMIN 4.6 09/21/2023    LABIL2 2.2 05/22/2023    AST 28 09/21/2023    ALT 35 09/21/2023     CT CHEST, ABDOMEN, AND PELVIS WITH IV CONTRAST 9/22/2021  IMPRESSION:  Tiny nonobstructing left renal calculus, unchanged since  09/23/2020. Otherwise unremarkable CT imaging of the chest, abdomen and  pelvis. There is no evidence of metastatic disease.      Assessment & Plan     1.  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  Patient has no clinical evidence of recurrent or metastatic disease with a normal chromogranin A level and no new symptoms now 7 years out from his partial small bowel resection..    2.  Resection of the terminal ileum putting him at risk for future B12 deficiency.  He has now on daily B12 replacement and his B12 level is remaining within normal limits.    Plan  1.  We reviewed the results of his labs from 9/21/2023 with the patient and provided him a printed copy.  He continues to do well and will continue follow-up on an annual basis.  2.  Continue B12 tablets as maintenance 1000 µg by mouth daily.   3.  He will return for M.D. follow-up in 12 months and again will have labs performed 1 week prior to visit.  We decided to order scans on an as-needed basis from here on.  Certainly if he has any new symptoms or significant change in his labs we will order repeat scans at that time.  He knows to interrupt his proton pump inhibitor therapy prior to undergoing the lab draw for chromogranin A.  4.  He will follow-up with Dr. Vo for further surveillance colonoscopies.  Next scope will be due in June 2025

## 2023-09-28 NOTE — LETTER
September 28, 2023     FELIPE Johnson  [address] [place], [place] [id number]    Patient: Bill Cano   YOB: 1970   Date of Visit: 9/28/2023     Dear FELIPE Johnson:       Thank you for referring Bill Cano to me for evaluation. Below are the relevant portions of my assessment and plan of care.    If you have questions, please do not hesitate to call me. I look forward to following Bill along with you.         Sincerely,        Tucker Buenrostro MD        CC: No Recipients    Tucker Buenrostro MD  09/28/23 1324  Sign when Signing Visit    Subjective     REASON FOR FOLLOW UP:  RESECTED CARCINOID TUMOR of ILEUM    HISTORY OF PRESENT ILLNESS:  The patient is a 53 y.o. year old male who was having some abdominal complaints which led to a colonoscopy with Dr. Vo on 8/16/2016.  He was found to have a polyp in the terminal ileum which was biopsied and showed carcinoid tumor.  He subsequently underwent a staging CT scan of the abdomen and pelvis on 8/26/2016 which showed no evidence of distant metastases but did show a 1.7 cm regional lymph node.    The patient underwent a laparoscopic right hemicolectomy with resection of the terminal ileum and appendectomy performed by Dr Bedolla on 8/29/2016.  The pathology from that specimen as noted below showed an 8 mm carcinoid tumor in the terminal ileum with 1 out of 16 lymph nodes positive for metastatic carcinoid tumor (T3 N1 M0).    He was seen for initial consultation in our office in September 2016.  It was determined that he did not require any adjuvant treatment but we set up a surveillance schedule every 6 months or so to continue to monitor for recurrence.  When he reached the 5-year follow-up point we went to an annual follow-up schedule.    INTERVAL HISTORY:  He returns today for his routine annual follow-up visit with labs.  He seems to be getting along well.  His labs performed last week on 9/21/2023 showed a normal  chromogranin a level of 46.7.  His B12 level was normal also at 788.  CBC was completely normal.    His last colonoscopy with Dr. Vo was on 6/17/2020.  The next scope will be due in June 2025.    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   • Colon polyp    • Diarrhea    • Diverticulitis of colon    • Diverticulosis    • Dizziness    • GERD (gastroesophageal reflux disease)    • Headache    • Heart palpitations     Patient takes Atenolol   • History of kidney stones    • Hypercholesterolemia    • Hypertension    • Low back pain    • Nephrolithiasis         Past Surgical History:   Procedure Laterality Date   • APPENDECTOMY     • COLON RESECTION Right 8/29/2016    Procedure: COLON RESECTION RIGHT LAPAROSCOPIC;  Surgeon: Baron Bedolla MD;  Location: [facility];  Service:    • COLONOSCOPY  05/21/2009    ih, tics    • COLONOSCOPY N/A 8/16/2016    NBIH, tics, one ileal polyp in terminal ileum, carcinoid tumor, TA w/low grade dysplasia   • COLONOSCOPY N/A 8/31/2017    - Patent side-to-side ileo-colonic anastomosis, characterized by inflammation, ulceration, visible sutures,congestion, edema, erosion and erythema- Diverticulosis in the sigmoid colon, in the descending colon, at the splenic flexure, in the transverse colon and atthe hepatic flexure.   • SMALL INTESTINE SURGERY  8/26/16    Bowel resection carinoid tumor   • TONSILLECTOMY      CHILDHOOD        ALLERGIES:    Allergies   Allergen Reactions   • Penicillins      Patient unsure had as a child         Review of Systems   Constitutional:  Negative for activity change, appetite change, fatigue, fever and unexpected weight change.   HENT:  Negative for hearing loss, nosebleeds, trouble swallowing and voice change.    Eyes:  Negative for visual disturbance.   Respiratory:  Negative for cough, shortness of breath  and wheezing.    Cardiovascular:  Negative for chest pain and palpitations.   Gastrointestinal:  Negative for abdominal pain, diarrhea, nausea and vomiting.        Acid reflux   Genitourinary:  Negative for difficulty urinating, frequency, hematuria and urgency.   Musculoskeletal:  Negative for back pain and neck pain.   Skin:  Negative for rash.   Neurological:  Negative for dizziness, seizures, syncope and headaches.   Hematological:  Negative for adenopathy. Does not bruise/bleed easily.   Psychiatric/Behavioral:  Negative for behavioral problems. The patient is not nervous/anxious.      Objective     There were no vitals filed for this visit.        10/13/2022     4:20 PM   Current Status   ECOG score 0       Physical Exam   Constitutional: He is oriented to person, place, and time. He appears well-developed. No distress.   HENT:   Head: Normocephalic.   Eyes: Pupils are equal, round, and reactive to light. Conjunctivae are normal. No scleral icterus.   Neck: No JVD present. No thyromegaly present.   Cardiovascular: Normal rate and regular rhythm. Exam reveals no gallop and no friction rub.   No murmur heard.  Pulmonary/Chest: Effort normal and breath sounds normal. He has no wheezes. He has no rales.   Abdominal: Soft. He exhibits no distension and no mass. There is no abdominal tenderness.   Healed surgical scar RLQ.   Musculoskeletal: Normal range of motion. No deformity.   Lymphadenopathy:     He has no cervical adenopathy.   Neurological: He is alert and oriented to person, place, and time. He has normal reflexes. No cranial nerve deficit.   Skin: Skin is warm and dry. No rash noted. No erythema.   Psychiatric: His behavior is normal. Judgment normal. I have reexamined the patient and the results are consistent with the previously documented exam. Tucker Buenrostro MD       RECENT LABS:  Hematology WBC   Date Value Ref Range Status   09/21/2023 7.30 3.40 - 10.80 10*3/mm3 Final   05/22/2023 5.76 3.40 - 10.80  10*3/mm3 Final     RBC   Date Value Ref Range Status   09/21/2023 5.09 4.14 - 5.80 10*6/mm3 Final   05/22/2023 5.16 4.14 - 5.80 10*6/mm3 Final     Hemoglobin   Date Value Ref Range Status   09/21/2023 15.7 13.0 - 17.7 g/dL Final     Hematocrit   Date Value Ref Range Status   09/21/2023 46.4 37.5 - 51.0 % Final     Platelets   Date Value Ref Range Status   09/21/2023 262 140 - 450 10*3/mm3 Final        Lab Results   Component Value Date    [id number] 788 09/21/2023 9/21/2023  CHROMOGRANIN A  0.0 - 101.8 ng/mL 36.7 43.6 .6 High  .6 High  CM 82.2 CM       Lab Results   Component Value Date    GLUCOSE 97 09/21/2023    BUN 11 09/21/2023    CREATININE 0.95 09/21/2023    EGFRIFNONA 89 09/22/2021    EGFRIFAFRI 120 06/14/2021    BCR 11.6 09/21/2023    K 5.1 09/21/2023    CO2 25.3 09/21/2023    CALCIUM 9.6 09/21/2023    PROTENTOTREF 7.0 05/22/2023    ALBUMIN 4.6 09/21/2023    LABIL2 2.2 05/22/2023    AST 28 09/21/2023    ALT 35 09/21/2023     CT CHEST, ABDOMEN, AND PELVIS WITH IV CONTRAST 9/22/2021  IMPRESSION:  Tiny nonobstructing left renal calculus, unchanged since  09/23/2020. Otherwise unremarkable CT imaging of the chest, abdomen and  pelvis. There is no evidence of metastatic disease.      Assessment & Plan     1.  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  Patient has no clinical evidence of recurrent or metastatic disease with a normal chromogranin A level and no new symptoms now 7 years out from his partial small bowel resection..    2.  Resection of the terminal ileum putting him at risk for future B12 deficiency.  He has now on daily B12 replacement and his B12 level is remaining within normal limits.    Plan  1.  We reviewed the results of his labs from 9/21/2023 with the patient and provided him a printed copy.  He continues to do well and will continue follow-up on an annual basis.  2.  Continue B12 tablets as maintenance  1000 µg by mouth daily.   3.  He will return for M.D. follow-up in 12 months and again will have labs performed 1 week prior to visit.  We decided to order scans on an as-needed basis from here on.  Certainly if he has any new symptoms or significant change in his labs we will order repeat scans at that time.  He knows to interrupt his proton pump inhibitor therapy prior to undergoing the lab draw for chromogranin A.  4.  He will follow-up with Dr. Vo for further surveillance colonoscopies.  Next scope will be due in June 2025

## 2023-12-03 DIAGNOSIS — R10.13 EPIGASTRIC PAIN: ICD-10-CM

## 2023-12-03 DIAGNOSIS — F41.9 ANXIETY: ICD-10-CM

## 2023-12-04 RX ORDER — DULOXETIN HYDROCHLORIDE 30 MG/1
CAPSULE, DELAYED RELEASE ORAL
Qty: 90 CAPSULE | Refills: 2 | Status: SHIPPED | OUTPATIENT
Start: 2023-12-04

## 2023-12-04 RX ORDER — PANTOPRAZOLE SODIUM 40 MG/1
TABLET, DELAYED RELEASE ORAL
Qty: 90 TABLET | Refills: 1 | Status: SHIPPED | OUTPATIENT
Start: 2023-12-04

## 2023-12-05 DIAGNOSIS — R00.2 PALPITATIONS: ICD-10-CM

## 2023-12-05 RX ORDER — ATENOLOL 25 MG/1
TABLET ORAL
Qty: 45 TABLET | Refills: 1 | Status: SHIPPED | OUTPATIENT
Start: 2023-12-05

## 2024-01-29 ENCOUNTER — OFFICE VISIT (OUTPATIENT)
Dept: INTERNAL MEDICINE | Facility: CLINIC | Age: 54
End: 2024-01-29
Payer: COMMERCIAL

## 2024-01-29 VITALS
HEART RATE: 77 BPM | HEIGHT: 70 IN | BODY MASS INDEX: 32.56 KG/M2 | SYSTOLIC BLOOD PRESSURE: 118 MMHG | OXYGEN SATURATION: 98 % | WEIGHT: 227.4 LBS | DIASTOLIC BLOOD PRESSURE: 82 MMHG

## 2024-01-29 DIAGNOSIS — E53.8 VITAMIN B12 DEFICIENCY DUE TO INTESTINAL MALABSORPTION: Chronic | ICD-10-CM

## 2024-01-29 DIAGNOSIS — E78.00 PURE HYPERCHOLESTEROLEMIA: Primary | Chronic | ICD-10-CM

## 2024-01-29 DIAGNOSIS — K90.9 VITAMIN B12 DEFICIENCY DUE TO INTESTINAL MALABSORPTION: Chronic | ICD-10-CM

## 2024-01-29 DIAGNOSIS — K21.9 GASTROESOPHAGEAL REFLUX DISEASE WITHOUT ESOPHAGITIS: Chronic | ICD-10-CM

## 2024-01-29 DIAGNOSIS — F41.1 GENERALIZED ANXIETY DISORDER: Chronic | ICD-10-CM

## 2024-01-29 LAB
ALBUMIN SERPL-MCNC: 4.9 G/DL (ref 3.5–5.2)
ALBUMIN/GLOB SERPL: 2.2 G/DL
ALP SERPL-CCNC: 63 U/L (ref 39–117)
ALT SERPL-CCNC: 33 U/L (ref 1–41)
AST SERPL-CCNC: 21 U/L (ref 1–40)
BILIRUB SERPL-MCNC: 0.9 MG/DL (ref 0–1.2)
BUN SERPL-MCNC: 13 MG/DL (ref 6–20)
BUN/CREAT SERPL: 16.7 (ref 7–25)
CALCIUM SERPL-MCNC: 10.2 MG/DL (ref 8.6–10.5)
CHLORIDE SERPL-SCNC: 100 MMOL/L (ref 98–107)
CHOLEST SERPL-MCNC: 190 MG/DL (ref 0–200)
CO2 SERPL-SCNC: 29.2 MMOL/L (ref 22–29)
CREAT SERPL-MCNC: 0.78 MG/DL (ref 0.76–1.27)
EGFRCR SERPLBLD CKD-EPI 2021: 106 ML/MIN/1.73
GLOBULIN SER CALC-MCNC: 2.2 GM/DL
GLUCOSE SERPL-MCNC: 85 MG/DL (ref 65–99)
HDLC SERPL-MCNC: 69 MG/DL (ref 40–60)
LDLC SERPL CALC-MCNC: 98 MG/DL (ref 0–100)
POTASSIUM SERPL-SCNC: 5.2 MMOL/L (ref 3.5–5.2)
PROT SERPL-MCNC: 7.1 G/DL (ref 6–8.5)
SODIUM SERPL-SCNC: 139 MMOL/L (ref 136–145)
TRIGL SERPL-MCNC: 131 MG/DL (ref 0–150)
VLDLC SERPL CALC-MCNC: 23 MG/DL (ref 5–40)

## 2024-01-29 PROCEDURE — 99214 OFFICE O/P EST MOD 30 MIN: CPT | Performed by: NURSE PRACTITIONER

## 2024-01-29 NOTE — PROGRESS NOTES
"Chief Complaint  No chief complaint on file.  Subjective        Bill Cano presents to [facility] for f/u regarding hyperlipidemia, GERD and anxiety.    History of Present Illness    He saw Dr. Buenrostro 9/2023 who has managed his care since he was diagnosed with colon cancer in 2016. We will be transitioning to Dr. Noland in September with Dr. Buenrostro's upcoming assisted. He has done well, no longer receiving annual CT scans. His last colonoscopy was 2020.    He has been taking more Tums lately due to increased indigestion. He takes pantoprazole every day. Denies abdominal pain.    He did get a bite guard from his dentist which has been helpful with daily headaches. He continues to have an intermittent piercing sensation on the right side of his forehead, extends into head. He states the sensation lasts a few seconds and then resolves. His MRi of the brain 1/2023 did not show any acute abnl. No vision changes. He sates the pain is often triggered by touching his right eye/washing face.    He has not been taking ibuprofen. He takes 1 Tylenol once a week.    Supplemental Information:  His blood pressure is 118/82, which is excellent. EGD is due next year. It was normal in 2020. His last CT scan was in 2021.     Objective   Vital Signs:  /82 (BP Location: Left arm, Patient Position: Sitting, Cuff Size: Adult)   Pulse 77   Ht 177.8 cm (70\")   Wt 103 kg (227 lb 6.4 oz)   SpO2 98%   BMI 32.63 kg/m²   Estimated body mass index is 32.63 kg/m² as calculated from the following:    Height as of this encounter: 177.8 cm (70\").    Weight as of this encounter: 103 kg (227 lb 6.4 oz).            Physical Exam  Vitals (Blood pressure is 118/82.) reviewed.   Constitutional:       Appearance: He is well-developed. He is not ill-appearing.   HENT:      Head: Normocephalic.      Right Ear: Hearing, tympanic membrane and external ear normal.      Left Ear: Hearing, tympanic membrane and external " ear normal.      Nose: Nose normal. No nasal deformity, mucosal edema or rhinorrhea.      Right Sinus: No maxillary sinus tenderness or frontal sinus tenderness.      Left Sinus: No maxillary sinus tenderness or frontal sinus tenderness.      Mouth/Throat:      Dentition: Normal dentition.   Eyes:      General: Lids are normal.         Right eye: No discharge.         Left eye: No discharge.      Conjunctiva/sclera: Conjunctivae normal.      Right eye: No exudate.     Left eye: No exudate.  Neck:      Thyroid: No thyroid mass or thyromegaly.      Vascular: No carotid bruit.      Trachea: Trachea normal.   Cardiovascular:      Rate and Rhythm: Regular rhythm.      Pulses: Normal pulses.      Heart sounds: Normal heart sounds. No murmur heard.  Pulmonary:      Effort: No respiratory distress.      Breath sounds: Normal breath sounds. No decreased breath sounds, wheezing, rhonchi or rales.   Abdominal:      General: Bowel sounds are normal.      Palpations: Abdomen is soft.      Tenderness: There is no abdominal tenderness.   Musculoskeletal:      Cervical back: Normal range of motion. No edema.   Lymphadenopathy:      Head:      Right side of head: No submental, submandibular, tonsillar, preauricular, posterior auricular or occipital adenopathy.      Left side of head: No submental, submandibular, tonsillar, preauricular, posterior auricular or occipital adenopathy.   Skin:     General: Skin is warm and dry.      Nails: There is no clubbing.   Neurological:      Mental Status: He is alert.   Psychiatric:         Behavior: Behavior is cooperative.        Result Review :  The following data was reviewed by: FELIPE Nj on 01/29/2024:  Common labs          5/22/2023    08:26 9/21/2023    15:20 9/21/2023    15:25 1/29/2024    15:55   Common Labs   Glucose 120   97  85    BUN 22   11  13    Creatinine 1.29   0.95  0.78    Sodium 140   135  139    Potassium 5.4   5.1  5.2    Chloride 102   96  100    Calcium 10.2    9.6  10.2    Total Protein 7.0    7.1    Albumin 4.8   4.6  4.9    Total Bilirubin 0.6   1.0  0.9    Alkaline Phosphatase 68   62  63    AST (SGOT) 28   28  21    ALT (SGPT) 37   35  33    WBC 5.76  7.30      Hemoglobin 15.4  15.7      Hematocrit 45.7  46.4      Platelets 233  262      Total Cholesterol 172    190    Triglycerides 190    131    HDL Cholesterol 55    69    LDL Cholesterol  85    98    PSA 1.060         Data reviewed : Consultant notes oncology 9/2023               Assessment and Plan   Diagnoses and all orders for this visit:    1. Pure hypercholesterolemia (Primary)  Assessment & Plan:  He denies myalgias with Vytorin which he will continue along with a low-fat, low-cholesterol diet. Recheck lipid panel today.    Orders:  -     Lipid Panel  -     Comprehensive Metabolic Panel    2. Gastroesophageal reflux disease without esophagitis  Assessment & Plan:  He notes a recent exacerbation of sx, taking Tums as needed. Continue Protonix daily as well. RTC if sx continue to worsen-EGD due next year.      3. Vitamin B12 deficiency due to intestinal malabsorption  Assessment & Plan:  Continue daily Vitamin B12 supplement      4. Generalized anxiety disorder  Assessment & Plan:  Symptoms are managed on daily Cymbalta, tolerating low-dose well.             Follow Up   Return in about 6 months (around 7/29/2024) for Annual physical.  Patient was given instructions and counseling regarding his condition or for health maintenance advice. Please see specific information pulled into the AVS if appropriate.      Transcribed from ambient dictation for FELIPE Nj by Elizabeth Colin.  01/29/24   16:31 EST    Patient or patient representative verbalized consent to the visit recording.  I have personally performed the services described in this document as transcribed by the above individual, and it is both accurate and complete.

## 2024-01-30 NOTE — ASSESSMENT & PLAN NOTE
He notes a recent exacerbation of sx, taking Tums as needed. Continue Protonix daily as well. RTC if sx continue to worsen-EGD due next year.

## 2024-01-30 NOTE — ASSESSMENT & PLAN NOTE
He denies myalgias with Vytorin which he will continue along with a low-fat, low-cholesterol diet. Recheck lipid panel today.

## 2024-03-01 DIAGNOSIS — E78.5 HYPERLIPIDEMIA, UNSPECIFIED HYPERLIPIDEMIA TYPE: ICD-10-CM

## 2024-03-04 RX ORDER — EZETIMIBE AND SIMVASTATIN 10; 40 MG/1; MG/1
TABLET ORAL
Qty: 90 TABLET | Refills: 1 | Status: SHIPPED | OUTPATIENT
Start: 2024-03-04

## 2024-06-23 DIAGNOSIS — R00.2 PALPITATIONS: ICD-10-CM

## 2024-06-23 DIAGNOSIS — R10.13 EPIGASTRIC PAIN: ICD-10-CM

## 2024-06-24 RX ORDER — ATENOLOL 25 MG/1
TABLET ORAL
Qty: 45 TABLET | Refills: 1 | Status: SHIPPED | OUTPATIENT
Start: 2024-06-24

## 2024-06-24 RX ORDER — PANTOPRAZOLE SODIUM 40 MG/1
TABLET, DELAYED RELEASE ORAL
Qty: 90 TABLET | Refills: 1 | Status: SHIPPED | OUTPATIENT
Start: 2024-06-24

## 2024-08-05 ENCOUNTER — OFFICE VISIT (OUTPATIENT)
Dept: INTERNAL MEDICINE | Facility: CLINIC | Age: 54
End: 2024-08-05
Payer: COMMERCIAL

## 2024-08-05 VITALS
HEART RATE: 71 BPM | SYSTOLIC BLOOD PRESSURE: 120 MMHG | HEIGHT: 70 IN | DIASTOLIC BLOOD PRESSURE: 84 MMHG | WEIGHT: 225.6 LBS | OXYGEN SATURATION: 99 % | BODY MASS INDEX: 32.3 KG/M2 | TEMPERATURE: 97.6 F

## 2024-08-05 DIAGNOSIS — K90.9 VITAMIN B12 DEFICIENCY DUE TO INTESTINAL MALABSORPTION: Chronic | ICD-10-CM

## 2024-08-05 DIAGNOSIS — Z00.00 PHYSICAL EXAM: Primary | ICD-10-CM

## 2024-08-05 DIAGNOSIS — Z12.5 SCREENING FOR PROSTATE CANCER: ICD-10-CM

## 2024-08-05 DIAGNOSIS — M72.0 DUPUYTREN CONTRACTURE: ICD-10-CM

## 2024-08-05 DIAGNOSIS — E78.00 PURE HYPERCHOLESTEROLEMIA: Chronic | ICD-10-CM

## 2024-08-05 DIAGNOSIS — E53.8 VITAMIN B12 DEFICIENCY DUE TO INTESTINAL MALABSORPTION: Chronic | ICD-10-CM

## 2024-08-05 DIAGNOSIS — F41.1 GENERALIZED ANXIETY DISORDER: Chronic | ICD-10-CM

## 2024-08-05 LAB
ALBUMIN SERPL-MCNC: 4.5 G/DL (ref 3.5–5.2)
ALBUMIN/GLOB SERPL: 2.1 G/DL
ALP SERPL-CCNC: 60 U/L (ref 39–117)
ALT SERPL-CCNC: 26 U/L (ref 1–41)
AST SERPL-CCNC: 21 U/L (ref 1–40)
BILIRUB SERPL-MCNC: 0.6 MG/DL (ref 0–1.2)
BUN SERPL-MCNC: 18 MG/DL (ref 6–20)
BUN/CREAT SERPL: 21.2 (ref 7–25)
CALCIUM SERPL-MCNC: 9.9 MG/DL (ref 8.6–10.5)
CHLORIDE SERPL-SCNC: 102 MMOL/L (ref 98–107)
CHOLEST SERPL-MCNC: 200 MG/DL (ref 0–200)
CO2 SERPL-SCNC: 27.9 MMOL/L (ref 22–29)
CREAT SERPL-MCNC: 0.85 MG/DL (ref 0.76–1.27)
EGFRCR SERPLBLD CKD-EPI 2021: 102.6 ML/MIN/1.73
ERYTHROCYTE [DISTWIDTH] IN BLOOD BY AUTOMATED COUNT: 12.7 % (ref 12.3–15.4)
GLOBULIN SER CALC-MCNC: 2.1 GM/DL
GLUCOSE SERPL-MCNC: 105 MG/DL (ref 65–99)
HCT VFR BLD AUTO: 44.6 % (ref 37.5–51)
HDLC SERPL-MCNC: 62 MG/DL (ref 40–60)
HGB BLD-MCNC: 14.9 G/DL (ref 13–17.7)
LDLC SERPL CALC-MCNC: 104 MG/DL (ref 0–100)
MCH RBC QN AUTO: 30.3 PG (ref 26.6–33)
MCHC RBC AUTO-ENTMCNC: 33.4 G/DL (ref 31.5–35.7)
MCV RBC AUTO: 90.7 FL (ref 79–97)
PLATELET # BLD AUTO: 222 10*3/MM3 (ref 140–450)
POTASSIUM SERPL-SCNC: 5.3 MMOL/L (ref 3.5–5.2)
PROT SERPL-MCNC: 6.6 G/DL (ref 6–8.5)
PSA SERPL-MCNC: 0.68 NG/ML (ref 0–4)
RBC # BLD AUTO: 4.92 10*6/MM3 (ref 4.14–5.8)
SODIUM SERPL-SCNC: 139 MMOL/L (ref 136–145)
TRIGL SERPL-MCNC: 197 MG/DL (ref 0–150)
TSH SERPL DL<=0.005 MIU/L-ACNC: 3.98 UIU/ML (ref 0.27–4.2)
VIT B12 SERPL-MCNC: 603 PG/ML (ref 211–946)
VLDLC SERPL CALC-MCNC: 34 MG/DL (ref 5–40)
WBC # BLD AUTO: 5.92 10*3/MM3 (ref 3.4–10.8)

## 2024-08-05 PROCEDURE — 99396 PREV VISIT EST AGE 40-64: CPT | Performed by: NURSE PRACTITIONER

## 2024-08-05 NOTE — ASSESSMENT & PLAN NOTE
Discussed findings of left hand, he denies pain and has full range of motion of his left hand.  We will continue to monitor and consider further treatment if symptoms worsen.

## 2024-08-05 NOTE — PROGRESS NOTES
"Subjective   Bill Cano is a 54 y.o. male who is here for a physical exam.    History of Present Illness   History of Present Illness  The patient is a 54-year-old male who presents for evaluation of multiple medical concerns.    He has \"knots\" on his left palmar hand which are not tender. He maintains a good range of motion in his fingers and reports no pain.    He occasionally uses Tums for breakthrough symptoms, managed on pantoprazole daily.    He is currently on a low dose of duloxetine which he reports as effective. His stress levels are manageable.    He is on Vytorin for cholesterol management.    He reports no current issues with allergies although he did experience increased drainage last night.    He notes infrequent headaches which initially begin as an aura, sx resolve with taking Tylenol.    He wears a mouth guard which has been helpful with sharp pain in head (no acute abnl on MRI).    He experiences tingling in his plantar feet more pronounced on the right side. His job involves pushing and pulling which exacerbates his back discomfort. He occasionally experiences restless legs, which occasionally disrupt his sleep. He performs stretches 2 to 3 times a week which have been helpful.    SOCIAL HISTORY  He has been working at [employer] for 37 years.        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/2016    Colon polyp     Diarrhea     Diverticulitis of colon     Diverticulosis     Dizziness     GERD (gastroesophageal reflux disease)     Headache     Heart palpitations     Patient takes Atenolol    History of kidney stones     Hypercholesterolemia     Hypertension     Low back pain     Nephrolithiasis          Current Outpatient Medications:     atenolol (TENORMIN) 25 MG tablet, TAKE 1/2 TABLET EVERY      EVENING, Disp: 45 tablet, Rfl: 1    calcium carbonate EX (TUMS EX) 750 MG chewable tablet, Chew 1 " tablet As Needed for Indigestion or Heartburn., Disp: , Rfl:     DULoxetine (CYMBALTA) 30 MG capsule, TAKE 1 CAPSULE DAILY, Disp: 90 capsule, Rfl: 2    ezetimibe-simvastatin (VYTORIN) 10-40 MG per tablet, TAKE 1 TABLET DAILY., Disp: 90 tablet, Rfl: 1    pantoprazole (PROTONIX) 40 MG EC tablet, TAKE 1 TABLET DAILY, Disp: 90 tablet, Rfl: 1    vitamin B-12 (CYANOCOBALAMIN) 1000 MCG tablet, Take 1 tablet by mouth Daily., Disp: 90 tablet, Rfl: 3    Allergies   Allergen Reactions    Penicillins      Patient unsure had as a child       Review of Systems   Constitutional:  Negative for activity change, appetite change, chills, diaphoresis, fatigue, fever and unexpected weight change.   HENT:  Positive for congestion and postnasal drip. Negative for dental problem, drooling, ear discharge, ear pain, facial swelling, hearing loss, mouth sores, nosebleeds, rhinorrhea, sinus pressure, sore throat, tinnitus and trouble swallowing.    Eyes:  Negative for photophobia, pain, discharge, redness, itching and visual disturbance.   Respiratory:  Negative for apnea, cough, choking, chest tightness, shortness of breath and wheezing.    Cardiovascular:  Negative for chest pain, palpitations and leg swelling.        No orthopnea, PND, MILLER   Gastrointestinal:  Negative for abdominal pain, blood in stool, constipation, diarrhea, nausea and vomiting.   Endocrine: Negative for cold intolerance, heat intolerance, polydipsia and polyuria.   Genitourinary:  Negative for decreased urine volume, dysuria, enuresis, flank pain, frequency, hematuria and urgency.   Musculoskeletal:  Negative for arthralgias, back pain, gait problem, joint swelling, myalgias, neck pain and neck stiffness.   Skin:  Negative for color change and rash.        No hair changes, no nail changes   Allergic/Immunologic: Negative for environmental allergies, food allergies and immunocompromised state.   Neurological:  Positive for numbness (tingling in plantar feet, right worse  "than left). Negative for dizziness, tremors, seizures, syncope, speech difficulty, weakness, light-headedness and headaches.   Hematological:  Negative for adenopathy. Does not bruise/bleed easily.   Psychiatric/Behavioral:  Negative for agitation, confusion, decreased concentration, dysphoric mood, sleep disturbance and suicidal ideas. The patient is not nervous/anxious.        Objective   Vitals:    08/05/24 0744   BP: 120/84   BP Location: Left arm   Patient Position: Sitting   Cuff Size: Large Adult   Pulse: 71   Temp: 97.6 °F (36.4 °C)   SpO2: 99%   Weight: 102 kg (225 lb 9.6 oz)   Height: 177.8 cm (70\")     Physical Exam  Constitutional:       General: He is not in acute distress.     Appearance: Normal appearance. He is not diaphoretic.   HENT:      Head: Normocephalic and atraumatic.      Right Ear: Tympanic membrane, ear canal and external ear normal.      Left Ear: Tympanic membrane, ear canal and external ear normal.      Nose: Nose normal. No rhinorrhea.      Mouth/Throat:      Mouth: Mucous membranes are moist.      Pharynx: Posterior oropharyngeal erythema present.   Eyes:      General:         Right eye: No discharge.         Left eye: No discharge.      Conjunctiva/sclera: Conjunctivae normal.   Cardiovascular:      Rate and Rhythm: Normal rate and regular rhythm.      Pulses: Normal pulses.      Heart sounds: Normal heart sounds.   Pulmonary:      Effort: Pulmonary effort is normal.      Breath sounds: Normal breath sounds.   Abdominal:      General: Bowel sounds are normal.      Tenderness: There is no abdominal tenderness.   Musculoskeletal:         General: No swelling or tenderness.      Left hand: Normal range of motion.      Cervical back: Normal range of motion.      Comments: Thickening of left palm along 4th finger   Skin:     General: Skin is warm and dry.   Neurological:      General: No focal deficit present.      Mental Status: He is alert and oriented to person, place, and time. "   Psychiatric:         Mood and Affect: Mood normal.         Behavior: Behavior normal.         Judgment: Judgment normal.       Physical Exam  Vital Signs  Vitals show a blood pressure of 128/84.    Results         Assessment & Plan   Diagnoses and all orders for this visit:    1. Physical exam (Primary)  -     CBC (No Diff)  -     Comprehensive Metabolic Panel  -     Lipid Panel  -     TSH    2. Vitamin B12 deficiency due to intestinal malabsorption  Assessment & Plan:  He is currently managed on a vitamin B12 supplement, recheck level today.    Orders:  -     Vitamin B12    3. Pure hypercholesterolemia  Assessment & Plan:  He denies myalgias with ezetimibe-simvastatin which he will continue along with a low-fat, low-cholesterol diet.   Lab Results   Component Value Date    LDL 98 01/29/2024           4. Generalized anxiety disorder  Assessment & Plan:  Symptoms managed with duloxetine daily, tolerating low-dose well and notes good control of symptoms.      5. Dupuytren contracture  Assessment & Plan:  Discussed findings of left hand, he denies pain and has full range of motion of his left hand.  We will continue to monitor and consider further treatment if symptoms worsen.      6. Screening for prostate cancer  -     PSA Screen      Assessment & Plan      Risk Assessment:  Family History   Problem Relation Age of Onset    Memory loss Mother     Asthma Mother     Heart disease Father     Colon polyps Father     Atrial fibrillation Brother     Malernestine Hyperthermia Neg Hx      His Body mass index is 32.37 kg/m²..      Prevention:  Health Maintenance   Topic Date Due    COVID-19 Vaccine (5 - 2023-24 season) 09/01/2023    ANNUAL PHYSICAL  05/22/2024    INFLUENZA VACCINE  08/01/2024    LIPID PANEL  01/29/2025    BMI FOLLOWUP  01/29/2025    COLORECTAL CANCER SCREENING  06/17/2025    TDAP/TD VACCINES (2 - Td or Tdap) 10/14/2029    HEPATITIS C SCREENING  Completed    ZOSTER VACCINE  Completed    Pneumococcal Vaccine 0-64   Aged Out       Discussed healthy lifestyle choices such as maintaining a balanced diet low in carbohydrates and limiting caffeine and alcohol intake.  Recommended routine exercise for bone strength and cardiovascular health.         Patient or patient representative verbalized consent for the use of Ambient Listening during the visit with  FELIPE Nj for chart documentation. 8/5/2024  08:27 EDT

## 2024-08-05 NOTE — ASSESSMENT & PLAN NOTE
Symptoms managed with duloxetine daily, tolerating low-dose well and notes good control of symptoms.

## 2024-08-05 NOTE — ASSESSMENT & PLAN NOTE
He denies myalgias with ezetimibe-simvastatin which he will continue along with a low-fat, low-cholesterol diet.   Lab Results   Component Value Date    LDL 98 01/29/2024

## 2024-09-03 DIAGNOSIS — F41.9 ANXIETY: ICD-10-CM

## 2024-09-03 DIAGNOSIS — E78.5 HYPERLIPIDEMIA, UNSPECIFIED HYPERLIPIDEMIA TYPE: ICD-10-CM

## 2024-09-04 RX ORDER — DULOXETIN HYDROCHLORIDE 30 MG/1
CAPSULE, DELAYED RELEASE ORAL
Qty: 90 CAPSULE | Refills: 1 | Status: SHIPPED | OUTPATIENT
Start: 2024-09-04

## 2024-09-04 RX ORDER — EZETIMIBE AND SIMVASTATIN 10; 40 MG/1; MG/1
TABLET ORAL
Qty: 90 TABLET | Refills: 1 | Status: SHIPPED | OUTPATIENT
Start: 2024-09-04

## 2024-09-20 ENCOUNTER — LAB (OUTPATIENT)
Dept: LAB | Facility: HOSPITAL | Age: 54
End: 2024-09-20
Payer: COMMERCIAL

## 2024-09-20 DIAGNOSIS — Z85.060 PERSONAL HISTORY OF MALIGNANT CARCINOID TUMOR OF SMALL INTESTINE: ICD-10-CM

## 2024-09-20 DIAGNOSIS — E53.8 VITAMIN B12 DEFICIENCY DUE TO INTESTINAL MALABSORPTION: ICD-10-CM

## 2024-09-20 DIAGNOSIS — K90.9 VITAMIN B12 DEFICIENCY DUE TO INTESTINAL MALABSORPTION: ICD-10-CM

## 2024-09-20 LAB
ALBUMIN SERPL-MCNC: 4.4 G/DL (ref 3.5–5.2)
ALBUMIN/GLOB SERPL: 1.5 G/DL
ALP SERPL-CCNC: 61 U/L (ref 39–117)
ALT SERPL W P-5'-P-CCNC: 30 U/L (ref 1–41)
ANION GAP SERPL CALCULATED.3IONS-SCNC: 9.3 MMOL/L (ref 5–15)
AST SERPL-CCNC: 25 U/L (ref 1–40)
BASOPHILS # BLD AUTO: 0.04 10*3/MM3 (ref 0–0.2)
BASOPHILS NFR BLD AUTO: 0.7 % (ref 0–1.5)
BILIRUB SERPL-MCNC: 0.6 MG/DL (ref 0–1.2)
BUN SERPL-MCNC: 16 MG/DL (ref 6–20)
BUN/CREAT SERPL: 18.4 (ref 7–25)
CALCIUM SPEC-SCNC: 9.3 MG/DL (ref 8.6–10.5)
CHLORIDE SERPL-SCNC: 103 MMOL/L (ref 98–107)
CO2 SERPL-SCNC: 26.7 MMOL/L (ref 22–29)
CREAT SERPL-MCNC: 0.87 MG/DL (ref 0.76–1.27)
DEPRECATED RDW RBC AUTO: 42 FL (ref 37–54)
EGFRCR SERPLBLD CKD-EPI 2021: 101.9 ML/MIN/1.73
EOSINOPHIL # BLD AUTO: 0.14 10*3/MM3 (ref 0–0.4)
EOSINOPHIL NFR BLD AUTO: 2.5 % (ref 0.3–6.2)
ERYTHROCYTE [DISTWIDTH] IN BLOOD BY AUTOMATED COUNT: 12.8 % (ref 12.3–15.4)
GLOBULIN UR ELPH-MCNC: 2.9 GM/DL
GLUCOSE SERPL-MCNC: 105 MG/DL (ref 65–99)
HCT VFR BLD AUTO: 44.1 % (ref 37.5–51)
HGB BLD-MCNC: 14.9 G/DL (ref 13–17.7)
IMM GRANULOCYTES # BLD AUTO: 0 10*3/MM3 (ref 0–0.05)
IMM GRANULOCYTES NFR BLD AUTO: 0 % (ref 0–0.5)
LYMPHOCYTES # BLD AUTO: 1.88 10*3/MM3 (ref 0.7–3.1)
LYMPHOCYTES NFR BLD AUTO: 34.1 % (ref 19.6–45.3)
MCH RBC QN AUTO: 30.3 PG (ref 26.6–33)
MCHC RBC AUTO-ENTMCNC: 33.8 G/DL (ref 31.5–35.7)
MCV RBC AUTO: 89.6 FL (ref 79–97)
MONOCYTES # BLD AUTO: 0.41 10*3/MM3 (ref 0.1–0.9)
MONOCYTES NFR BLD AUTO: 7.4 % (ref 5–12)
NEUTROPHILS NFR BLD AUTO: 3.05 10*3/MM3 (ref 1.7–7)
NEUTROPHILS NFR BLD AUTO: 55.3 % (ref 42.7–76)
NRBC BLD AUTO-RTO: 0 /100 WBC (ref 0–0.2)
PLATELET # BLD AUTO: 217 10*3/MM3 (ref 140–450)
PMV BLD AUTO: 8.4 FL (ref 6–12)
POTASSIUM SERPL-SCNC: 4.4 MMOL/L (ref 3.5–5.2)
PROT SERPL-MCNC: 7.3 G/DL (ref 6–8.5)
RBC # BLD AUTO: 4.92 10*6/MM3 (ref 4.14–5.8)
SODIUM SERPL-SCNC: 139 MMOL/L (ref 136–145)
VIT B12 BLD-MCNC: 799 PG/ML (ref 211–946)
WBC NRBC COR # BLD AUTO: 5.52 10*3/MM3 (ref 3.4–10.8)

## 2024-09-20 PROCEDURE — 36415 COLL VENOUS BLD VENIPUNCTURE: CPT

## 2024-09-20 PROCEDURE — 82607 VITAMIN B-12: CPT | Performed by: INTERNAL MEDICINE

## 2024-09-20 PROCEDURE — 80053 COMPREHEN METABOLIC PANEL: CPT

## 2024-09-20 PROCEDURE — 85025 COMPLETE CBC W/AUTO DIFF WBC: CPT

## 2024-09-24 LAB — CGA SERPL-MCNC: 50.7 NG/ML (ref 0–101.8)

## 2024-09-26 ENCOUNTER — OFFICE VISIT (OUTPATIENT)
Dept: ONCOLOGY | Facility: CLINIC | Age: 54
End: 2024-09-26
Payer: COMMERCIAL

## 2024-09-26 VITALS
OXYGEN SATURATION: 98 % | RESPIRATION RATE: 16 BRPM | HEART RATE: 75 BPM | DIASTOLIC BLOOD PRESSURE: 85 MMHG | WEIGHT: 218 LBS | HEIGHT: 70 IN | TEMPERATURE: 98.4 F | BODY MASS INDEX: 31.21 KG/M2 | SYSTOLIC BLOOD PRESSURE: 127 MMHG

## 2024-09-26 DIAGNOSIS — Z85.060 PERSONAL HISTORY OF MALIGNANT CARCINOID TUMOR OF SMALL INTESTINE: Primary | Chronic | ICD-10-CM

## 2024-09-26 DIAGNOSIS — E53.8 VITAMIN B12 DEFICIENCY DUE TO INTESTINAL MALABSORPTION: Chronic | ICD-10-CM

## 2024-09-26 DIAGNOSIS — K90.9 VITAMIN B12 DEFICIENCY DUE TO INTESTINAL MALABSORPTION: Chronic | ICD-10-CM

## 2024-09-26 RX ORDER — DIPHENOXYLATE HYDROCHLORIDE AND ATROPINE SULFATE 2.5; .025 MG/1; MG/1
TABLET ORAL DAILY
COMMUNITY

## 2025-01-26 DIAGNOSIS — R10.13 EPIGASTRIC PAIN: ICD-10-CM

## 2025-01-26 DIAGNOSIS — R00.2 PALPITATIONS: ICD-10-CM

## 2025-01-27 RX ORDER — PANTOPRAZOLE SODIUM 40 MG/1
TABLET, DELAYED RELEASE ORAL
Qty: 90 TABLET | Refills: 1 | Status: SHIPPED | OUTPATIENT
Start: 2025-01-27

## 2025-01-27 RX ORDER — ATENOLOL 25 MG/1
TABLET ORAL
Qty: 45 TABLET | Refills: 1 | Status: SHIPPED | OUTPATIENT
Start: 2025-01-27

## 2025-02-17 ENCOUNTER — OFFICE VISIT (OUTPATIENT)
Dept: INTERNAL MEDICINE | Facility: CLINIC | Age: 55
End: 2025-02-17
Payer: COMMERCIAL

## 2025-02-17 VITALS
OXYGEN SATURATION: 98 % | BODY MASS INDEX: 32.5 KG/M2 | WEIGHT: 227 LBS | TEMPERATURE: 98 F | HEART RATE: 79 BPM | HEIGHT: 70 IN | SYSTOLIC BLOOD PRESSURE: 122 MMHG | DIASTOLIC BLOOD PRESSURE: 82 MMHG

## 2025-02-17 DIAGNOSIS — E78.00 PURE HYPERCHOLESTEROLEMIA: Primary | Chronic | ICD-10-CM

## 2025-02-17 DIAGNOSIS — E53.8 VITAMIN B12 DEFICIENCY DUE TO INTESTINAL MALABSORPTION: Chronic | ICD-10-CM

## 2025-02-17 DIAGNOSIS — K90.9 VITAMIN B12 DEFICIENCY DUE TO INTESTINAL MALABSORPTION: Chronic | ICD-10-CM

## 2025-02-17 DIAGNOSIS — J30.9 ALLERGIC RHINITIS, UNSPECIFIED SEASONALITY, UNSPECIFIED TRIGGER: ICD-10-CM

## 2025-02-17 DIAGNOSIS — F41.1 GENERALIZED ANXIETY DISORDER: Chronic | ICD-10-CM

## 2025-02-17 DIAGNOSIS — K21.9 GASTROESOPHAGEAL REFLUX DISEASE WITHOUT ESOPHAGITIS: Chronic | ICD-10-CM

## 2025-02-17 PROCEDURE — 99214 OFFICE O/P EST MOD 30 MIN: CPT | Performed by: NURSE PRACTITIONER

## 2025-02-17 RX ORDER — DIPHENOXYLATE HYDROCHLORIDE AND ATROPINE SULFATE 2.5; .025 MG/1; MG/1
1 TABLET ORAL DAILY
COMMUNITY
Start: 2024-09-01 | End: 2025-02-17 | Stop reason: SDUPTHER

## 2025-02-17 NOTE — ASSESSMENT & PLAN NOTE
He reports occasional sinus drainage and a sensation of fullness in the ear, which comes and goes. There is no dizziness, lightheadedness, or hearing loss. The physical exam did not reveal any ruptured eardrum or significant findings. He is advised to continue taking Zyrtec and may add Flonase nasal spray, 2 sprays in each nostril, if symptoms persist.

## 2025-02-17 NOTE — PROGRESS NOTES
"Chief Complaint  Hyperlipidemia (6 month follow up)  Subjective        Bill Cano presents to [facility]    6 month follow up  Pertinent negative symptoms include no abdominal pain, no anorexia, no joint pain, no change in stool, no chest pain, no chills, no congestion, no cough, no diaphoresis, no fatigue, no fever, no headaches, no joint swelling, no myalgias, no nausea, no neck pain, no numbness, no rash, no sore throat, no swollen glands, no dysuria, no vertigo, no visual change, no vomiting and no weakness.     History of Present Illness  The patient presents for f/u regarding hypercholesteremia, HTN and anxiety.    He reports overall good health, with no recent illnesses. He has not been experiencing any issues related to acid reflux, which is well-managed with daily pantoprazole.     He reports no sinus drainage or recent headaches. He describes an intermittent sensation in his right ear, similar to a hole, but without associated pain. He does not experience dizziness or lightheadedness. He uses a mouthguard nightly. He reports no hearing impairment.     His bowel movements are regular, and he has not observed any blood in his stool. He reports no swelling or significant joint pain.     He has plantar fasciitis of the right foot, she notes increased pain with prolonged standing.    He reports satisfactory sleep quality, although he experiences frequent awakenings at night. He does not have any difficulty falling asleep.    Objective   Vital Signs:  /82 (BP Location: Left arm, Patient Position: Sitting, Cuff Size: Adult)   Pulse 79   Temp 98 °F (36.7 °C)   Ht 177.8 cm (70\")   Wt 103 kg (227 lb)   SpO2 98%   BMI 32.57 kg/m²   Estimated body mass index is 32.57 kg/m² as calculated from the following:    Height as of this encounter: 177.8 cm (70\").    Weight as of this encounter: 103 kg (227 lb).    BMI is >= 30 and <35. (Class 1 Obesity). The following options were " offered after discussion;: exercise counseling/recommendations and nutrition counseling/recommendations      Physical Exam   Physical Exam  Lungs were auscultated.    Vital Signs  Blood pressure is 122/82.    Result Review :  The following data was reviewed by: FELIPE Nj on 02/17/2025:  Common labs          8/5/2024    08:22 9/20/2024    08:09   Common Labs   Glucose 105  105    BUN 18  16    Creatinine 0.85  0.87    Sodium 139  139    Potassium 5.3  4.4    Chloride 102  103    Calcium 9.9  9.3    Albumin 4.5  4.4    Total Bilirubin 0.6  0.6    Alkaline Phosphatase 60  61    AST (SGOT) 21  25    ALT (SGPT) 26  30    WBC 5.92  5.52    Hemoglobin 14.9  14.9    Hematocrit 44.6  44.1    Platelets 222  217    Total Cholesterol 200     Triglycerides 197     HDL Cholesterol 62     LDL Cholesterol  104     PSA 0.678       Data reviewed : Consultant notes oncology 9/26/24      Results  Laboratory Studies  B12 levels were good. Kidney function, liver enzymes, and blood count were normal.             Assessment and Plan   Diagnoses and all orders for this visit:    1. Pure hypercholesterolemia (Primary)  Assessment & Plan:    with 8/2024 labs; continue ezetimibe-simvastatin along with a low-fat, low-cholesterol diet.      2. Vitamin B12 deficiency due to intestinal malabsorption  Assessment & Plan:  Vitamin B12 799 with 9/2024 labs, continue daily supplement.      3. Gastroesophageal reflux disease without esophagitis  Assessment & Plan:  Sx managed with daily pantoprazole, denies abdominal pain.      4. Generalized anxiety disorder  Assessment & Plan:  Symptoms managed with duloxetine daily, tolerating low-dose well and notes good control of symptoms.      5. Allergic rhinitis, unspecified seasonality, unspecified trigger  Assessment & Plan:  He reports occasional sinus drainage and a sensation of fullness in the ear, which comes and goes. There is no dizziness, lightheadedness, or hearing loss. The  physical exam did not reveal any ruptured eardrum or significant findings. He is advised to continue taking Zyrtec and may add Flonase nasal spray, 2 sprays in each nostril, if symptoms persist.        Assessment & Plan  Health maintenance.  His blood pressure readings were slightly elevated during the visit but normalized upon recheck at 122/82 mmHg.     His EGD and colonoscopy results from June 2020 were within normal limits, showing a normal esophagus and stomach. A biopsy indicated mild incidental colitis, likely from the preparation process, and no further workup was deemed necessary. He is due for a repeat EGD and colonoscopy 6/2025 with Dr. Vo.    CT scans of the abdomen and pelvis are recommended every 2 years until 2026 due to history of colon cancer.     A carotid ultrasound will be conducted as part of a vascular screening, which includes an aneurysm check, carotid screening, and blood flow assessment.            Follow Up   Return in about 6 months (around 8/17/2025) for Annual physical.  Patient was given instructions and counseling regarding his condition or for health maintenance advice. Please see specific information pulled into the AVS if appropriate.     Patient or patient representative verbalized consent for the use of Ambient Listening during the visit with  FELIPE Nj for chart documentation. 2/17/2025  09:12 EST

## 2025-03-03 DIAGNOSIS — F41.9 ANXIETY: ICD-10-CM

## 2025-03-03 DIAGNOSIS — E78.5 HYPERLIPIDEMIA, UNSPECIFIED HYPERLIPIDEMIA TYPE: ICD-10-CM

## 2025-03-03 RX ORDER — EZETIMIBE AND SIMVASTATIN 10; 40 MG/1; MG/1
TABLET ORAL
Qty: 90 TABLET | Refills: 1 | Status: SHIPPED | OUTPATIENT
Start: 2025-03-03

## 2025-03-03 RX ORDER — DULOXETIN HYDROCHLORIDE 30 MG/1
CAPSULE, DELAYED RELEASE ORAL
Qty: 90 CAPSULE | Refills: 1 | Status: SHIPPED | OUTPATIENT
Start: 2025-03-03

## 2025-03-03 NOTE — TELEPHONE ENCOUNTER
Rx Refill Note  Requested Prescriptions     Pending Prescriptions Disp Refills    ezetimibe-simvastatin (VYTORIN) 10-40 MG per tablet [Pharmacy Med Name: EZETIM/SIMVA TAB 10-40MG] 90 tablet 1     Sig: TAKE 1 TABLET DAILY.    DULoxetine (CYMBALTA) 30 MG capsule [Pharmacy Med Name: DULOXETINE CAP 30MG DR] 90 capsule 1     Sig: TAKE 1 CAPSULE DAILY      Last office visit with prescribing clinician: 2/17/2025   Last telemedicine visit with prescribing clinician: Visit date not found   Next office visit with prescribing clinician: 8/25/2025                         Would you like a call back once the refill request has been completed: [] Yes [] No    If the office needs to give you a call back, can they leave a voicemail: [] Yes [] No    Calista Vaughan  03/03/25, 16:18 EST

## 2025-03-05 ENCOUNTER — TELEPHONE (OUTPATIENT)
Dept: GASTROENTEROLOGY | Facility: CLINIC | Age: 55
End: 2025-03-05
Payer: COMMERCIAL

## 2025-03-05 NOTE — TELEPHONE ENCOUNTER
Last colonoscopy 6/17/20    Personal hx polyps    Family hx polyps    No family hx of cx    ASA or blood thinners:  Ibuprofen    List medications:  Pantoprazole  Atenolol  Duloxetine  Vytorin  B12  Cetrizine  Mens 50+ vitamin    OA form scanned in media

## 2025-03-06 ENCOUNTER — PREP FOR SURGERY (OUTPATIENT)
Dept: OTHER | Facility: HOSPITAL | Age: 55
End: 2025-03-06
Payer: COMMERCIAL

## 2025-03-06 DIAGNOSIS — Z12.11 ENCOUNTER FOR SCREENING FOR MALIGNANT NEOPLASM OF COLON: Primary | ICD-10-CM

## 2025-03-11 ENCOUNTER — TELEPHONE (OUTPATIENT)
Dept: GASTROENTEROLOGY | Facility: CLINIC | Age: 55
End: 2025-03-11

## 2025-03-11 ENCOUNTER — TELEPHONE (OUTPATIENT)
Dept: GASTROENTEROLOGY | Facility: CLINIC | Age: 55
End: 2025-03-11
Payer: COMMERCIAL

## 2025-03-11 NOTE — TELEPHONE ENCOUNTER
Pt is returning Dionisio's call to schedule.  He would like a call back.  Preferable early in the morning

## 2025-03-12 ENCOUNTER — TELEPHONE (OUTPATIENT)
Dept: GASTROENTEROLOGY | Facility: CLINIC | Age: 55
End: 2025-03-12
Payer: COMMERCIAL

## 2025-05-29 NOTE — SIGNIFICANT NOTE
Education provided the Patient on the following:    - Nothing to Eat or Drink after MN the night before the procedure  - Avoid red/purple fluids while completing their bowel prep as ordered by physician  - Contact Gastrointerologist office for any questions about specific details regarding colon prep  - You will need to have someone drive you home after your colonoscopy and remain with you for 24 hours after the procedure  - The date of your Surgery, you may have one visitor at bedside or within 10-15 minutes of [facility]  - Please wear warm socks when you arrive for your colonoscopy  - Remove all jewelry and leave any valuables before arriving the day of your procedure (all will have to be removed before leaving preop)  - You will need to arrive at 0730 on 5-30-25 for your colonoscopy at [facility] located at [address], [address].  - Feel free to contact us at: [phone] with any additional questions/concerns

## 2025-05-30 ENCOUNTER — ANESTHESIA EVENT (OUTPATIENT)
Dept: SURGERY | Facility: SURGERY CENTER | Age: 55
End: 2025-05-30
Payer: COMMERCIAL

## 2025-05-30 ENCOUNTER — HOSPITAL ENCOUNTER (OUTPATIENT)
Facility: SURGERY CENTER | Age: 55
Setting detail: HOSPITAL OUTPATIENT SURGERY
Discharge: HOME | End: 2025-05-30
Attending: INTERNAL MEDICINE | Admitting: INTERNAL MEDICINE
Payer: COMMERCIAL

## 2025-05-30 ENCOUNTER — ANESTHESIA (OUTPATIENT)
Dept: SURGERY | Facility: SURGERY CENTER | Age: 55
End: 2025-05-30
Payer: COMMERCIAL

## 2025-05-30 VITALS
SYSTOLIC BLOOD PRESSURE: 122 MMHG | RESPIRATION RATE: 17 BRPM | WEIGHT: 220 LBS | OXYGEN SATURATION: 94 % | TEMPERATURE: 98.4 F | BODY MASS INDEX: 31.5 KG/M2 | HEIGHT: 70 IN | DIASTOLIC BLOOD PRESSURE: 76 MMHG | HEART RATE: 79 BPM

## 2025-05-30 DIAGNOSIS — Z12.11 ENCOUNTER FOR SCREENING FOR MALIGNANT NEOPLASM OF COLON: ICD-10-CM

## 2025-05-30 PROCEDURE — 25010000002 LIDOCAINE PF 2% 2 % SOLUTION: Performed by: NURSE ANESTHETIST, CERTIFIED REGISTERED

## 2025-05-30 PROCEDURE — 45385 COLONOSCOPY W/LESION REMOVAL: CPT | Performed by: INTERNAL MEDICINE

## 2025-05-30 PROCEDURE — S0260 H&P FOR SURGERY: HCPCS | Performed by: INTERNAL MEDICINE

## 2025-05-30 PROCEDURE — 88305 TISSUE EXAM BY PATHOLOGIST: CPT | Performed by: INTERNAL MEDICINE

## 2025-05-30 PROCEDURE — 25810000003 LACTATED RINGERS PER 1000 ML: Performed by: INTERNAL MEDICINE

## 2025-05-30 PROCEDURE — 25010000002 PROPOFOL 10 MG/ML EMULSION: Performed by: NURSE ANESTHETIST, CERTIFIED REGISTERED

## 2025-05-30 RX ORDER — SODIUM CHLORIDE 0.9 % (FLUSH) 0.9 %
10 SYRINGE (ML) INJECTION AS NEEDED
Status: DISCONTINUED | OUTPATIENT
Start: 2025-05-30 | End: 2025-05-30 | Stop reason: HOSPADM

## 2025-05-30 RX ORDER — SODIUM CHLORIDE, SODIUM LACTATE, POTASSIUM CHLORIDE, CALCIUM CHLORIDE 600; 310; 30; 20 MG/100ML; MG/100ML; MG/100ML; MG/100ML
50 INJECTION, SOLUTION INTRAVENOUS CONTINUOUS
Status: DISCONTINUED | OUTPATIENT
Start: 2025-05-30 | End: 2025-05-30 | Stop reason: HOSPADM

## 2025-05-30 RX ORDER — LIDOCAINE HYDROCHLORIDE 10 MG/ML
0.5 INJECTION, SOLUTION INFILTRATION; PERINEURAL ONCE AS NEEDED
Status: DISCONTINUED | OUTPATIENT
Start: 2025-05-30 | End: 2025-05-30 | Stop reason: HOSPADM

## 2025-05-30 RX ORDER — CETIRIZINE HYDROCHLORIDE 10 MG/1
10 TABLET ORAL DAILY
COMMUNITY

## 2025-05-30 RX ORDER — PROPOFOL 10 MG/ML
VIAL (ML) INTRAVENOUS AS NEEDED
Status: DISCONTINUED | OUTPATIENT
Start: 2025-05-30 | End: 2025-05-30 | Stop reason: SURG

## 2025-05-30 RX ORDER — LIDOCAINE HYDROCHLORIDE 20 MG/ML
INJECTION, SOLUTION EPIDURAL; INFILTRATION; INTRACAUDAL; PERINEURAL AS NEEDED
Status: DISCONTINUED | OUTPATIENT
Start: 2025-05-30 | End: 2025-05-30 | Stop reason: SURG

## 2025-05-30 RX ADMIN — PROPOFOL 50 MG: 10 INJECTION, EMULSION INTRAVENOUS at 09:17

## 2025-05-30 RX ADMIN — PROPOFOL 200 MCG/KG/MIN: 10 INJECTION, EMULSION INTRAVENOUS at 09:15

## 2025-05-30 RX ADMIN — PROPOFOL 100 MG: 10 INJECTION, EMULSION INTRAVENOUS at 09:15

## 2025-05-30 RX ADMIN — SODIUM CHLORIDE, POTASSIUM CHLORIDE, SODIUM LACTATE AND CALCIUM CHLORIDE 50 ML/HR: 600; 310; 30; 20 INJECTION, SOLUTION INTRAVENOUS at 08:07

## 2025-05-30 RX ADMIN — LIDOCAINE HYDROCHLORIDE 60 MG: 20 INJECTION, SOLUTION EPIDURAL; INFILTRATION; INTRACAUDAL; PERINEURAL at 09:15

## 2025-05-30 NOTE — H&P
[facility]  Pre Procedure History & Physical    Chief Complaint:   Time for my colonoscopy    Subjective     HPI:   55 y.o. male presenting to endoscopy unit today for surveillance colonoscopy.    Past Medical History:   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/2016    Colon polyp     Diarrhea     Diverticulitis of colon     Diverticulosis     Dizziness     GERD (gastroesophageal reflux disease)     Headache     Heart palpitations     Patient takes Atenolol    History of kidney stones     Hypercholesterolemia     Hypertension     Low back pain     Nephrolithiasis        Family History:  Family History   Problem Relation Age of Onset    Memory loss Mother     Asthma Mother     Heart disease Father     Colon polyps Father     Atrial fibrillation Brother     Malig Hyperthermia Neg Hx        Social History:   reports that he has never smoked. He has never used smokeless tobacco. He reports current alcohol use of about 4.0 standard drinks of alcohol per week. He reports that he does not use drugs.    Medications:   Medications Prior to Admission   Medication Sig Dispense Refill Last Dose/Taking    atenolol (TENORMIN) 25 MG tablet TAKE 1/2 TABLET EVERY      EVENING 45 tablet 1 5/30/2025 Morning    DULoxetine (CYMBALTA) 30 MG capsule TAKE 1 CAPSULE DAILY 90 capsule 1 5/29/2025    ezetimibe-simvastatin (VYTORIN) 10-40 MG per tablet TAKE 1 TABLET DAILY. 90 tablet 1 5/29/2025    pantoprazole (PROTONIX) 40 MG EC tablet TAKE 1 TABLET DAILY 90 tablet 1 5/29/2025    vitamin B-12 (CYANOCOBALAMIN) 1000 MCG tablet Take 1 tablet by mouth Daily. 90 tablet 3 5/29/2025    calcium carbonate EX (TUMS EX) 750 MG chewable tablet Chew 1 tablet As Needed for Indigestion or Heartburn.   5/21/2025    cetirizine (zyrTEC) 10 MG tablet Take 1 tablet by mouth Daily.   5/28/2025    multivitamin (MULTI VITAMIN MENS PO)  "Take  by mouth Daily. 50+ men's vitamin   5/28/2025       Allergies:  Penicillins    Objective     Blood pressure 136/97, pulse 81, temperature 98.1 °F (36.7 °C), temperature source Temporal, resp. rate 16, height 177.8 cm (70\"), weight 99.8 kg (220 lb), SpO2 97%.  Physical Exam:   General: patient awake, alert and cooperative    Assessment & Plan     Diagnosis:  Personal hx of colon polyps    Anticipated Surgical Procedure:  Colonoscopy    The risks, benefits, and alternatives of this procedure have been discussed with the patient or the responsible party- the patient understands and agrees to proceed.                                                                 "

## 2025-05-30 NOTE — ANESTHESIA PREPROCEDURE EVALUATION
Anesthesia Evaluation     Patient summary reviewed and Nursing notes reviewed   no history of anesthetic complications:   NPO Solid Status: > 8 hours  NPO Liquid Status: > 2 hours           Airway   Mallampati: II  TM distance: >3 FB  Neck ROM: full  No difficulty expected  Dental - normal exam     Pulmonary - normal exam   Cardiovascular     ECG reviewed  Rhythm: regular  Rate: normal    (+) hypertension, dysrhythmias (palpitations), hyperlipidemia    ROS comment: Normal stress echo 2019    Neuro/Psych  (+) headaches, dizziness/light headedness, psychiatric history Anxiety  GI/Hepatic/Renal/Endo    (+) obesity, GERD, renal disease-    Musculoskeletal     Abdominal    Substance History      OB/GYN          Other      history of cancer                      Anesthesia Plan    ASA 2     MAC   total IV anesthesia  intravenous induction     Anesthetic plan, risks, benefits, and alternatives have been provided, discussed and informed consent has been obtained with: patient.  Pre-procedure education provided  Plan discussed with CRNA.        CODE STATUS:

## 2025-05-30 NOTE — ANESTHESIA POSTPROCEDURE EVALUATION
"Patient: Bill Cano    Procedure Summary       Date: 05/30/25 Room / Location: [facility] OR [facility] / [facility]    Anesthesia Start: 0913 Anesthesia Stop: 0933    Procedure: COLONOSCOPY TO NEOTERMINAL ILEUM, POLYPECTOMY Diagnosis:       Encounter for screening for malignant neoplasm of colon      (Encounter for screening for malignant neoplasm of colon [Z12.11])    Surgeons: Will Vo MD Provider: Barbra Moreno MD    Anesthesia Type: MAC ASA Status: 2            Anesthesia Type: MAC    Vitals  Vitals Value Taken Time   /76 05/30/25 09:54   Temp 36.9 °C (98.4 °F) 05/30/25 09:34   Pulse 75 05/30/25 09:52   Resp 17 05/30/25 09:47   SpO2 95 % 05/30/25 09:52   Vitals shown include unfiled device data.        Post Anesthesia Care and Evaluation    Level of consciousness: awake  Pain management: adequate  Anesthetic complications: No anesthetic complications    Cardiovascular status: acceptable  Respiratory status: acceptable    Comments: /76   Pulse 79   Temp 36.9 °C (98.4 °F)   Resp 17   Ht 177.8 cm (70\")   Wt 99.8 kg (220 lb)   SpO2 94%   BMI 31.57 kg/m²     "

## 2025-06-02 LAB
CYTO UR: NORMAL
LAB AP CASE REPORT: NORMAL
LAB AP CLINICAL INFORMATION: NORMAL
PATH REPORT.FINAL DX SPEC: NORMAL
PATH REPORT.GROSS SPEC: NORMAL

## 2025-06-03 ENCOUNTER — RESULTS FOLLOW-UP (OUTPATIENT)
Dept: SURGERY | Facility: SURGERY CENTER | Age: 55
End: 2025-06-03
Payer: COMMERCIAL

## 2025-06-23 DIAGNOSIS — R00.2 PALPITATIONS: ICD-10-CM

## 2025-06-23 DIAGNOSIS — R10.13 EPIGASTRIC PAIN: ICD-10-CM

## 2025-06-24 RX ORDER — PANTOPRAZOLE SODIUM 40 MG/1
40 TABLET, DELAYED RELEASE ORAL DAILY
Qty: 90 TABLET | Refills: 1 | Status: SHIPPED | OUTPATIENT
Start: 2025-06-24

## 2025-06-24 RX ORDER — ATENOLOL 25 MG/1
TABLET ORAL
Qty: 45 TABLET | Refills: 1 | Status: SHIPPED | OUTPATIENT
Start: 2025-06-24

## 2025-08-25 ENCOUNTER — OFFICE VISIT (OUTPATIENT)
Dept: INTERNAL MEDICINE | Facility: CLINIC | Age: 55
End: 2025-08-25
Payer: COMMERCIAL

## 2025-08-25 VITALS
HEART RATE: 92 BPM | BODY MASS INDEX: 32.35 KG/M2 | SYSTOLIC BLOOD PRESSURE: 130 MMHG | DIASTOLIC BLOOD PRESSURE: 84 MMHG | WEIGHT: 226 LBS | OXYGEN SATURATION: 99 % | HEIGHT: 70 IN

## 2025-08-25 DIAGNOSIS — I10 ESSENTIAL HYPERTENSION: ICD-10-CM

## 2025-08-25 DIAGNOSIS — G43.E09 CHRONIC MIGRAINE WITH AURA WITHOUT STATUS MIGRAINOSUS, NOT INTRACTABLE: ICD-10-CM

## 2025-08-25 DIAGNOSIS — K21.9 GASTROESOPHAGEAL REFLUX DISEASE WITHOUT ESOPHAGITIS: Chronic | ICD-10-CM

## 2025-08-25 DIAGNOSIS — K90.9 VITAMIN B12 DEFICIENCY DUE TO INTESTINAL MALABSORPTION: Chronic | ICD-10-CM

## 2025-08-25 DIAGNOSIS — Z00.00 PHYSICAL EXAM: Primary | ICD-10-CM

## 2025-08-25 DIAGNOSIS — E78.00 PURE HYPERCHOLESTEROLEMIA: Chronic | ICD-10-CM

## 2025-08-25 DIAGNOSIS — Z12.5 SCREENING FOR PROSTATE CANCER: ICD-10-CM

## 2025-08-25 DIAGNOSIS — E53.8 VITAMIN B12 DEFICIENCY DUE TO INTESTINAL MALABSORPTION: Chronic | ICD-10-CM

## 2025-08-25 PROBLEM — Z12.11 ENCOUNTER FOR SCREENING FOR MALIGNANT NEOPLASM OF COLON: Status: RESOLVED | Noted: 2025-03-06 | Resolved: 2025-08-25

## 2025-08-25 PROBLEM — Z83.719 FAMILY HISTORY OF POLYPS IN THE COLON: Status: RESOLVED | Noted: 2017-08-10 | Resolved: 2025-08-25

## 2025-08-25 LAB
ALBUMIN SERPL-MCNC: 4.6 G/DL (ref 3.5–5.2)
ALBUMIN/GLOB SERPL: 2.1 G/DL
ALP SERPL-CCNC: 63 U/L (ref 39–117)
ALT SERPL-CCNC: 34 U/L (ref 1–41)
AST SERPL-CCNC: 24 U/L (ref 1–40)
BILIRUB SERPL-MCNC: 0.6 MG/DL (ref 0–1.2)
BUN SERPL-MCNC: 24 MG/DL (ref 6–20)
BUN/CREAT SERPL: 19.4 (ref 7–25)
CALCIUM SERPL-MCNC: 10 MG/DL (ref 8.6–10.5)
CHLORIDE SERPL-SCNC: 103 MMOL/L (ref 98–107)
CHOLEST SERPL-MCNC: 178 MG/DL (ref 0–200)
CO2 SERPL-SCNC: 26.5 MMOL/L (ref 22–29)
CREAT SERPL-MCNC: 1.24 MG/DL (ref 0.76–1.27)
EGFRCR SERPLBLD CKD-EPI 2021: 68.2 ML/MIN/1.73
ERYTHROCYTE [DISTWIDTH] IN BLOOD BY AUTOMATED COUNT: 12.7 % (ref 12.3–15.4)
GLOBULIN SER CALC-MCNC: 2.2 GM/DL
GLUCOSE SERPL-MCNC: 108 MG/DL (ref 65–99)
HCT VFR BLD AUTO: 44.5 % (ref 37.5–51)
HDLC SERPL-MCNC: 59 MG/DL (ref 40–60)
HGB BLD-MCNC: 15.4 G/DL (ref 13–17.7)
LDLC SERPL CALC-MCNC: 83 MG/DL (ref 0–100)
MCH RBC QN AUTO: 31.1 PG (ref 26.6–33)
MCHC RBC AUTO-ENTMCNC: 34.6 G/DL (ref 31.5–35.7)
MCV RBC AUTO: 89.9 FL (ref 79–97)
PLATELET # BLD AUTO: 221 10*3/MM3 (ref 140–450)
POTASSIUM SERPL-SCNC: 5.7 MMOL/L (ref 3.5–5.2)
PROT SERPL-MCNC: 6.8 G/DL (ref 6–8.5)
PSA SERPL-MCNC: 0.74 NG/ML (ref 0–4)
RBC # BLD AUTO: 4.95 10*6/MM3 (ref 4.14–5.8)
SODIUM SERPL-SCNC: 140 MMOL/L (ref 136–145)
TRIGL SERPL-MCNC: 219 MG/DL (ref 0–150)
TSH SERPL DL<=0.005 MIU/L-ACNC: 2.82 UIU/ML (ref 0.27–4.2)
VLDLC SERPL CALC-MCNC: 36 MG/DL (ref 5–40)
WBC # BLD AUTO: 5.89 10*3/MM3 (ref 3.4–10.8)

## 2025-08-25 PROCEDURE — 99396 PREV VISIT EST AGE 40-64: CPT | Performed by: NURSE PRACTITIONER

## (undated) DEVICE — GOWN ISOL W/THUMB UNIV BLU BX/15

## (undated) DEVICE — GOWN,SIRUS,NONRNF,3XL,18/CS: Brand: MEDLINE

## (undated) DEVICE — ADAPT CLN SCPE ENDO PORPOISE BX/50 DISP

## (undated) DEVICE — Device: Brand: DEFENDO AIR/WATER/SUCTION AND BIOPSY VALVE

## (undated) DEVICE — TBG 02 CRUSH RESIST LF CLR 7FT

## (undated) DEVICE — KT ORCA ORCAPOD DISP STRL

## (undated) DEVICE — THE TORRENT IRRIGATION SCOPE CONNECTOR IS USED WITH THE TORRENT IRRIGATION TUBING TO PROVIDE IRRIGATION FLUIDS SUCH AS STERILE WATER DURING GASTROINTESTINAL ENDOSCOPIC PROCEDURES WHEN USED IN CONJUNCTION WITH AN IRRIGATION PUMP (OR ELECTROSURGICAL UNIT).: Brand: TORRENT

## (undated) DEVICE — SNAR POLYP CAPTIVATOR RND STFF 2.4 240CM 10MM 1P/U

## (undated) DEVICE — FLEX ADVANTAGE 1500CC: Brand: FLEX ADVANTAGE

## (undated) DEVICE — THE SINGLE USE ETRAP – POLYP TRAP IS USED FOR SUCTION RETRIEVAL OF ENDOSCOPICALLY REMOVED POLYPS.: Brand: ETRAP

## (undated) DEVICE — VIAL FORMLN CAP 10PCT 20ML

## (undated) DEVICE — TUBING, SUCTION, 1/4" X 10', STRAIGHT: Brand: MEDLINE

## (undated) DEVICE — CANN NASL CO2 TRULINK W/O2 A/

## (undated) DEVICE — CANN O2 ETCO2 FITS ALL CONN CO2 SMPL A/ 7IN DISP LF

## (undated) DEVICE — Device